# Patient Record
Sex: MALE | Race: WHITE | Employment: OTHER | URBAN - METROPOLITAN AREA
[De-identification: names, ages, dates, MRNs, and addresses within clinical notes are randomized per-mention and may not be internally consistent; named-entity substitution may affect disease eponyms.]

---

## 2019-04-30 ENCOUNTER — APPOINTMENT (OUTPATIENT)
Dept: CARDIOLOGY | Facility: CLINIC | Age: 54
End: 2019-04-30
Attending: EMERGENCY MEDICINE
Payer: COMMERCIAL

## 2019-04-30 ENCOUNTER — HOSPITAL ENCOUNTER (INPATIENT)
Facility: CLINIC | Age: 54
LOS: 3 days | Discharge: HOME | End: 2019-05-03
Attending: EMERGENCY MEDICINE | Admitting: STUDENT IN AN ORGANIZED HEALTH CARE EDUCATION/TRAINING PROGRAM
Payer: COMMERCIAL

## 2019-04-30 ENCOUNTER — HOSPITAL ENCOUNTER (OUTPATIENT)
Facility: CLINIC | Age: 54
End: 2019-04-30

## 2019-04-30 DIAGNOSIS — I20.0 UNSTABLE ANGINA PECTORIS (H): ICD-10-CM

## 2019-04-30 DIAGNOSIS — E66.01 MORBID OBESITY (H): ICD-10-CM

## 2019-04-30 DIAGNOSIS — I21.4 NSTEMI (NON-ST ELEVATED MYOCARDIAL INFARCTION) (H): ICD-10-CM

## 2019-04-30 DIAGNOSIS — I10 ESSENTIAL HYPERTENSION: Primary | ICD-10-CM

## 2019-04-30 DIAGNOSIS — I25.110 CORONARY ARTERY DISEASE INVOLVING NATIVE CORONARY ARTERY OF NATIVE HEART WITH UNSTABLE ANGINA PECTORIS (H): ICD-10-CM

## 2019-04-30 DIAGNOSIS — I24.9 ACS (ACUTE CORONARY SYNDROME) (H): ICD-10-CM

## 2019-04-30 DIAGNOSIS — I25.5 ISCHEMIC CARDIOMYOPATHY: ICD-10-CM

## 2019-04-30 DIAGNOSIS — I89.0 LYMPHEDEMA: ICD-10-CM

## 2019-04-30 LAB
ANION GAP SERPL CALCULATED.3IONS-SCNC: 8 MMOL/L (ref 3–14)
BASOPHILS # BLD AUTO: 0 10E9/L (ref 0–0.2)
BASOPHILS NFR BLD AUTO: 0.3 %
BUN SERPL-MCNC: 14 MG/DL (ref 7–30)
CALCIUM SERPL-MCNC: 9.2 MG/DL (ref 8.5–10.1)
CHLORIDE SERPL-SCNC: 105 MMOL/L (ref 94–109)
CO2 SERPL-SCNC: 27 MMOL/L (ref 20–32)
CREAT SERPL-MCNC: 0.76 MG/DL (ref 0.66–1.25)
D DIMER PPP FEU-MCNC: 0.8 UG/ML FEU (ref 0–0.5)
DIFFERENTIAL METHOD BLD: ABNORMAL
EOSINOPHIL # BLD AUTO: 0.1 10E9/L (ref 0–0.7)
EOSINOPHIL NFR BLD AUTO: 0.6 %
ERYTHROCYTE [DISTWIDTH] IN BLOOD BY AUTOMATED COUNT: 13.6 % (ref 10–15)
GFR SERPL CREATININE-BSD FRML MDRD: >90 ML/MIN/{1.73_M2}
GLUCOSE SERPL-MCNC: 112 MG/DL (ref 70–99)
HCT VFR BLD AUTO: 39.9 % (ref 40–53)
HGB BLD-MCNC: 13 G/DL (ref 13.3–17.7)
IMM GRANULOCYTES # BLD: 0 10E9/L (ref 0–0.4)
IMM GRANULOCYTES NFR BLD: 0.2 %
INTERPRETATION ECG - MUSE: NORMAL
INTERPRETATION ECG - MUSE: NORMAL
LYMPHOCYTES # BLD AUTO: 1.7 10E9/L (ref 0.8–5.3)
LYMPHOCYTES NFR BLD AUTO: 19 %
MCH RBC QN AUTO: 29.7 PG (ref 26.5–33)
MCHC RBC AUTO-ENTMCNC: 32.6 G/DL (ref 31.5–36.5)
MCV RBC AUTO: 91 FL (ref 78–100)
MONOCYTES # BLD AUTO: 1 10E9/L (ref 0–1.3)
MONOCYTES NFR BLD AUTO: 10.7 %
NEUTROPHILS # BLD AUTO: 6.1 10E9/L (ref 1.6–8.3)
NEUTROPHILS NFR BLD AUTO: 69.2 %
NRBC # BLD AUTO: 0 10*3/UL
NRBC BLD AUTO-RTO: 0 /100
NT-PROBNP SERPL-MCNC: 2872 PG/ML (ref 0–900)
PLATELET # BLD AUTO: 199 10E9/L (ref 150–450)
POTASSIUM SERPL-SCNC: 3.6 MMOL/L (ref 3.4–5.3)
RBC # BLD AUTO: 4.38 10E12/L (ref 4.4–5.9)
SODIUM SERPL-SCNC: 140 MMOL/L (ref 133–144)
TROPONIN I SERPL-MCNC: 16.56 UG/L (ref 0–0.04)
TSH SERPL DL<=0.005 MIU/L-ACNC: 1.05 MU/L (ref 0.4–4)
WBC # BLD AUTO: 8.8 10E9/L (ref 4–11)

## 2019-04-30 PROCEDURE — 25000128 H RX IP 250 OP 636: Performed by: INTERNAL MEDICINE

## 2019-04-30 PROCEDURE — 27210794 ZZH OR GENERAL SUPPLY STERILE: Performed by: INTERNAL MEDICINE

## 2019-04-30 PROCEDURE — 93005 ELECTROCARDIOGRAM TRACING: CPT | Mod: 76

## 2019-04-30 PROCEDURE — 93454 CORONARY ARTERY ANGIO S&I: CPT | Performed by: INTERNAL MEDICINE

## 2019-04-30 PROCEDURE — 93010 ELECTROCARDIOGRAM REPORT: CPT | Performed by: INTERNAL MEDICINE

## 2019-04-30 PROCEDURE — 93458 L HRT ARTERY/VENTRICLE ANGIO: CPT | Performed by: INTERNAL MEDICINE

## 2019-04-30 PROCEDURE — 85379 FIBRIN DEGRADATION QUANT: CPT | Performed by: EMERGENCY MEDICINE

## 2019-04-30 PROCEDURE — 25500064 ZZH RX 255 OP 636: Performed by: EMERGENCY MEDICINE

## 2019-04-30 PROCEDURE — 4A023N7 MEASUREMENT OF CARDIAC SAMPLING AND PRESSURE, LEFT HEART, PERCUTANEOUS APPROACH: ICD-10-PCS | Performed by: INTERNAL MEDICINE

## 2019-04-30 PROCEDURE — 93325 DOPPLER ECHO COLOR FLOW MAPG: CPT | Mod: 26 | Performed by: INTERNAL MEDICINE

## 2019-04-30 PROCEDURE — 99152 MOD SED SAME PHYS/QHP 5/>YRS: CPT | Performed by: INTERNAL MEDICINE

## 2019-04-30 PROCEDURE — 93321 DOPPLER ECHO F-UP/LMTD STD: CPT | Mod: 26 | Performed by: INTERNAL MEDICINE

## 2019-04-30 PROCEDURE — 99223 1ST HOSP IP/OBS HIGH 75: CPT | Mod: AI | Performed by: STUDENT IN AN ORGANIZED HEALTH CARE EDUCATION/TRAINING PROGRAM

## 2019-04-30 PROCEDURE — 84443 ASSAY THYROID STIM HORMONE: CPT | Performed by: EMERGENCY MEDICINE

## 2019-04-30 PROCEDURE — 85025 COMPLETE CBC W/AUTO DIFF WBC: CPT | Performed by: EMERGENCY MEDICINE

## 2019-04-30 PROCEDURE — 96375 TX/PRO/DX INJ NEW DRUG ADDON: CPT

## 2019-04-30 PROCEDURE — C1769 GUIDE WIRE: HCPCS | Performed by: INTERNAL MEDICINE

## 2019-04-30 PROCEDURE — 027034Z DILATION OF CORONARY ARTERY, ONE ARTERY WITH DRUG-ELUTING INTRALUMINAL DEVICE, PERCUTANEOUS APPROACH: ICD-10-PCS | Performed by: INTERNAL MEDICINE

## 2019-04-30 PROCEDURE — 96365 THER/PROPH/DIAG IV INF INIT: CPT

## 2019-04-30 PROCEDURE — 84484 ASSAY OF TROPONIN QUANT: CPT | Performed by: EMERGENCY MEDICINE

## 2019-04-30 PROCEDURE — C1874 STENT, COATED/COV W/DEL SYS: HCPCS | Performed by: INTERNAL MEDICINE

## 2019-04-30 PROCEDURE — 40000264 ECHOCARDIOGRAM LIMITED

## 2019-04-30 PROCEDURE — B2111ZZ FLUOROSCOPY OF MULTIPLE CORONARY ARTERIES USING LOW OSMOLAR CONTRAST: ICD-10-PCS | Performed by: INTERNAL MEDICINE

## 2019-04-30 PROCEDURE — 25000128 H RX IP 250 OP 636: Performed by: EMERGENCY MEDICINE

## 2019-04-30 PROCEDURE — C1725 CATH, TRANSLUMIN NON-LASER: HCPCS | Performed by: INTERNAL MEDICINE

## 2019-04-30 PROCEDURE — 83880 ASSAY OF NATRIURETIC PEPTIDE: CPT | Performed by: EMERGENCY MEDICINE

## 2019-04-30 PROCEDURE — 25000125 ZZHC RX 250: Performed by: EMERGENCY MEDICINE

## 2019-04-30 PROCEDURE — 99291 CRITICAL CARE FIRST HOUR: CPT | Mod: 25 | Performed by: INTERNAL MEDICINE

## 2019-04-30 PROCEDURE — 93005 ELECTROCARDIOGRAM TRACING: CPT

## 2019-04-30 PROCEDURE — 93308 TTE F-UP OR LMTD: CPT | Mod: 26 | Performed by: INTERNAL MEDICINE

## 2019-04-30 PROCEDURE — 25000132 ZZH RX MED GY IP 250 OP 250 PS 637: Performed by: INTERNAL MEDICINE

## 2019-04-30 PROCEDURE — 25000132 ZZH RX MED GY IP 250 OP 250 PS 637: Performed by: EMERGENCY MEDICINE

## 2019-04-30 PROCEDURE — C1887 CATHETER, GUIDING: HCPCS | Performed by: INTERNAL MEDICINE

## 2019-04-30 PROCEDURE — 96366 THER/PROPH/DIAG IV INF ADDON: CPT

## 2019-04-30 PROCEDURE — 99285 EMERGENCY DEPT VISIT HI MDM: CPT

## 2019-04-30 PROCEDURE — 25000125 ZZHC RX 250: Performed by: INTERNAL MEDICINE

## 2019-04-30 PROCEDURE — 99153 MOD SED SAME PHYS/QHP EA: CPT | Performed by: INTERNAL MEDICINE

## 2019-04-30 PROCEDURE — 96376 TX/PRO/DX INJ SAME DRUG ADON: CPT

## 2019-04-30 PROCEDURE — 21000001 ZZH R&B HEART CARE

## 2019-04-30 PROCEDURE — 85347 COAGULATION TIME ACTIVATED: CPT

## 2019-04-30 PROCEDURE — C9606 PERC D-E COR REVASC W AMI S: HCPCS | Performed by: INTERNAL MEDICINE

## 2019-04-30 PROCEDURE — 25000128 H RX IP 250 OP 636

## 2019-04-30 PROCEDURE — 80048 BASIC METABOLIC PNL TOTAL CA: CPT | Performed by: EMERGENCY MEDICINE

## 2019-04-30 PROCEDURE — C1894 INTRO/SHEATH, NON-LASER: HCPCS | Performed by: INTERNAL MEDICINE

## 2019-04-30 DEVICE — STENT SYNERGY DRUG ELUTING 3.50X20MM  H7493926020350: Type: IMPLANTABLE DEVICE | Status: FUNCTIONAL

## 2019-04-30 RX ORDER — HEPARIN SODIUM 1000 [USP'U]/ML
INJECTION, SOLUTION INTRAVENOUS; SUBCUTANEOUS
Status: DISCONTINUED | OUTPATIENT
Start: 2019-04-30 | End: 2019-04-30 | Stop reason: HOSPADM

## 2019-04-30 RX ORDER — IBUPROFEN 200 MG
800 TABLET ORAL DAILY
Status: ON HOLD | COMMUNITY
End: 2019-05-03

## 2019-04-30 RX ORDER — LORAZEPAM 0.5 MG/1
0.5 TABLET ORAL
Status: DISCONTINUED | OUTPATIENT
Start: 2019-04-30 | End: 2019-04-30 | Stop reason: HOSPADM

## 2019-04-30 RX ORDER — VERAPAMIL HYDROCHLORIDE 2.5 MG/ML
INJECTION, SOLUTION INTRAVENOUS
Status: DISCONTINUED | OUTPATIENT
Start: 2019-04-30 | End: 2019-04-30 | Stop reason: HOSPADM

## 2019-04-30 RX ORDER — FENTANYL CITRATE 50 UG/ML
25-50 INJECTION, SOLUTION INTRAMUSCULAR; INTRAVENOUS
Status: ACTIVE | OUTPATIENT
Start: 2019-04-30 | End: 2019-05-01

## 2019-04-30 RX ORDER — NITROGLYCERIN 0.4 MG/1
0.4 TABLET SUBLINGUAL EVERY 5 MIN PRN
Status: DISCONTINUED | OUTPATIENT
Start: 2019-04-30 | End: 2019-05-01

## 2019-04-30 RX ORDER — NALOXONE HYDROCHLORIDE 0.4 MG/ML
.1-.4 INJECTION, SOLUTION INTRAMUSCULAR; INTRAVENOUS; SUBCUTANEOUS
Status: DISCONTINUED | OUTPATIENT
Start: 2019-04-30 | End: 2019-04-30

## 2019-04-30 RX ORDER — HYDROCODONE BITARTRATE AND ACETAMINOPHEN 5; 325 MG/1; MG/1
1-2 TABLET ORAL EVERY 4 HOURS PRN
Status: DISCONTINUED | OUTPATIENT
Start: 2019-04-30 | End: 2019-05-03 | Stop reason: HOSPADM

## 2019-04-30 RX ORDER — METOPROLOL TARTRATE 25 MG/1
25 TABLET, FILM COATED ORAL 2 TIMES DAILY
Status: DISCONTINUED | OUTPATIENT
Start: 2019-04-30 | End: 2019-05-02

## 2019-04-30 RX ORDER — NITROGLYCERIN 0.4 MG/1
0.4 TABLET SUBLINGUAL EVERY 5 MIN PRN
Status: DISCONTINUED | OUTPATIENT
Start: 2019-04-30 | End: 2019-05-03 | Stop reason: HOSPADM

## 2019-04-30 RX ORDER — LISINOPRIL 20 MG/1
20 TABLET ORAL DAILY
Status: DISCONTINUED | OUTPATIENT
Start: 2019-05-01 | End: 2019-05-02

## 2019-04-30 RX ORDER — FLUMAZENIL 0.1 MG/ML
0.2 INJECTION, SOLUTION INTRAVENOUS
Status: ACTIVE | OUTPATIENT
Start: 2019-04-30 | End: 2019-05-01

## 2019-04-30 RX ORDER — NITROGLYCERIN 5 MG/ML
VIAL (ML) INTRAVENOUS
Status: DISCONTINUED | OUTPATIENT
Start: 2019-04-30 | End: 2019-04-30 | Stop reason: HOSPADM

## 2019-04-30 RX ORDER — ACETAMINOPHEN 325 MG/1
975 TABLET ORAL DAILY
Status: DISCONTINUED | OUTPATIENT
Start: 2019-05-01 | End: 2019-05-03 | Stop reason: HOSPADM

## 2019-04-30 RX ORDER — LORAZEPAM 2 MG/ML
0.5 INJECTION INTRAMUSCULAR
Status: DISCONTINUED | OUTPATIENT
Start: 2019-04-30 | End: 2019-04-30 | Stop reason: HOSPADM

## 2019-04-30 RX ORDER — ASPIRIN 81 MG/1
81 TABLET ORAL DAILY
Status: DISCONTINUED | OUTPATIENT
Start: 2019-05-01 | End: 2019-05-03 | Stop reason: HOSPADM

## 2019-04-30 RX ORDER — ACETAMINOPHEN 325 MG/1
975 TABLET ORAL DAILY
COMMUNITY

## 2019-04-30 RX ORDER — ACETAMINOPHEN 325 MG/1
325-650 TABLET ORAL EVERY 4 HOURS PRN
Status: DISCONTINUED | OUTPATIENT
Start: 2019-04-30 | End: 2019-05-03 | Stop reason: HOSPADM

## 2019-04-30 RX ORDER — POTASSIUM CHLORIDE 1500 MG/1
20 TABLET, EXTENDED RELEASE ORAL
Status: DISCONTINUED | OUTPATIENT
Start: 2019-04-30 | End: 2019-04-30 | Stop reason: HOSPADM

## 2019-04-30 RX ORDER — ATORVASTATIN CALCIUM 40 MG/1
40 TABLET, FILM COATED ORAL DAILY
Status: DISCONTINUED | OUTPATIENT
Start: 2019-05-01 | End: 2019-05-03 | Stop reason: HOSPADM

## 2019-04-30 RX ORDER — FENTANYL CITRATE 50 UG/ML
INJECTION, SOLUTION INTRAMUSCULAR; INTRAVENOUS
Status: DISCONTINUED | OUTPATIENT
Start: 2019-04-30 | End: 2019-04-30 | Stop reason: HOSPADM

## 2019-04-30 RX ORDER — ATROPINE SULFATE 0.1 MG/ML
0.5 INJECTION INTRAVENOUS EVERY 5 MIN PRN
Status: ACTIVE | OUTPATIENT
Start: 2019-04-30 | End: 2019-05-01

## 2019-04-30 RX ORDER — NITROGLYCERIN 20 MG/100ML
.07-.834 INJECTION INTRAVENOUS CONTINUOUS
Status: DISCONTINUED | OUTPATIENT
Start: 2019-04-30 | End: 2019-05-01

## 2019-04-30 RX ORDER — SODIUM CHLORIDE 9 MG/ML
INJECTION, SOLUTION INTRAVENOUS CONTINUOUS
Status: ACTIVE | OUTPATIENT
Start: 2019-04-30 | End: 2019-05-01

## 2019-04-30 RX ORDER — NALOXONE HYDROCHLORIDE 0.4 MG/ML
.1-.4 INJECTION, SOLUTION INTRAMUSCULAR; INTRAVENOUS; SUBCUTANEOUS
Status: DISCONTINUED | OUTPATIENT
Start: 2019-04-30 | End: 2019-05-03 | Stop reason: HOSPADM

## 2019-04-30 RX ORDER — SODIUM CHLORIDE 9 MG/ML
INJECTION, SOLUTION INTRAVENOUS CONTINUOUS
Status: DISCONTINUED | OUTPATIENT
Start: 2019-04-30 | End: 2019-04-30 | Stop reason: HOSPADM

## 2019-04-30 RX ORDER — ASPIRIN 325 MG
325 TABLET ORAL ONCE
Status: COMPLETED | OUTPATIENT
Start: 2019-04-30 | End: 2019-04-30

## 2019-04-30 RX ORDER — METOPROLOL TARTRATE 1 MG/ML
5 INJECTION, SOLUTION INTRAVENOUS
Status: DISPENSED | OUTPATIENT
Start: 2019-04-30 | End: 2019-04-30

## 2019-04-30 RX ORDER — NALOXONE HYDROCHLORIDE 0.4 MG/ML
.2-.4 INJECTION, SOLUTION INTRAMUSCULAR; INTRAVENOUS; SUBCUTANEOUS
Status: ACTIVE | OUTPATIENT
Start: 2019-04-30 | End: 2019-05-01

## 2019-04-30 RX ORDER — DILTIAZEM HYDROCHLORIDE 180 MG/1
180 CAPSULE, EXTENDED RELEASE ORAL DAILY
Status: ON HOLD | COMMUNITY
End: 2019-05-03

## 2019-04-30 RX ORDER — LIDOCAINE 40 MG/G
CREAM TOPICAL
Status: DISCONTINUED | OUTPATIENT
Start: 2019-04-30 | End: 2019-04-30 | Stop reason: HOSPADM

## 2019-04-30 RX ADMIN — Medication 6000 UNITS: at 20:26

## 2019-04-30 RX ADMIN — HEPARIN SODIUM 1400 UNITS/HR: 10000 INJECTION, SOLUTION INTRAVENOUS at 20:28

## 2019-04-30 RX ADMIN — LIDOCAINE HYDROCHLORIDE 30 ML: 20 SOLUTION ORAL; TOPICAL at 18:41

## 2019-04-30 RX ADMIN — HUMAN ALBUMIN MICROSPHERES AND PERFLUTREN 9 ML: 10; .22 INJECTION, SOLUTION INTRAVENOUS at 20:46

## 2019-04-30 RX ADMIN — NITROGLYCERIN 0.4 MG: 0.4 TABLET SUBLINGUAL at 19:23

## 2019-04-30 RX ADMIN — METOPROLOL TARTRATE 5 MG: 5 INJECTION INTRAVENOUS at 20:53

## 2019-04-30 RX ADMIN — ASPIRIN 325 MG ORAL TABLET 325 MG: 325 PILL ORAL at 18:41

## 2019-04-30 RX ADMIN — METOPROLOL TARTRATE 5 MG: 5 INJECTION INTRAVENOUS at 20:42

## 2019-04-30 RX ADMIN — NITROGLYCERIN 0.4 MG: 0.4 TABLET SUBLINGUAL at 19:15

## 2019-04-30 RX ADMIN — NITROGLYCERIN 0.07 MCG/KG/MIN: 20 INJECTION INTRAVENOUS at 19:51

## 2019-04-30 ASSESSMENT — ACTIVITIES OF DAILY LIVING (ADL)
BATHING: 0-->INDEPENDENT
RETIRED_EATING: 0-->INDEPENDENT
DRESS: 0-->INDEPENDENT
TOILETING: 0-->INDEPENDENT
SWALLOWING: 0-->SWALLOWS FOODS/LIQUIDS WITHOUT DIFFICULTY
RETIRED_COMMUNICATION: 0-->UNDERSTANDS/COMMUNICATES WITHOUT DIFFICULTY

## 2019-04-30 ASSESSMENT — ENCOUNTER SYMPTOMS
ABDOMINAL PAIN: 0
SHORTNESS OF BREATH: 0
RHINORRHEA: 1
COUGH: 1
VOMITING: 0
NAUSEA: 0
BACK PAIN: 0

## 2019-04-30 ASSESSMENT — MIFFLIN-ST. JEOR: SCORE: 3224

## 2019-04-30 NOTE — Clinical Note
The first balloon was inserted into the left anterior descending and middle left anterior descending.Max pressure = 6 trena. Total duration = 23 seconds.

## 2019-04-30 NOTE — ED NOTES
Bed: ED29  Expected date: 4/30/19  Expected time: 5:47 PM  Means of arrival:   Comments:  Triage CP

## 2019-04-30 NOTE — Clinical Note
Potential access sites were evaluated for patency using ultrasound.   The left radial artery was selected. Access was obtained under with Sonosite and Fluoroscopic guidance using a micropuncture 21 guage needle with direct visualization of needle entry.

## 2019-04-30 NOTE — Clinical Note
Stent deployed in the middle left anterior descending. Max pressure = 12 trena. Total duration = 22 seconds.

## 2019-04-30 NOTE — ED PROVIDER NOTES
History     Chief Complaint:  Chest pain     HPI   Sarbjit Tran is a 53 year old male with a history of hypertension who presents with chest pain. The patient noticed chest pain 4-5 days ago while in [place]. The pain has been relatively constant and has been worsened by food intake. He has noticed a cough and rhinorrhea but attributed this to allergies. He states his pain was at a 10/10 after breafast today and he noticed his heart was racing. He drove back to [place] today and, due to pain, the patient has visited the ED for evaluation and treatment with his wife. Upon arrival, the patient states his pain is at a 2/10 here in the ED. The patient denies abdominal pain, nausea, vomiting, shortness of breath (more than usual), no increased swelling in the legs, or back pain. No history of heart disease, hyperlipidemia, blood clots, diabetes mellitus. The patient states he has pre diabetes. The patient states he attempted a cardiac stress test in the past with a poor reading years ago. The patient gets his care at [facility] and [facility]. The patient states he took 4 ibuprofen around 1030 today.     Allergies:  The patient has no known drug allergies.    Medications:    Aspirin   Tiazac   Hydrocodone-acetaminophen   Lisinopril   Niaspan   Testosterone     Past Medical History:    Abscess   Hypertension   Obesity   Sleep apnea    Past Surgical History:    Excise lesion trunk   Orthopedic surgery    Family History:    No past pertinent family history.    Social History:  Marital Status:     Smoker:   Never  Alcohol:   Yes   Drugs:   No   Arrives with:   Wife     Review of Systems   HENT: Positive for rhinorrhea.    Respiratory: Positive for cough. Negative for shortness of breath.    Cardiovascular: Positive for chest pain. Negative for leg swelling.   Gastrointestinal: Negative for abdominal pain, nausea and vomiting.   Musculoskeletal: Negative for back pain.   All other systems reviewed and are  "negative.    Physical Exam     Patient Vitals for the past 24 hrs:   BP Temp Temp src Pulse Heart Rate Resp SpO2 Height Weight   05/01/19 0030 142/82 -- -- 89 90 14 -- -- --   05/01/19 0015 162/81 -- -- 88 91 21 -- -- --   05/01/19 0000 (!) 150/93 -- -- 91 86 24 -- -- --   04/30/19 2345 141/79 -- -- 87 85 14 -- -- --   04/30/19 2330 150/79 -- -- 85 86 23 95 % -- --   04/30/19 2315 142/84 -- -- -- -- -- -- -- --   04/30/19 2105 115/72 -- -- 88 89 10 94 % -- --   04/30/19 2030 130/80 -- -- 107 103 19 93 % -- --   04/30/19 2015 (!) 147/98 -- -- -- 98 11 92 % -- --   04/30/19 2000 152/86 -- -- 97 98 12 93 % -- --   04/30/19 1927 -- -- -- -- -- -- -- 1.753 m (5' 9\") (!) 239.9 kg (528 lb 12.8 oz)   04/30/19 1800 133/71 -- -- 100 101 20 -- -- --   04/30/19 1741 168/84 97.7  F (36.5  C) Temporal 100 -- 22 97 % -- --     Physical Exam  General: Alert, appears well-developed and well-nourished. Morbidly obese. Cooperative.     In mild distress  HEENT:  Head:  Atraumatic  Ears:  External ears are normal  Mouth/Throat:  Oropharynx is without erythema or exudate and mucous membranes are moist.   Eyes:   Conjunctivae normal and EOM are normal. No scleral icterus.    Pupils are equal, round, and reactive to light.   CV:  Normal rate, regular rhythm, normal heart sounds and radial pulses are 2+ and symmetric.  No murmur.  Resp:  Breath sounds are clear bilaterally    Non-labored, no retractions or accessory muscle use  GI:  Abdomen is soft, no distension, no tenderness. No rebound or guarding.  No CVA tenderness bilaterally  MS:  Normal range of motion. 1+ pitting edema.    Normal strength in all 4 extremities.     Back atraumatic.    No midline cervical, thoracic, or lumbar tenderness  Skin:  Warm and dry.  No rash or lesions noted.  Neuro: Alert. Normal strength.  GCS: 15  Psych:  Normal mood and affect.    Emergency Department Course   ECG:  Indication: chest pain   Time: 1743 -First ECG-  Vent. Rate 91 bpm. PA interval 132. QRS " duration 100. QT/QTc 350/430. P-R-T axis 59 -4 76. Normal sinus rhythm. Septal infarct, age undetermined. Abnormal ECG. Read time: 1810    Indication: chest pain   Time: 1802 -Secong ECG-  Vent. Rate 95 bpm. HI interval 136. QRS duration 94. QT/QTc 350/439. P-R-T axis 65 -21 44. Normal sinus rhythm. Septal infarct, age undetermined. Abnormal ECG. Read time: 1815    Indication: chest pain   Time: 1910 -Third ECG-  Vent. Rate 80 bpm. HI interval 134. QRS duration 98. QT/QTc 362/417. P-R-T axis 51 -24 19. Normal sinus rhythm. Septal infarct, age undetermined. Abnormal ECG. Read time: 1913    Laboratory:  1823 Troponin: 16.557  Nt Probnp inpatient (BNP): 2,872  CBC: WBC: 8.8, HGB: 13.0, PLT: 199  D-Dimer: 0.8  TSH: 1.05  BMP: Glucose glucose 112, o/w WNL (Creatinine: 0.76)    Interventions:  1841: Aspirin 325 mg PO  1841: GI Cocktail - Maalox 15 mL, Viscous Lidocaine 15 mL, 30 mL suspension PO   1915: Nitroglycerin 0.4 mg sublingual    1923: Nitroglycerin 0.4 mg sublingual    1951: Nitro 50 mg in D5W     Emergency Department Course:  (1759) I performed an exam of the patient as documented above.   (1912) I rechecked the patient and discussed the results of their workup thus far.   (1917) I consulted with Dr. Pedraza, Cardiology, regarding the patient's history and presentation here in the emergency department.  (1945) I consulted with Dr. Pedraza, Cardiology, regarding the patient's history and presentation here in the emergency department.  (1950)  I consulted with Dr. Hope of the hospitalist services. They are in agreement to accept the patient for admission.  (1953)  I rechecked the patient.      Findings and plan explained to the Patient and spouse who consents to admission. Discussed the patient with Dr. Hope, who will admit the patient to a CCU bed for further monitoring, evaluation, and treatment.    Impression & Plan      Medical Decision Making:  The patient is a 53-year-old male with a history of hypertension  who presents with 5 to 7 days of intermittent substernal chest discomfort.  Patient's initial EKG concerning for Q waves predominantly in the anterior leads with some mild ST changes with no reciprocal depression changes.  Patient did have symptoms that seemed much more consistent with potential GERD or gastritis presentation particularly due to the length of symptoms and worsening with food intake.  Patient did have improved symptoms with GI cocktail although ultimately patient's lab work was concerning with a resultant troponin of 16.557.  Concern patient's pain is consistent with ACS and ultimately patient was given an aspirin prior to resulting of lab work.  We did ultimately start the patient on heparin at this time and did start a nitro drip to see if we could help better manage the patient's pain.  We were able to get the patient's pain from a 6/10 to a 2/10 after increasing the nitroglycerin drip.  Patient's vital signs have remained stable here and multiple EKGs were obtained during the course here in the emergency department with no evolving changes.  I did speak with Dr. Pedraza of cardiology who evaluated the patient here in the emergency department.  Due to inability to resolve chest pain symptoms after maxing out nitroglycerin drip, we will plan to activate the Cath Lab.  Patient is morbidly obese and ultimately we did have to ensure that the Cath Lab was able to accept the patient due to his large size.  Cath Lab was able to accept the patient ultimately and I also spoke with the hospitalist Dr. Hope who agreed to CCU admission following Cath Lab intervention.  Patient did have an elevated d-dimer of 0.8 although due to need for Cath Lab intervention as well as already starting a heparin drip for suspected ACS lower concern for pulmonary embolism at this time.  Patient sent to Cath Lab.    Critical Care time:  was 75 minutes for this patient excluding procedures.    Diagnosis:    ICD-10-CM    1. NSTEMI  (non-ST elevated myocardial infarction) (H) I21.4 CARDIAC REHAB REFERRAL     Lipid Profile     Troponin I     CANCELED: Troponin I     CANCELED: Basic metabolic panel     CANCELED: CBC with platelets     CANCELED: Troponin I   2. Unstable angina pectoris (H) I20.0    3. ACS (acute coronary syndrome) (H) I24.9      Disposition:  Admitted to CCU    Scribe Disclosure:  Duncan CORBIN, am serving as a scribe on 4/30/2019 at 5:59 PM to personally document services performed by Yehuda Harvey MD based on my observations and the provider's statements to me.     Duncan Garcia  4/30/2019    EMERGENCY DEPARTMENT       Yehuda Harvey MD  05/01/19 0037

## 2019-04-30 NOTE — Clinical Note
The first balloon was inserted into the left anterior descending and middle left anterior descending.Max pressure = 14 trena. Total duration = 18 seconds.     Max pressure = 14 trena. Total duration = 20 seconds.    Balloon reinflated a second time: Max pressure = 14 trena. Total duration = 20 seconds.

## 2019-05-01 ENCOUNTER — APPOINTMENT (OUTPATIENT)
Dept: OCCUPATIONAL THERAPY | Facility: CLINIC | Age: 54
End: 2019-05-01
Payer: COMMERCIAL

## 2019-05-01 LAB
ANION GAP SERPL CALCULATED.3IONS-SCNC: 8 MMOL/L (ref 3–14)
BUN SERPL-MCNC: 13 MG/DL (ref 7–30)
CALCIUM SERPL-MCNC: 8.4 MG/DL (ref 8.5–10.1)
CHLORIDE SERPL-SCNC: 106 MMOL/L (ref 94–109)
CHOLEST SERPL-MCNC: 161 MG/DL
CHOLEST SERPL-MCNC: 163 MG/DL
CO2 SERPL-SCNC: 26 MMOL/L (ref 20–32)
CREAT SERPL-MCNC: 0.73 MG/DL (ref 0.66–1.25)
ERYTHROCYTE [DISTWIDTH] IN BLOOD BY AUTOMATED COUNT: 13.9 % (ref 10–15)
GFR SERPL CREATININE-BSD FRML MDRD: >90 ML/MIN/{1.73_M2}
GLUCOSE SERPL-MCNC: 124 MG/DL (ref 70–99)
HBA1C MFR BLD: 5.9 % (ref 0–5.6)
HCT VFR BLD AUTO: 37.2 % (ref 40–53)
HDLC SERPL-MCNC: 36 MG/DL
HDLC SERPL-MCNC: 40 MG/DL
HGB BLD-MCNC: 12.1 G/DL (ref 13.3–17.7)
INTERPRETATION ECG - MUSE: NORMAL
KCT BLD-ACNC: 241 SEC (ref 75–150)
LDLC SERPL CALC-MCNC: 104 MG/DL
LDLC SERPL CALC-MCNC: 91 MG/DL
MCH RBC QN AUTO: 29.8 PG (ref 26.5–33)
MCHC RBC AUTO-ENTMCNC: 32.5 G/DL (ref 31.5–36.5)
MCV RBC AUTO: 92 FL (ref 78–100)
NONHDLC SERPL-MCNC: 121 MG/DL
NONHDLC SERPL-MCNC: 127 MG/DL
PLATELET # BLD AUTO: 165 10E9/L (ref 150–450)
POTASSIUM SERPL-SCNC: 3.8 MMOL/L (ref 3.4–5.3)
RBC # BLD AUTO: 4.06 10E12/L (ref 4.4–5.9)
SODIUM SERPL-SCNC: 140 MMOL/L (ref 133–144)
TRIGL SERPL-MCNC: 178 MG/DL
TRIGL SERPL-MCNC: 85 MG/DL
TROPONIN I SERPL-MCNC: 102.33 UG/L (ref 0–0.04)
TROPONIN I SERPL-MCNC: 24.36 UG/L (ref 0–0.04)
TROPONIN I SERPL-MCNC: 30.12 UG/L (ref 0–0.04)
TROPONIN I SERPL-MCNC: 59.15 UG/L (ref 0–0.04)
WBC # BLD AUTO: 8.5 10E9/L (ref 4–11)

## 2019-05-01 PROCEDURE — 25800030 ZZH RX IP 258 OP 636

## 2019-05-01 PROCEDURE — 21000001 ZZH R&B HEART CARE

## 2019-05-01 PROCEDURE — 99232 SBSQ HOSP IP/OBS MODERATE 35: CPT | Performed by: INTERNAL MEDICINE

## 2019-05-01 PROCEDURE — 25000132 ZZH RX MED GY IP 250 OP 250 PS 637

## 2019-05-01 PROCEDURE — 85027 COMPLETE CBC AUTOMATED: CPT | Performed by: STUDENT IN AN ORGANIZED HEALTH CARE EDUCATION/TRAINING PROGRAM

## 2019-05-01 PROCEDURE — 83036 HEMOGLOBIN GLYCOSYLATED A1C: CPT | Performed by: STUDENT IN AN ORGANIZED HEALTH CARE EDUCATION/TRAINING PROGRAM

## 2019-05-01 PROCEDURE — 25000132 ZZH RX MED GY IP 250 OP 250 PS 637: Performed by: STUDENT IN AN ORGANIZED HEALTH CARE EDUCATION/TRAINING PROGRAM

## 2019-05-01 PROCEDURE — 25000132 ZZH RX MED GY IP 250 OP 250 PS 637: Performed by: INTERNAL MEDICINE

## 2019-05-01 PROCEDURE — 25000128 H RX IP 250 OP 636: Performed by: INTERNAL MEDICINE

## 2019-05-01 PROCEDURE — 99233 SBSQ HOSP IP/OBS HIGH 50: CPT | Performed by: INTERNAL MEDICINE

## 2019-05-01 PROCEDURE — 97165 OT EVAL LOW COMPLEX 30 MIN: CPT | Mod: GO

## 2019-05-01 PROCEDURE — 80061 LIPID PANEL: CPT

## 2019-05-01 PROCEDURE — 84484 ASSAY OF TROPONIN QUANT: CPT

## 2019-05-01 PROCEDURE — 97110 THERAPEUTIC EXERCISES: CPT | Mod: GO

## 2019-05-01 PROCEDURE — 84484 ASSAY OF TROPONIN QUANT: CPT | Performed by: STUDENT IN AN ORGANIZED HEALTH CARE EDUCATION/TRAINING PROGRAM

## 2019-05-01 PROCEDURE — 36415 COLL VENOUS BLD VENIPUNCTURE: CPT

## 2019-05-01 PROCEDURE — 80061 LIPID PANEL: CPT | Performed by: STUDENT IN AN ORGANIZED HEALTH CARE EDUCATION/TRAINING PROGRAM

## 2019-05-01 PROCEDURE — 36415 COLL VENOUS BLD VENIPUNCTURE: CPT | Performed by: STUDENT IN AN ORGANIZED HEALTH CARE EDUCATION/TRAINING PROGRAM

## 2019-05-01 PROCEDURE — 80048 BASIC METABOLIC PNL TOTAL CA: CPT | Performed by: STUDENT IN AN ORGANIZED HEALTH CARE EDUCATION/TRAINING PROGRAM

## 2019-05-01 RX ORDER — FUROSEMIDE 40 MG
40 TABLET ORAL
Status: DISCONTINUED | OUTPATIENT
Start: 2019-05-02 | End: 2019-05-03

## 2019-05-01 RX ORDER — MORPHINE SULFATE 2 MG/ML
2 INJECTION, SOLUTION INTRAMUSCULAR; INTRAVENOUS
Status: DISCONTINUED | OUTPATIENT
Start: 2019-05-01 | End: 2019-05-03 | Stop reason: HOSPADM

## 2019-05-01 RX ORDER — SPIRONOLACTONE 25 MG
12.5 TABLET ORAL DAILY
Status: DISCONTINUED | OUTPATIENT
Start: 2019-05-01 | End: 2019-05-02

## 2019-05-01 RX ORDER — FUROSEMIDE 10 MG/ML
40 INJECTION INTRAMUSCULAR; INTRAVENOUS ONCE
Status: COMPLETED | OUTPATIENT
Start: 2019-05-01 | End: 2019-05-01

## 2019-05-01 RX ADMIN — METOPROLOL TARTRATE 25 MG: 25 TABLET ORAL at 20:57

## 2019-05-01 RX ADMIN — TICAGRELOR 90 MG: 90 TABLET ORAL at 08:37

## 2019-05-01 RX ADMIN — ATORVASTATIN CALCIUM 40 MG: 40 TABLET, FILM COATED ORAL at 08:37

## 2019-05-01 RX ADMIN — ASPIRIN 81 MG: 81 TABLET, COATED ORAL at 08:37

## 2019-05-01 RX ADMIN — TICAGRELOR 90 MG: 90 TABLET ORAL at 00:30

## 2019-05-01 RX ADMIN — METOPROLOL TARTRATE 25 MG: 25 TABLET ORAL at 08:37

## 2019-05-01 RX ADMIN — METOPROLOL TARTRATE 25 MG: 25 TABLET ORAL at 00:30

## 2019-05-01 RX ADMIN — ACETAMINOPHEN 975 MG: 325 TABLET, FILM COATED ORAL at 08:37

## 2019-05-01 RX ADMIN — LISINOPRIL 20 MG: 20 TABLET ORAL at 08:37

## 2019-05-01 RX ADMIN — TICAGRELOR 90 MG: 90 TABLET ORAL at 20:57

## 2019-05-01 RX ADMIN — MORPHINE SULFATE 2 MG: 2 INJECTION, SOLUTION INTRAMUSCULAR; INTRAVENOUS at 00:34

## 2019-05-01 RX ADMIN — SODIUM CHLORIDE: 9 INJECTION, SOLUTION INTRAVENOUS at 00:34

## 2019-05-01 RX ADMIN — SPIRONOLACTONE 12.5 MG: 25 TABLET ORAL at 18:46

## 2019-05-01 RX ADMIN — FUROSEMIDE 40 MG: 10 INJECTION, SOLUTION INTRAVENOUS at 18:46

## 2019-05-01 ASSESSMENT — MIFFLIN-ST. JEOR: SCORE: 3224.91

## 2019-05-01 NOTE — PHARMACY-AMINOGLYCOSIDE DOSING SERVICE
Admission Medication History    Admission medication history interview status for the 4/30/2019 admission is complete. See EPIC admission navigator for prior to admission medications     Medication history source reliability:Good    Actions taken by pharmacist (provider contacted, etc):None     Additional medication history information not noted on PTA med list :None    Medication reconciliation/reorder completed by provider prior to medication history? No    Time spent in this activity: 15 minutes    Prior to Admission medications    Medication Sig Last Dose Taking? Auth Provider   acetaminophen (TYLENOL) 325 MG tablet Take 975 mg by mouth daily with Ibuprofen 4/30/2019 at Unknown time Yes Unknown, Entered By History   calcium carbonate 600 mg-vitamin D 400 units (CALTRATE) 600-400 MG-UNIT per tablet Take 1 tablet by mouth daily 4/30/2019 at Unknown time Yes Unknown, Entered By History   diltiazem ER (DILT-XR) 180 MG 24 hr capsule Take 180 mg by mouth daily 4/30/2019 at AM Yes Unknown, Entered By History   ibuprofen (ADVIL/MOTRIN) 200 MG tablet Take 800 mg by mouth daily with Acetaminophen 4/30/2019 at Unknown time Yes Unknown, Entered By History   lisinopril (PRINIVIL,ZESTRIL) 40 MG tablet Take 40 mg by mouth daily. 4/30/2019 at AM Yes Reported, Patient   NONFORMULARY Take 1 tablet by mouth 2 times daily -  CIRCULEG - combination nutritional supplement to improve circulation 4/30/2019 at Unknown time Yes Unknown, Entered By History   vitamin D3 (CHOLECALCIFEROL) 1000 units (25 mcg) tablet Take 2,000 Units by mouth daily 4/30/2019 at Unknown time Yes Unknown, Entered By History       Karl Bertrand, PharmD

## 2019-05-01 NOTE — PROGRESS NOTES
[facility]  Cardiology Progress Note  Date of Service: 05/01/2019    Assessment & Plan    Sarbjit Tran is a 53 year old male with past medical history significant for morbid obesity, hypertension, lymphedema and prediabetes admitted on 4/30/2019 with 5 days of persistent central chest discomfort that escalated to 10/10 and prompted his presentation to the ED.     Assessment:   1. NSTEMI:    Troponin 16.5-102.3-59.149    Coronary angiogram revealed culprit lesion of 99% proximal LAD stenosis (high risk and located at the bifurcation and thrombotic).     Status post JEREL to proximal LAD    Minimal residual CAD in LCx and RCA    DAPT with aspirin and Brilinta    Preserved renal function post angiogram    , HDL 36, LDL 91, trigs 178-Atorvastatin 40 mg daily    2. Ischemic cardiomyopathy:    BNP 2872    Distal anterior and apical akinesis and LVEF 40-45%    Metoprolol 25 mg BID, lisinopril 20 mg daily    3. Hypertension: [PTA: lisinopril 40 mg daily and diltiazem 180 mg daily]    Metoprolol 25 mg BID and lisinopril 20 mg daily    Plan:  1. Cardiac rehab  2. Discharge in 1-2 days     RITA PEREZ CNP  Pager:  [phone]   (7am - 5pm, M-F)    Interval History     Physical Exam   Temp: 98.8  F (37.1  C) Temp src: Oral BP: 132/76 Pulse: 87 Heart Rate: 97 Resp: 18 SpO2: 97 % O2 Device: BiPAP/CPAP Oxygen Delivery: 15 LPM  Vitals:    04/30/19 1927 05/01/19 0315   Weight: (!) 239.9 kg (528 lb 12.8 oz) (!) 240 kg (529 lb)       Constitutional:   NAD   Skin:   Warm and dry   Head:   Nontraumatic   Neck:   Unable to assess   Lungs:   symmetric, clear to auscultation   Cardiovascular:   regular rate and rhythm   Abdomen:   Obese   Extremities and Back:   RRA 2+ peripheral pulses and CDI. Chronic lymphedema   Neurological:   Grossly nonfocal       Medications     - MEDICATION INSTRUCTIONS -       Percutaneous Coronary Intervention orders placed (this is information for BPA alerting)          acetaminophen  975 mg Oral Daily     aspirin  81 mg Oral Daily     atorvastatin  40 mg Oral Daily     lisinopril  20 mg Oral Daily     metoprolol tartrate  25 mg Oral BID     ticagrelor  90 mg Oral BID       Data     Most Recent 3 CBC's:  Recent Labs   Lab Test 05/01/19  0550 04/30/19  1823   WBC 8.5 8.8   HGB 12.1* 13.0*   MCV 92 91    199     Most Recent 3 BMP's:  Recent Labs   Lab Test 05/01/19  0550 04/30/19  1823    140   POTASSIUM 3.8 3.6   CHLORIDE 106 105   CO2 26 27   BUN 13 14   CR 0.73 0.76   ANIONGAP 8 8   DENG 8.4* 9.2   * 112*     Most Recent 3 Troponin's:  Recent Labs   Lab Test 05/01/19  0550 05/01/19  0014 04/30/19  1823   TROPI 59.149* 102.332* 16.557*     Most Recent Cholesterol Panel:  Recent Labs   Lab Test 05/01/19  0550   CHOL 163   LDL 91   HDL 36*   TRIG 178*

## 2019-05-01 NOTE — PLAN OF CARE
OT/CR: PM session-resting /94. Nurse ok's activity with BP <180. Ambulated ~50 ft, with , /123. Session ended. Discussed with nurse.

## 2019-05-01 NOTE — CONSULTS
"[facility]    Cardiology Consultation     Date of Admission:  4/30/2019    Assessment & Plan   Sarbjit Tran is a 53 year old male who was admitted on 4/30/2019.    1.  Acute versus subacute anterior wall myocardial infarction (non-STEMI).  Patient has ongoing symptoms of angina despite maximal medical therapy including IV beta-blocker and IV nitroglycerin.  Symptoms began 5 days ago and have varied in intensity but never completely resolved.  Troponin level is 16 upon arrival to the emergency room with EKG showing Q waves in leads V1 and V2 and minor nonspecific anterior ST and T wave abnormalities.    2.  Ischemic cardiomyopathy with stat echocardiogram in the emergency room demonstrating distal anterior and apical akinesis with mild to moderate left ventricular systolic dysfunction.  Ejection fraction is 40 to 45%.    3.  Morbid obesity    4.  Hypertension on medical therapy with diltiazem and lisinopril    5.  \"Prediabetes\"    6. Lymphedema    Recommendations:    After attempting to resolve the patient's chest discomfort in the emergency room with sublingual nitroglycerin, IV metoprolol, IV nitroglycerin, heparin, and oxygen, the patient's chest discomfort decreased from 6/10 in severity to 2/10 in severity but the patient has not become pain-free.  Based on evidence of regional wall motion abnormalities and elevated troponin, I suspect the patient has ongoing myocardial injury and have recommended coronary angiography with possible intervention.      Risks and benefits of left heart catheterization and coronary angiogram were discussed with the patient in detail. Risk estimated at 0.1-0.3% for diagnostic angio and 1-2% for PCI, including risk of stroke, MI, death, emergent bypass, contrast induced allergic reaction, renal dysfunction, and vascular complications (including bleeding and transfusion) were discussed. Patient understands and wishes to proceed.    I discussed the case with  " Wes in the emergency room and Dr. De La Cruz, who is doing coronary intervention emergently [facility] and we are in agreement with emergent coronary angiography.  The patient weighs 530 pounds which is just under the weight limit of the table in the Cath Lab which is 540 pounds.  I discussed specifically with the patient that his large body size may increase the risk of this procedure and potentially could lead to more complications.  He understands this.    NICK NOVOA MD      50 minutes critical care time.     Primary Care Physician   Jatinder Suazo    Reason for Consult   Reason for consult: I was asked by Dr. Harvey in the emergency room to evaluate this patient for chest pain.    History of Present Illness   Sarbjit Tran is a 53 year old male who presents with 5 days of persistent central retrosternal burning chest discomfort.  At one point he had some radiation of the discomfort to his neck but generally it has stayed within the central portion of the chest.  When it began 5 days ago, the pain was fairly mild and he rated it 2-4 over 10 in severity.  It has not gone away during this period but has increased and decreased in intensity without clear cause.  He denies having shortness of breath symptoms and had no PND orthopnea.  He did have some lightheadedness when he got out of a car in [place] where he was vacationing but no syncope.  He felt his heart beating fast.  He had no diaphoresis nausea or vomiting.    Today as he was driving back from [place] with his wife, he felt the intensity increased to 10/10 in severity this morning.  When they got back to [place] he reported the chest discomfort to her and suggested that they stop in the emergency room for evaluation.  Upon arrival the chest discomfort was 2/10 in severity been increased to 6/10 in severity before starting medical therapy.    His initial troponin is 16.6.  His NT proBNP is 2872.  His CBC looks essentially normal.  His d-dimer is 0.8.  His  creatinine is 0.76 BUN 14.  His EKG shows sinus rhythm with poor R wave progression, Q waves in leads V1 and V2, and minor ST elevation in lead V2 only.    After maximal medical therapy, his chest discomfort persists at 2/10 in severity    He has a diagnosis of hypertension and takes lisinopril and diltiazem.  He does not know his cholesterol numbers.  He was told to take simvastatin but declined.  His doctor has told him he has prediabetes.  He has never been a smoker.  He has no family history of premature cardiac disease.    Patient Active Problem List   Diagnosis     NSTEMI (non-ST elevated myocardial infarction) (H)     Essential hypertension       Past Medical History   I have reviewed this patient's medical history and updated it with pertinent information if needed.   Past Medical History:   Diagnosis Date     Abscess      Hypertension      Obesity      Sleep apnea     NEVER BEEN DIAGNOSED       Past Surgical History   I have reviewed this patient's surgical history and updated it with pertinent information if needed.  Past Surgical History:   Procedure Laterality Date     EXCISE LESION TRUNK  5/11/2012    Procedure:EXCISE LESION TRUNK; excision back cyst; Surgeon:ELENA LEE; Location:[facility]     ORTHOPEDIC SURGERY      >15 on right hand  / 1on left       Prior to Admission Medications   Prior to Admission Medications   Prescriptions Last Dose Informant Patient Reported? Taking?   NONFORMULARY 4/30/2019 at Unknown time Self Yes Yes   Sig: Take 1 tablet by mouth 2 times daily -  CIRCULEG - combination nutritional supplement to improve circulation   acetaminophen (TYLENOL) 325 MG tablet 4/30/2019 at Unknown time Self Yes Yes   Sig: Take 975 mg by mouth daily with Ibuprofen   calcium carbonate 600 mg-vitamin D 400 units (CALTRATE) 600-400 MG-UNIT per tablet 4/30/2019 at Unknown time Self Yes Yes   Sig: Take 1 tablet by mouth daily   diltiazem ER (DILT-XR) 180 MG 24 hr capsule 4/30/2019 at AM Self Yes  "Yes   Sig: Take 180 mg by mouth daily   ibuprofen (ADVIL/MOTRIN) 200 MG tablet 4/30/2019 at Unknown time Self Yes Yes   Sig: Take 800 mg by mouth daily with Acetaminophen   lisinopril (PRINIVIL,ZESTRIL) 40 MG tablet 4/30/2019 at AM Self Yes Yes   Sig: Take 40 mg by mouth daily.   vitamin D3 (CHOLECALCIFEROL) 1000 units (25 mcg) tablet 4/30/2019 at Unknown time Self Yes Yes   Sig: Take 2,000 Units by mouth daily      Facility-Administered Medications: None     Current Facility-Administered Medications   Medication Dose Route Frequency     Current Facility-Administered Medications   Medication Last Rate     HEParin 1,400 Units/hr (04/30/19 2028)     nitroGLYcerin 0.6 mcg/kg/min (04/30/19 2102)     Allergies   No Known Allergies    Social History    reports that he has never smoked. He does not have any smokeless tobacco history on file. He reports that he drinks alcohol. He reports that he does not use drugs.    Family History   No family history on file. He denies family history of cardiac disease in first degree relatives.     Review of Systems   The comprehensive 10 point Review of Systems is negative other than noted in the HPI or here.     Physical Exam   Vital Signs with Ranges  Temp:  [97.7  F (36.5  C)] 97.7  F (36.5  C)  Pulse:  [] 88  Heart Rate:  [] 89  Resp:  [10-22] 10  BP: (115-168)/(71-98) 115/72  SpO2:  [92 %-97 %] 94 %  Wt Readings from Last 4 Encounters:   04/30/19 (!) 239.9 kg (528 lb 12.8 oz)   05/11/12 (!) 211 kg (465 lb 2.7 oz)   03/20/12 (!) 210.9 kg (465 lb)     No intake/output data recorded.      Vitals: /72   Pulse 88   Temp 97.7  F (36.5  C) (Temporal)   Resp 10   Ht 1.753 m (5' 9\")   Wt (!) 239.9 kg (528 lb 12.8 oz)   SpO2 94%   BMI 78.09 kg/m      Constitutional: Awake, alert, cooperative, no apparent distress, and appears stated age. obesity  Eyes: Lids and lashes normal, pupils equal, sclera clear, conjunctiva normal.  ENT: Normocephalic, without obvious " abnormality, atramatic, sinuses nontender on palpation, external ears without lesions, oral pharynx with moist mucus membranes,.  Neck: Supple, symmetrical, trachea midline, no adenopathy,skin normal.  Hematologic / Lymphatic: No cervical lymphadenopathy and no supraclavicular lymphadenopathy.  Back: Symmetric, no curvature, spinous processes are non-tender on palpation, paraspinous muscles are non-tender on palpation, no costal vertebral tenderness.  Lungs: No increased work of breathing, good air exchange, clear to auscultation bilaterally, no crackles or wheezing.  Cardiovascular: Regular rate and rhythm, normal S1 and S2, no S3 or S4, and no murmur noted.  Abdomen: No scars, normal bowel sounds, soft, non-distended, non-tender, no masses palpated, no hepatosplenomegally.  Musculoskeletal: No redness, warmth, or swelling of the joints.  Full range of motion noted.  Motor strength is 5 out of 5 all extremities bilaterally.  Tone is normal. Congenital abnormality of R hand. Traumatic loss of thumb on L hand  Neurologic: Awake, alert, oriented to name, place and time    Neuropsychiatric: Normal affect, mood, orientation, memory and insight.  Skin: No rashes, erythema, pallor, petechia or purpura.    Recent Labs   Lab 04/30/19 1823   TROPI 16.557*       Recent Labs   Lab 04/30/19 1823   WBC 8.8   HGB 13.0*   MCV 91         POTASSIUM 3.6   CHLORIDE 105   CO2 27   BUN 14   CR 0.76   GFRESTIMATED >90   GFRESTBLACK >90   ANIONGAP 8   DENG 9.2   *   TROPI 16.557*     No results for input(s): CHOL, HDL, LDL, TRIG, CHOLHDLRATIO in the last 18912 hours.  Recent Labs   Lab 04/30/19 1823   WBC 8.8   HGB 13.0*   HCT 39.9*   MCV 91        No results for input(s): PH, PHV, PO2, PO2V, SAT, PCO2, PCO2V, HCO3, HCO3V in the last 168 hours.  Recent Labs   Lab 04/30/19 1823   NTBNPI 2,872*     Recent Labs   Lab 04/30/19 1823   DD 0.8*     No results for input(s): SED, CRP in the last 168 hours.  Recent  Labs   Lab 04/30/19  1823        Recent Labs   Lab 04/30/19  1823   TSH 1.05     No results for input(s): COLOR, APPEARANCE, URINEGLC, URINEBILI, URINEKETONE, SG, UBLD, URINEPH, PROTEIN, UROBILINOGEN, NITRITE, LEUKEST, RBCU, WBCU in the last 168 hours.    Imaging:  No results found for this or any previous visit (from the past 48 hour(s)).    Echo:  No results found for this or any previous visit (from the past 4320 hour(s)).

## 2019-05-01 NOTE — H&P
[facility]    History and Physical - Hospitalist Service       Date of Admission:  4/30/2019    Assessment & Plan   Sarbjit Tran is a 53 year old male admitted on 4/30/2019. He presents with CP.       NSTEMI (non-ST elevated myocardial infarction) (H)    Assessment: Patient presents with 5-day history of centralized chest pain with no radiation.  Initial work-up was pertinent for a troponin of 16.56, proBNP of 2872.  EKG shows sinus rhythm with septal Q waves.  Nitroglycerin infusion was started, but patient remains with mild chest pain.  Cardiology has evaluated patient, plan for cardiac catheterization this evening.    Plan:   -Admit to inpatient  -Continue IV nitroglycerin and heparin infusions  -Cardiology consult  -Echo is pending  -Check A1c, lipid panel in a.m.  -Start beta-blocker  -Hold PTA diltiazem  -ASA daily  -Keep NPO For cath lab tonight      Essential hypertension    Assessment: PTA patient is on lisinopril 40 mg daily, diltiazem 180 mg daily    Plan:   -Hold PTA diltiazem in setting of acute coronary syndrome  -Hold lisinopril until creatinine has been checked in a.m.       Diet: NPO  DVT Prophylaxis: Pneumatic Compression Devices  Dickey Catheter: not present  Code Status: FULL CODE    Disposition Plan   Expected discharge: 2 - 3 days, recommended to prior living arrangement once Cardiology work up completed.  Entered: Mayco Hope MD 04/30/2019, 8:13 PM     The patient's care was discussed with the Patient and ED provider.    Mayco Hope MD  [facility]    ______________________________________________________________________    Chief Complaint     Chest Pain    History is obtained from the patient    History of Present Illness   Sarbjit Tran is a 53 year old male with PMH of morbid obesity, hypertension, prediabetes who presents for evaluation of chest pain.    Patient reports that he initially developed chest pain roughly 5 days prior to admission while in  [place].  Pain was initially centralized, burning sensation worsened with food intake.  There was no radiation of his pain into his arm/neck/jaw.  Without associated with any dyspnea or diaphoresis.  He reports that since then he has had intermittent chest pain that comes and goes, but could be a severe up to a 10 out of 10.  He also endorsed feeling his heart beating faster than baseline over the last 5 days as well.  He denies any nausea/vomiting.  Denies any abdominal pain, recent bloody stool, diarrhea.  He has baseline severe lower extremity edema of his bilateral extremities, but this is at baseline for patient.  Denies any calf pain.  He reports no family history of heart disease in his family.  He took 4 ibuprofen tablets around 1030 on the day of admission.  He has no prior history of coronary artery disease.  He denies any recent fever/cough or chest wall trauma.  Denies any new rashes, he has any chronic wound on his left lower extremity that is at baseline.    Review of Systems    The 10 point Review of Systems is negative other than noted in the HPI or here.     Past Medical History    I have reviewed this patient's medical history and updated it with pertinent information if needed.   Past Medical History:   Diagnosis Date     Abscess      Hypertension      Obesity      Sleep apnea     NEVER BEEN DIAGNOSED       Past Surgical History   I have reviewed this patient's surgical history and updated it with pertinent information if needed.  Past Surgical History:   Procedure Laterality Date     EXCISE LESION TRUNK  5/11/2012    Procedure:EXCISE LESION TRUNK; excision back cyst; Surgeon:ELENA LEE; Location:[facility]     ORTHOPEDIC SURGERY      >15 on right hand  / 1on left       Social History   I have reviewed this patient's social history and updated it with pertinent information if needed.  Social History     Tobacco Use     Smoking status: Never Smoker   Substance Use Topics     Alcohol use: Yes      Comment: 5/YEAR     Drug use: No     Family History   I have reviewed this patient's family history and updated it with pertinent information if needed.   No family history on file.    Prior to Admission Medications   Prior to Admission Medications   Prescriptions Last Dose Informant Patient Reported? Taking?   NONFORMULARY 4/30/2019 at Unknown time Self Yes Yes   Sig: Take 1 tablet by mouth 2 times daily -  CIRCULEG - combination nutritional supplement to improve circulation   acetaminophen (TYLENOL) 325 MG tablet 4/30/2019 at Unknown time Self Yes Yes   Sig: Take 975 mg by mouth daily with Ibuprofen   calcium carbonate 600 mg-vitamin D 400 units (CALTRATE) 600-400 MG-UNIT per tablet 4/30/2019 at Unknown time Self Yes Yes   Sig: Take 1 tablet by mouth daily   diltiazem ER (DILT-XR) 180 MG 24 hr capsule 4/30/2019 at AM Self Yes Yes   Sig: Take 180 mg by mouth daily   ibuprofen (ADVIL/MOTRIN) 200 MG tablet 4/30/2019 at Unknown time Self Yes Yes   Sig: Take 800 mg by mouth daily with Acetaminophen   lisinopril (PRINIVIL,ZESTRIL) 40 MG tablet 4/30/2019 at AM Self Yes Yes   Sig: Take 40 mg by mouth daily.   vitamin D3 (CHOLECALCIFEROL) 1000 units (25 mcg) tablet 4/30/2019 at Unknown time Self Yes Yes   Sig: Take 2,000 Units by mouth daily      Facility-Administered Medications: None     Allergies   No Known Allergies    Physical Exam   Vital Signs: Temp: 97.7  F (36.5  C) Temp src: Temporal BP: 133/71 Pulse: 100 Heart Rate: 101 Resp: 20 SpO2: 97 % O2 Device: None (Room air)    Weight: 528 lbs 12.8 oz    Constitutional: awake, alert, cooperative, no apparent distress, and appears stated age  Eyes: Lids and lashes normal, pupils equal, round and reactive to light, extra ocular muscles intact, sclera clear, conjunctiva normal  ENT: Normocephalic, without obvious abnormality, atraumatic, sinuses nontender on palpation, external ears without lesions, oral pharynx with moist mucous membranes  Hematologic / Lymphatic: no  cervical lymphadenopathy  Respiratory: Decreased breath sounds secondary to body habitus, otherwise clear bilaterally with no wheezing  Cardiovascular: Tachycardic with regular rhythm, no murmurs/rubs/gallops.  3+ lymphedema of his bilateral lower extremities.  GI: Obese otherwise nontender, nondistended, bowel sounds are present.  Skin: There is left lower extremity wound that is with very minimal clear drainage, no significant erythema or purulence.  Musculoskeletal: There is no redness, warmth, or swelling of the joints.  Full range of motion noted.  Motor strength is 5 out of 5 all extremities bilaterally.  Tone is normal.  Neurologic: Awake, alert, oriented to name, place and time.  Cranial nerves II-XII are grossly intact.  Motor is 5 out of 5 bilaterally.  Sensory is intact.  Babinski down going,  Neuropsychiatric: normal mood and affect    Data   Data reviewed today: I reviewed all medications, new labs and imaging results over the last 24 hours. I personally reviewed the EKG tracing showing see below.        Most Recent 3 CBC's:  Recent Labs   Lab Test 04/30/19 1823   WBC 8.8   HGB 13.0*   MCV 91        Most Recent 3 BMP's:  Recent Labs   Lab Test 04/30/19  1823      POTASSIUM 3.6   CHLORIDE 105   CO2 27   BUN 14   CR 0.76   ANIONGAP 8   DENG 9.2   *     Most Recent 3 INR's:No lab results found.  Most Recent 3 Troponin's:  Recent Labs   Lab Test 04/30/19  1823   TROPI 16.557*     Most Recent 3 BNP's:  Recent Labs   Lab Test 04/30/19  1823   NTBNPI 2,872*     Most Recent D-dimer:  Recent Labs   Lab Test 04/30/19  1823   DD 0.8*     No results found for this or any previous visit (from the past 24 hour(s)).

## 2019-05-01 NOTE — PLAN OF CARE
Discharge Planner OT   Patient plan for discharge: Home.   Current status: Eval complete and treatment initiated. Patient normally only able to ambulate ~100 feet at baseline. Today walked 75 feet x2 with prolonged rest break in the middle with BP to 201/102, .   Barriers to return to prior living situation: None.   Recommendations for discharge: Home with OP CR.  Rationale for recommendations: Would benefit from monitored exercise program.       Entered by: Vicki Gonzalez 05/01/2019 11:32 AM

## 2019-05-01 NOTE — PROGRESS NOTES
[facility]    Hospitalist Progress Note    Assessment & Plan   Sarbjit Tran is a 53 year old male with PMHx of hypertension, morbid obesity and prediabetes who was admitted on 4/30/2019 with an NSTEMI. He was taken emergently to the cath lab and     Acute vs Subacute NSTEMI, s/p JEREL to proximal LAD  CAD  Hypertension  Dyslipidemia  PTA meds: lisinopril 40mg daily, diltiazem 180mg daily  Presented with complaints of chest pain and found to have an elevated trop. Sx began 5d prior to admission and had never really resolved. His chest pain persisted despite initiation of treatment in the ED, prompting cardiology to take him to the cath lab on 4/30 PM. Coronary angiogram showed 99% proximal LAD stenosis and had JEREL placed to the proximal LAD, had minimal residual disease in the LCx and RCA. Trops peaked at 102 and have been down trending since stent placement.  -- cont DAPT with aspirin and Brilinta  -- FLP with LDL 91, HDL 36 and  -- started on atorvastatin 40mg daily  -- started on metoprolol 25mg BID and lisinopril decreased to 20mg daily  -- diuresis as below  -- cardiac rehab following    Ischemic Cardiomyopathy:  Echo this stay showed EF 40-45% with areas of severe hypokinesis and no significant valve disease (limited study)  -- given anterior infarct it is suspected he will need ongoing maintenance with diuretics  -- given Lasix 40mg IV x1 today with plan to transition to Lasix 40mg po BID tomorrow, has also been started on spironolactone 12.5mg daily today    Morbid Obesity  BMI 78 this stay. Follow up with PCP to determine long-term strategy for wt loss    Bilateral LE Lymphedema:  ACE wraps to bilateral LEs     FEN: no IVFs, lytes stable, cardiac diet  DVT Prophylaxis: PCDs  Code Status: Full Code    Disposition: Anticipate discharge home in the next 1-2d, pending recommendations per cardiology.     Louise Harvey    Interval History   Seen this afternoon. Resting  comfortably in chair. No cp/sob at rest. Endorsed dyspnea earlier today when working with therapy. Appetite okay. No n/v. Urinating without difficulty.     -Data reviewed today: I reviewed all new labs and imaging results over the last 24 hours. I personally reviewed no images or EKG's today.    Physical Exam   Temp: 98.6  F (37  C) Temp src: Oral BP: (!) 221/123 Pulse: 87 Heart Rate: 143 Resp: 18 SpO2: 97 % O2 Device: BiPAP/CPAP Oxygen Delivery: 15 LPM  Vitals:    04/30/19 1927 05/01/19 0315   Weight: (!) 239.9 kg (528 lb 12.8 oz) (!) 240 kg (529 lb)     Vital Signs with Ranges  Temp:  [97.7  F (36.5  C)-98.8  F (37.1  C)] 98.6  F (37  C)  Pulse:  [] 87  Heart Rate:  [] 143  Resp:  [10-24] 18  BP: (115-221)/() 221/123  SpO2:  [92 %-98 %] 97 %  I/O last 3 completed shifts:  In: 240 [P.O.:240]  Out: 1200 [Urine:1200]    Constitutional: Resting comfortably, alert and conversing appropriately, NAD  Respiratory: CTAB, no wheeze/rales/rhonchi, no increased work of breathing  Cardiovascular: HRRR, no MGR +chronic LE lymphedema  GI: obese, S, NT, +BS  Skin/Integumen: warm/dry  Other:      Medications     - MEDICATION INSTRUCTIONS -       Percutaneous Coronary Intervention orders placed (this is information for BPA alerting)         acetaminophen  975 mg Oral Daily     aspirin  81 mg Oral Daily     atorvastatin  40 mg Oral Daily     furosemide  40 mg Intravenous Once     [START ON 5/2/2019] furosemide  40 mg Oral BID     lisinopril  20 mg Oral Daily     metoprolol tartrate  25 mg Oral BID     spironolactone  12.5 mg Oral Daily     ticagrelor  90 mg Oral BID       Data   Recent Labs   Lab 05/01/19  1527 05/01/19  1241 05/01/19  0550  04/30/19  1823   WBC  --   --  8.5  --  8.8   HGB  --   --  12.1*  --  13.0*   MCV  --   --  92  --  91   PLT  --   --  165  --  199   NA  --   --  140  --  140   POTASSIUM  --   --  3.8  --  3.6   CHLORIDE  --   --  106  --  105   CO2  --   --  26  --  27   BUN  --   --  13   --  14   CR  --   --  0.73  --  0.76   ANIONGAP  --   --  8  --  8   DENG  --   --  8.4*  --  9.2   GLC  --   --  124*  --  112*   TROPI 24.358* 30.121* 59.149*   < > 16.557*    < > = values in this interval not displayed.       No results found for this or any previous visit (from the past 24 hour(s)).

## 2019-05-01 NOTE — PROGRESS NOTES
05/01/19 1122   Quick Adds   Type of Visit Initial Occupational Therapy Evaluation   Living Environment   Lives With spouse   Living Arrangements house   Living Environment Comment 4 TOMAS, full flight to office on second level. Reports    Self-Care   Regular Exercise No   Activity/Exercise/Self-Care Comment Walks no more than 100 feet.    General Information   Onset of Illness/Injury or Date of Surgery - Date 04/30/19   Referring Physician Reagan   Patient/Family Goals Statement Go home.   Additional Occupational Profile Info/Pertinent History of Current Problem Admitted with chest pain. Dx with NSTEMI, s/p JEREL to LAD. EF 40-45%.    Precautions/Limitations no known precautions/limitations   Cognitive Status Examination   Cognitive Comment No cognitive deficits.   Pain Assessment   Patient Currently in Pain No   Range of Motion (ROM)   ROM Comment Finger deformities R hand.   Mobility   Bed Mobility Comments MOD A at trunk as he can't push with RUE.    Transfer Skill: Sit to Stand   Level of [place]: Sit/Stand stand-by assist   Balance   Balance Comments CG A for balance with walker. Knees buckled on first attempt without AD.    Eating/Self Feeding   Level of [place]: Eating independent   Activities of Daily Living Analysis   Impairments Contributing to Impaired Activities of Daily Living   (decreased exercise)   General Therapy Interventions   Planned Therapy Interventions home program guidelines;progressive activity/exercise;risk factor education   Clinical Impression   Criteria for Skilled Therapeutic Interventions Met yes, treatment indicated   OT Diagnosis Decreased exercise   Influenced by the following impairments need for monitored exercise, cardiac education   Assessment of Occupational Performance 1-3 Performance Deficits   Identified Performance Deficits exercise tolerance   Clinical Decision Making (Complexity) Low complexity   Therapy Frequency 2 times/day   Predicted Duration of Therapy  Intervention (days/wks)  2 days   Anticipated Discharge Disposition Home with Outpatient Therapy   Risks and Benefits of Treatment have been explained. Yes   Patient, Family & other staff in agreement with plan of care Yes   Total Evaluation Time   Total Evaluation Time (Minutes) 10

## 2019-05-01 NOTE — PROVIDER NOTIFICATION
MD Notification    Notified Person: MD    Notified Person Name: Dr. Pedraza    Notification Date/Time: 5/1/19 0005    Notification Interaction: Telephone    Purpose of Notification: Increasing chest pain post angio.    Orders Received: Morphine. 2mg q1hr PRN    Comments:

## 2019-05-01 NOTE — CONSULTS
"NUTRITION EDUCATION    REASON FOR ASSESSMENT:  Nutrition education on American Heart Association (AHA) Heart Healthy Diet.    NUTRITION HISTORY:  Visited with pt this morning  Pt tells me that he orders the Nutrisystem Plan - \"I get 3 meals per day and 2 snacks - 28 day plan\"  He notes that he will add frozen veggies and fruit to the meals  Drinks water or Crystal Light during the day (no sugared beverages)  States that he doesn't eat any additional snacks between meals    CURRENT DIET ORDER:  2400 mg Na, LSF    NUTRITION DIAGNOSIS:  Food- and nutrition-related knowledge deficit R/t no prior diet education AEB this is a new dx for pt     INTERVENTIONS:  Nutrition Prescription:    Recommended AHA Heart Healthy Diet    Implementation:     Nutrition Education (Content):  a) reviewed Heart Healthy Diet guidelines  b) provided heart healthy diet handout    Nutrition Education (Application):  a) Discussed current eating habits and recommended food choices    Anticipate good compliance    Diet Education - refer to Education flowsheet    Goals:    Patient verbalizes understanding of diet     All of the above goals met during education session    Follow Up/Monitoring:    Provided RD contact information for future questions    WOULD RECOMMEND OUTPT NUTRITION COUNSELING FOR WEIGHT LOSS         "

## 2019-05-01 NOTE — ED NOTES
Pain is still 2/10 at this time. Per Cards MD, to continue to increase Nitroglycerin drip rate, see MAR.

## 2019-05-01 NOTE — PLAN OF CARE
"Pt A/O. VSS. Up with ax1. Tele shows sr. Pt reported CP early when arriving post angio, started at 3 and went to 6/10, morphine given x1, pain was relieved. RRA site cdi. Resting well.    BP (!) 160/96   Pulse 91   Temp 98.7  F (37.1  C) (Oral)   Resp 14   Ht 1.753 m (5' 9\")   Wt (!) 240 kg (529 lb)   SpO2 98%   BMI 78.12 kg/m        "

## 2019-05-02 ENCOUNTER — APPOINTMENT (OUTPATIENT)
Dept: OCCUPATIONAL THERAPY | Facility: CLINIC | Age: 54
End: 2019-05-02
Payer: COMMERCIAL

## 2019-05-02 LAB
ANION GAP SERPL CALCULATED.3IONS-SCNC: 6 MMOL/L (ref 3–14)
BUN SERPL-MCNC: 15 MG/DL (ref 7–30)
CALCIUM SERPL-MCNC: 9.1 MG/DL (ref 8.5–10.1)
CHLORIDE SERPL-SCNC: 104 MMOL/L (ref 94–109)
CO2 SERPL-SCNC: 29 MMOL/L (ref 20–32)
CREAT SERPL-MCNC: 0.86 MG/DL (ref 0.66–1.25)
GFR SERPL CREATININE-BSD FRML MDRD: >90 ML/MIN/{1.73_M2}
GLUCOSE SERPL-MCNC: 112 MG/DL (ref 70–99)
INTERPRETATION ECG - MUSE: NORMAL
POTASSIUM SERPL-SCNC: 3.8 MMOL/L (ref 3.4–5.3)
SODIUM SERPL-SCNC: 139 MMOL/L (ref 133–144)

## 2019-05-02 PROCEDURE — 97110 THERAPEUTIC EXERCISES: CPT | Mod: GO

## 2019-05-02 PROCEDURE — 99232 SBSQ HOSP IP/OBS MODERATE 35: CPT | Performed by: INTERNAL MEDICINE

## 2019-05-02 PROCEDURE — 25000132 ZZH RX MED GY IP 250 OP 250 PS 637: Performed by: INTERNAL MEDICINE

## 2019-05-02 PROCEDURE — 21000001 ZZH R&B HEART CARE

## 2019-05-02 PROCEDURE — 80048 BASIC METABOLIC PNL TOTAL CA: CPT | Performed by: NURSE PRACTITIONER

## 2019-05-02 PROCEDURE — 25000132 ZZH RX MED GY IP 250 OP 250 PS 637: Performed by: NURSE PRACTITIONER

## 2019-05-02 PROCEDURE — 36415 COLL VENOUS BLD VENIPUNCTURE: CPT | Performed by: NURSE PRACTITIONER

## 2019-05-02 PROCEDURE — 25000132 ZZH RX MED GY IP 250 OP 250 PS 637

## 2019-05-02 PROCEDURE — 25000132 ZZH RX MED GY IP 250 OP 250 PS 637: Performed by: STUDENT IN AN ORGANIZED HEALTH CARE EDUCATION/TRAINING PROGRAM

## 2019-05-02 RX ORDER — LISINOPRIL 40 MG/1
40 TABLET ORAL DAILY
Status: DISCONTINUED | OUTPATIENT
Start: 2019-05-03 | End: 2019-05-03 | Stop reason: HOSPADM

## 2019-05-02 RX ORDER — SPIRONOLACTONE 25 MG
12.5 TABLET ORAL ONCE
Status: COMPLETED | OUTPATIENT
Start: 2019-05-02 | End: 2019-05-02

## 2019-05-02 RX ORDER — LISINOPRIL 20 MG/1
20 TABLET ORAL ONCE
Status: COMPLETED | OUTPATIENT
Start: 2019-05-02 | End: 2019-05-02

## 2019-05-02 RX ORDER — METOPROLOL TARTRATE 25 MG/1
25 TABLET, FILM COATED ORAL ONCE
Status: COMPLETED | OUTPATIENT
Start: 2019-05-02 | End: 2019-05-02

## 2019-05-02 RX ORDER — SPIRONOLACTONE 25 MG/1
25 TABLET ORAL DAILY
Status: DISCONTINUED | OUTPATIENT
Start: 2019-05-03 | End: 2019-05-03 | Stop reason: HOSPADM

## 2019-05-02 RX ORDER — METOPROLOL TARTRATE 50 MG
50 TABLET ORAL 2 TIMES DAILY
Status: DISCONTINUED | OUTPATIENT
Start: 2019-05-02 | End: 2019-05-03 | Stop reason: HOSPADM

## 2019-05-02 RX ADMIN — FUROSEMIDE 40 MG: 40 TABLET ORAL at 08:42

## 2019-05-02 RX ADMIN — FUROSEMIDE 40 MG: 40 TABLET ORAL at 16:38

## 2019-05-02 RX ADMIN — ACETAMINOPHEN 975 MG: 325 TABLET, FILM COATED ORAL at 08:42

## 2019-05-02 RX ADMIN — METOPROLOL TARTRATE 25 MG: 25 TABLET ORAL at 08:42

## 2019-05-02 RX ADMIN — LISINOPRIL 20 MG: 20 TABLET ORAL at 11:23

## 2019-05-02 RX ADMIN — TICAGRELOR 90 MG: 90 TABLET ORAL at 20:53

## 2019-05-02 RX ADMIN — METOPROLOL TARTRATE 50 MG: 50 TABLET ORAL at 20:53

## 2019-05-02 RX ADMIN — TICAGRELOR 90 MG: 90 TABLET ORAL at 08:42

## 2019-05-02 RX ADMIN — METOPROLOL TARTRATE 25 MG: 25 TABLET ORAL at 13:17

## 2019-05-02 RX ADMIN — LISINOPRIL 20 MG: 20 TABLET ORAL at 08:42

## 2019-05-02 RX ADMIN — ATORVASTATIN CALCIUM 40 MG: 40 TABLET, FILM COATED ORAL at 08:42

## 2019-05-02 RX ADMIN — SPIRONOLACTONE 12.5 MG: 25 TABLET ORAL at 08:42

## 2019-05-02 RX ADMIN — ASPIRIN 81 MG: 81 TABLET, COATED ORAL at 08:42

## 2019-05-02 RX ADMIN — SPIRONOLACTONE 12.5 MG: 25 TABLET ORAL at 11:22

## 2019-05-02 ASSESSMENT — MIFFLIN-ST. JEOR: SCORE: 3199.05

## 2019-05-02 NOTE — PLAN OF CARE
7a-730pm, 5/1/2019  No change, denies any pain Has been up with CR, Bp high after ambulating, Cards  aware, adjusting medications. Right radial site is CDI, reminders  to be careful with it. Bp trending down.

## 2019-05-02 NOTE — PLAN OF CARE
BP elevated, worsens with activity. MD increased Metoprolol, Lisinopril and Spironolactone. Tele SR, ST with ambulation. C/o MONDRAGON. Denies CP. R radial site CDI, CMS intact. Ace wraps applied to bilateral LEs. Fredy lotion applied to hardened skin to both legs as well. Pt ambulates with SBA and walker. Continue to monitor BP closely.

## 2019-05-02 NOTE — PLAN OF CARE
A&Ox4. Systolic BP in the 140's. HR 's. Other VSS on RA. Denies pain. Up independently from chair to/from bathroom. R radial site ecchymotic but soft. CMS intact. Diuresing with Lasix. Down 5 lbs since yesterday. Plan for cardiac rehab. Continue to monitor.

## 2019-05-02 NOTE — PROGRESS NOTES
[facility]  Cardiology Progress Note  Date of Service: 05/02/2019    Assessment & Plan    Sarbjit Tran is a 53 year old male with past medical history significant for morbid obesity, hypertension, lymphedema and prediabetes admitted on 4/30/2019 with 5 days of persistent central chest discomfort that escalated to 10/10 and prompted his presentation to the ED.     Assessment:   1. NSTEMI:    Troponin 16.5-> 102.3-> 59.14 ->30 ->24    Coronary angiogram revealed culprit lesion of 99% proximal LAD stenosis (high risk and located at the bifurcation and thrombotic).     Status post JEREL to proximal LAD    Minimal residual CAD in LCx and RCA    DAPT with aspirin and Brilinta    Preserved renal function post angiogram    , HDL 36, LDL 91, trigs 178-> Atorvastatin 40 mg daily    2. Ischemic cardiomyopathy:    BNP 2872    Distal anterior and apical akinesis and LVEF 40-45%    Metoprolol 25 mg BID, lisinopril 20 mg daily    IV Lasix 40 mg x 1 (5/1)     Spironolactone 12.5 mg daily (5/1)    Lasix 40 mg BID (5/2)    Weight down 6 lbs    Net negative 1.8 L    3. Hypertension: [PTA: lisinopril 40 mg daily and diltiazem 180 mg daily]    Metoprolol 25 mg BID and lisinopril 20 mg daily    Uncontrolled with spike to     Plan:  1. Cardiac rehab  2. Increase Lisinopril to PTA 40 mg daily and give an additional 20 mg now  3. Increase Spironolactone to 25 mg daily and given an additional 12.5 mg now  4. Daily BMP  5. Lymphedema therapy referral     RITA PEREZ CNP  Pager:  [phone]   (7am - 5pm, M-F)    Interval History   Resting 's and spike to 190's with cardiac rehab. Short of breath at baseline, but improvement from yesterday.     Physical Exam   Temp: 98.3  F (36.8  C) Temp src: Oral BP: 144/80 Pulse: 93 Heart Rate: 113 Resp: 18 SpO2: 96 % O2 Device: None (Room air)    Vitals:    04/30/19 1927 05/01/19 0315 05/02/19 0500   Weight: (!) 239.9 kg (528 lb 12.8 oz) (!) 240 kg (529 lb)  (!) 237.4 kg (523 lb 4.8 oz)       Constitutional:   NAD   Skin:   Warm and dry   Head:   Nontraumatic   Neck:   Unable to assess   Lungs:   symmetric, clear to auscultation   Cardiovascular:   regular rate and rhythm   Abdomen:   Obese   Extremities and Back:   RRA 2+ peripheral pulses and CDI. Chronic lymphedema   Neurological:   Grossly nonfocal       Medications     - MEDICATION INSTRUCTIONS -       Percutaneous Coronary Intervention orders placed (this is information for BPA alerting)         acetaminophen  975 mg Oral Daily     aspirin  81 mg Oral Daily     atorvastatin  40 mg Oral Daily     furosemide  40 mg Oral BID     lisinopril  20 mg Oral Daily     metoprolol tartrate  25 mg Oral BID     spironolactone  12.5 mg Oral Daily     ticagrelor  90 mg Oral BID       Data     Most Recent 3 CBC's:  Recent Labs   Lab Test 05/01/19  0550 04/30/19  1823   WBC 8.5 8.8   HGB 12.1* 13.0*   MCV 92 91    199     Most Recent 3 BMP's:  Recent Labs   Lab Test 05/01/19  0550 04/30/19  1823    140   POTASSIUM 3.8 3.6   CHLORIDE 106 105   CO2 26 27   BUN 13 14   CR 0.73 0.76   ANIONGAP 8 8   DENG 8.4* 9.2   * 112*     Most Recent 3 Troponin's:  Recent Labs   Lab Test 05/01/19  1527 05/01/19  1241 05/01/19  0550   TROPI 24.358* 30.121* 59.149*     Most Recent Cholesterol Panel:  Recent Labs   Lab Test 05/01/19  0550   CHOL 163   LDL 91   HDL 36*   TRIG 178*

## 2019-05-02 NOTE — CONSULTS
"Care Coordinator was asked to help patient decide which [facility] Clinic/Hospital would be closest and most appropriate for patient to do his follow-up visits and a hospital if any procedures are needed. After discussing his options and his home being [place], it was decided that [facility] would be the best. [facility] has a [facility] Cardiologist(s) at the [facility] daily. It was also decided that he would go to the [facility] if he needed a procedure done. Reviewed this also with patient's wife who was in the room. She also was in agreement with this plan. It was recommended patient discharge with outpatient Cardiac Rehab.  Will continue to follow for discharge needs.    Addendum: PCP appointment made with [facility]. He no longer sees Dr Suazo, but he wanted to stay at this clinic because \"the staff all know me.\" Appointment made with Dr Linh Lopez. See AVS.  "

## 2019-05-02 NOTE — PROGRESS NOTES
[facility]    Hospitalist Progress Note    Assessment & Plan   Sarbjit Tran is a 53 year old male with PMHx of hypertension, morbid obesity and prediabetes who was admitted on 4/30/2019 with an NSTEMI. He was taken emergently to the cath lab and     Acute vs Subacute NSTEMI, s/p JEREL to proximal LAD  CAD  Hypertension  Dyslipidemia  PTA meds: lisinopril 40mg daily, diltiazem 180mg daily  Presented with complaints of chest pain and found to have an elevated trop. Sx began 5d prior to admission and had never really resolved. His chest pain persisted despite initiation of treatment in the ED, prompting cardiology to take him to the cath lab on 4/30 PM. Coronary angiogram showed 99% proximal LAD stenosis and had JEREL placed to the proximal LAD, had minimal residual disease in the LCx and RCA. Trops peaked at 102 and have been down trending since stent placement.  -- cont DAPT with aspirin and Brilinta  -- FLP with LDL 91, HDL 36 and  -- started on atorvastatin 40mg daily  -- titrating antihypertensives -- presently on regimen of metoprolol 50mg BID, lisinopril 40mg daily  -- diuresis as below  -- cardiac rehab following, BPs tend to increase significantly with exertion during sessions (upwards of 200s systolic) -- cards aware and readings acceptable from their standpoint    Ischemic Cardiomyopathy:  Echo this stay showed EF 40-45% with areas of severe hypokinesis and no significant valve disease (limited study)  -- given anterior infarct it is suspected he will need ongoing maintenance with diuretics  -- given Lasix 40mg IV x1 on 5/1 and transitioned to 40mg po BID today   -- spironolactone added this stay and dose titrated to 25mg daily    Morbid Obesity  BMI 78 this stay. Follow up with PCP to determine long-term strategy for wt loss.  A1C 5.9 this stay.    Bilateral LE Lymphedema:  ACE wraps to bilateral LEs   -- outpatient lymphedema clinic referral    FEN: no IVFs, lytes stable, cardiac  diet  DVT Prophylaxis: PCDs  Code Status: Full Code    Disposition: Anticipate discharge home in the next 1-2d, pending stable BP and recommendations per cardiology.     Louise Harvey    Interval History   Seen this afternoon. Just finished working with cardiac rehab. BPs spike after exertion with therapy. Denies dizziness/lightheadedness, cp/sob/cough, abd pain/n/v.    -Data reviewed today: I reviewed all new labs and imaging results over the last 24 hours. I personally reviewed no images or EKG's today.    Physical Exam   Temp: 98.3  F (36.8  C) Temp src: Oral BP: 129/73 Pulse: 85 Heart Rate: 143 Resp: 20 SpO2: 96 % O2 Device: None (Room air)    Vitals:    04/30/19 1927 05/01/19 0315 05/02/19 0500   Weight: (!) 239.9 kg (528 lb 12.8 oz) (!) 240 kg (529 lb) (!) 237.4 kg (523 lb 4.8 oz)     Vital Signs with Ranges  Temp:  [98.3  F (36.8  C)-98.6  F (37  C)] 98.3  F (36.8  C)  Pulse:  [] 85  Heart Rate:  [103-143] 143  Resp:  [18-20] 20  BP: (129-221)/() 129/73  SpO2:  [96 %-97 %] 96 %  I/O last 3 completed shifts:  In: 960 [P.O.:960]  Out: 1625 [Urine:1625]    Constitutional: Resting comfortably, alert and conversing appropriately, NAD  Respiratory: CTAB, no wheeze/rales/rhonchi, no increased work of breathing  Cardiovascular: HRRR, no MGR, chronic LE lymphedema  GI: obese, S, NT, +BS  Skin/Integumen: warm/dry  Other:      Medications     - MEDICATION INSTRUCTIONS -       Percutaneous Coronary Intervention orders placed (this is information for BPA alerting)         acetaminophen  975 mg Oral Daily     aspirin  81 mg Oral Daily     atorvastatin  40 mg Oral Daily     furosemide  40 mg Oral BID     [START ON 5/3/2019] lisinopril  40 mg Oral Daily     metoprolol tartrate  50 mg Oral BID     [START ON 5/3/2019] spironolactone  25 mg Oral Daily     ticagrelor  90 mg Oral BID       Data   Recent Labs   Lab 05/02/19  1250 05/01/19  1527 05/01/19  1241 05/01/19  0550  04/30/19  1823   WBC  --   --   --   8.5  --  8.8   HGB  --   --   --  12.1*  --  13.0*   MCV  --   --   --  92  --  91   PLT  --   --   --  165  --  199     --   --  140  --  140   POTASSIUM 3.8  --   --  3.8  --  3.6   CHLORIDE 104  --   --  106  --  105   CO2 29  --   --  26  --  27   BUN 15  --   --  13  --  14   CR 0.86  --   --  0.73  --  0.76   ANIONGAP 6  --   --  8  --  8   DENG 9.1  --   --  8.4*  --  9.2   *  --   --  124*  --  112*   TROPI  --  24.358* 30.121* 59.149*   < > 16.557*    < > = values in this interval not displayed.       No results found for this or any previous visit (from the past 24 hour(s)).

## 2019-05-02 NOTE — CONSULTS
Medication coverage check for Brilinta. Patient is eligible for $5 copay card. Monthly copay of $5.    Malini Etienne CphT  [facility] Liaison  Liaison Cell: [phone]

## 2019-05-02 NOTE — PLAN OF CARE
Discharge Planner OT   Patient plan for discharge: Home.  Current status: Continues to be hypertensive with short distance ambulation, /97, .   Barriers to return to prior living situation: None.  Recommendations for discharge: Home with OP CR.  Rationale for recommendations: Patient would benefit from a monitored exercise program.       Entered by: Vicki Gonzalez 05/02/2019 9:55 AM        PM: After increase in meds, patient continues to be hypertensive with ambulation, 221/115. Discussed with nurse and MDs. Due to the chronic nature of patient's lymphedema, he is best served in an OP setting.

## 2019-05-03 ENCOUNTER — APPOINTMENT (OUTPATIENT)
Dept: OCCUPATIONAL THERAPY | Facility: CLINIC | Age: 54
End: 2019-05-03
Payer: COMMERCIAL

## 2019-05-03 VITALS
BODY MASS INDEX: 46.65 KG/M2 | DIASTOLIC BLOOD PRESSURE: 106 MMHG | RESPIRATION RATE: 16 BRPM | HEART RATE: 88 BPM | SYSTOLIC BLOOD PRESSURE: 209 MMHG | HEIGHT: 69 IN | OXYGEN SATURATION: 98 % | TEMPERATURE: 98.5 F | WEIGHT: 315 LBS

## 2019-05-03 LAB
ANION GAP SERPL CALCULATED.3IONS-SCNC: 5 MMOL/L (ref 3–14)
BUN SERPL-MCNC: 16 MG/DL (ref 7–30)
CALCIUM SERPL-MCNC: 8.6 MG/DL (ref 8.5–10.1)
CHLORIDE SERPL-SCNC: 103 MMOL/L (ref 94–109)
CO2 SERPL-SCNC: 30 MMOL/L (ref 20–32)
CREAT SERPL-MCNC: 0.95 MG/DL (ref 0.66–1.25)
GFR SERPL CREATININE-BSD FRML MDRD: 90 ML/MIN/{1.73_M2}
GLUCOSE SERPL-MCNC: 126 MG/DL (ref 70–99)
POTASSIUM SERPL-SCNC: 4 MMOL/L (ref 3.4–5.3)
SODIUM SERPL-SCNC: 138 MMOL/L (ref 133–144)

## 2019-05-03 PROCEDURE — 36415 COLL VENOUS BLD VENIPUNCTURE: CPT | Performed by: NURSE PRACTITIONER

## 2019-05-03 PROCEDURE — 99239 HOSP IP/OBS DSCHRG MGMT >30: CPT | Performed by: INTERNAL MEDICINE

## 2019-05-03 PROCEDURE — 25000132 ZZH RX MED GY IP 250 OP 250 PS 637

## 2019-05-03 PROCEDURE — 99232 SBSQ HOSP IP/OBS MODERATE 35: CPT | Performed by: INTERNAL MEDICINE

## 2019-05-03 PROCEDURE — 25000132 ZZH RX MED GY IP 250 OP 250 PS 637: Performed by: INTERNAL MEDICINE

## 2019-05-03 PROCEDURE — 25000132 ZZH RX MED GY IP 250 OP 250 PS 637: Performed by: STUDENT IN AN ORGANIZED HEALTH CARE EDUCATION/TRAINING PROGRAM

## 2019-05-03 PROCEDURE — 25000132 ZZH RX MED GY IP 250 OP 250 PS 637: Performed by: NURSE PRACTITIONER

## 2019-05-03 PROCEDURE — 97110 THERAPEUTIC EXERCISES: CPT | Mod: GO | Performed by: OCCUPATIONAL THERAPIST

## 2019-05-03 PROCEDURE — 80048 BASIC METABOLIC PNL TOTAL CA: CPT | Performed by: NURSE PRACTITIONER

## 2019-05-03 RX ORDER — ATORVASTATIN CALCIUM 40 MG/1
40 TABLET, FILM COATED ORAL DAILY
Qty: 30 TABLET | Refills: 0 | Status: SHIPPED | OUTPATIENT
Start: 2019-05-04 | End: 2019-05-28

## 2019-05-03 RX ORDER — FUROSEMIDE 40 MG
40 TABLET ORAL DAILY
Qty: 30 TABLET | Refills: 0 | Status: SHIPPED | OUTPATIENT
Start: 2019-05-04 | End: 2019-05-28

## 2019-05-03 RX ORDER — SPIRONOLACTONE 25 MG/1
25 TABLET ORAL DAILY
Qty: 30 TABLET | Refills: 0 | Status: SHIPPED | OUTPATIENT
Start: 2019-05-04 | End: 2019-05-28

## 2019-05-03 RX ORDER — METOPROLOL TARTRATE 50 MG
50 TABLET ORAL 2 TIMES DAILY
Qty: 60 TABLET | Refills: 0 | Status: SHIPPED | OUTPATIENT
Start: 2019-05-03 | End: 2019-05-14

## 2019-05-03 RX ORDER — NITROGLYCERIN 0.4 MG/1
TABLET SUBLINGUAL
Qty: 30 TABLET | Refills: 0 | Status: SHIPPED | OUTPATIENT
Start: 2019-05-03

## 2019-05-03 RX ORDER — FUROSEMIDE 40 MG
40 TABLET ORAL DAILY
Status: DISCONTINUED | OUTPATIENT
Start: 2019-05-04 | End: 2019-05-03 | Stop reason: HOSPADM

## 2019-05-03 RX ADMIN — ASPIRIN 81 MG: 81 TABLET, COATED ORAL at 08:26

## 2019-05-03 RX ADMIN — FUROSEMIDE 40 MG: 40 TABLET ORAL at 08:26

## 2019-05-03 RX ADMIN — ATORVASTATIN CALCIUM 40 MG: 40 TABLET, FILM COATED ORAL at 08:26

## 2019-05-03 RX ADMIN — TICAGRELOR 90 MG: 90 TABLET ORAL at 08:26

## 2019-05-03 RX ADMIN — LISINOPRIL 40 MG: 40 TABLET ORAL at 08:25

## 2019-05-03 RX ADMIN — SPIRONOLACTONE 25 MG: 25 TABLET ORAL at 08:26

## 2019-05-03 RX ADMIN — METOPROLOL TARTRATE 50 MG: 50 TABLET ORAL at 08:26

## 2019-05-03 RX ADMIN — ACETAMINOPHEN 975 MG: 325 TABLET, FILM COATED ORAL at 08:26

## 2019-05-03 ASSESSMENT — MIFFLIN-ST. JEOR: SCORE: 3168.66

## 2019-05-03 NOTE — PLAN OF CARE
VSS. BP elevated after cardiac rehab, cardiology aware and ok with discharge. Tele:SR IV out and monitor off. Right radial site CDI. Reviewed discharge instruction, medications and restrictions. Medications filled here and sent with patient. All questions and concerns were addressed. Pt states that he has all of his belongings. Brought down to door 4 via WC where his wife picked him up.

## 2019-05-03 NOTE — PROGRESS NOTES
"Cardiology Progress Note   Date of service: 19       Assessment and Plan:   Sarbjit Tran is a 53 year old male with past medical history significant for morbid obesity, hypertension, lymphedema and prediabetes admitted on 2019 with 5 days of persistent central chest discomfort that escalated to 10/10 and prompted his presentation to the ED.       1. Non-ST elevation myocardial infarction with anterior infarct, subtotal occluded LAD treated with PCI 2019    Likely will have some residual anterior hypokinesis.    Asymptomatic currently.  Tolerating cardiac rehabilitation.    Blood pressure improved, okay for discharge today on dual antiplatelet therapy.    Patient will follow-up at the [facility] clinic which is more convenient to his location.      2. Hypertension, improved control    Reduce furosemide from 40 milligrams twice daily down to just once per day for convenience    May need to uptitrate in the future as outpatient.    Continue the spironolactone that was added.  Stay off the diltiazem as it can worsen his lower extremity edema.    Continue on metoprolol 50 milligrams twice daily and lisinopril 40 milligrams daily as well.    Okay for discharge today.                 Interval History:   Patient feeling very well.  Has had significant diuresis with improvement in his blood pressure.  No recurrence of chest discomfort.  Would like to go home today.              Review of Systems:   As per subjective, otherwise 5 systems reviewed and negative.           Physical Exam:     Vitals were reviewed  Blood pressure 144/84, pulse 89, temperature 98.2  F (36.8  C), temperature source Oral, resp. rate 16, height 1.753 m (5' 9\"), weight (!) 234.3 kg (516 lb 9.6 oz), SpO2 96 %.  Temperatures:  Current - Temp: 98.2  F (36.8  C); Max - Temp  Av.1  F (36.7  C)  Min: 97.7  F (36.5  C)  Max: 98.6  F (37  C)  Respiration range: Resp  Av.3  Min: 16  Max: 20  Pulse range: Pulse  Av  " Min: 85  Max: 89  Blood pressure range: Systolic (24hrs), Av , Min:129 , Max:221   ; Diastolic (24hrs), Av, Min:73, Max:115    Pulse oximetry range: SpO2  Av %  Min: 96 %  Max: 98 %    Intake/Output Summary (Last 24 hours) at 5/3/2019 1000  Last data filed at 5/3/2019 0700  Gross per 24 hour   Intake 360 ml   Output 3575 ml   Net -3215 ml       Constitutional:   awake, alert, cooperative, no apparent distress, and appears stated age     Eyes:   Lids and lashes normal, pupils equal, round and reactive to light, extra ocular muscles intact, sclera clear, conjunctiva normal     Neck:   supple, symmetrical, trachea midline, no JVD     Back:   symmetric     Lungs:   Bibasilar crackles     Cardiovascular:   RRR     Abdomen:   benign     Musculoskeletal:   motor strength is 5 out of 5 all extremities bilaterally, right hand deformity     Neurologic:   Grossly nonfocal     Skin:   Venous stasis changes.            Medications:     Current Facility-Administered Medications Ordered in Epic   Medication Dose Route Frequency Last Rate Last Dose     acetaminophen (TYLENOL) tablet 325-650 mg  325-650 mg Oral Q4H PRN         acetaminophen (TYLENOL) tablet 975 mg  975 mg Oral Daily   975 mg at 19 0826     aspirin EC tablet 81 mg  81 mg Oral Daily   81 mg at 19 0826     atorvastatin (LIPITOR) tablet 40 mg  40 mg Oral Daily   40 mg at 19 0826     [START ON 2019] furosemide (LASIX) tablet 40 mg  40 mg Oral Daily         HYDROcodone-acetaminophen (NORCO) 5-325 MG per tablet 1-2 tablet  1-2 tablet Oral Q4H PRN         lisinopril (PRINIVIL/ZESTRIL) tablet 40 mg  40 mg Oral Daily   40 mg at 19 0825     melatonin tablet 1 mg  1 mg Oral At Bedtime PRN         metoprolol tartrate (LOPRESSOR) tablet 50 mg  50 mg Oral BID   50 mg at 19 0826     morphine (PF) injection 2 mg  2 mg Intravenous Q1H PRN   2 mg at 19 0034     naloxone (NARCAN) injection 0.1-0.4 mg  0.1-0.4 mg Intravenous Q2 Min  "PRN         nitroGLYcerin (NITROSTAT) sublingual tablet 0.4 mg  0.4 mg Sublingual Q5 Min PRN         Patient is already receiving anticoagulation with heparin, enoxaparin (LOVENOX), warfarin (COUMADIN)  or other anticoagulant medication   Does not apply Continuous PRN         Percutaneous Coronary Intervention orders placed (this is information for BPA alerting)   Does not apply DOES NOT GO TO MAR         spironolactone (ALDACTONE) tablet 25 mg  25 mg Oral Daily   25 mg at 05/03/19 0826     ticagrelor (BRILINTA) tablet 90 mg  90 mg Oral BID   90 mg at 05/03/19 0826     No current [facility]-ordered outpatient medications on file.                Data:   All laboratory data reviewed  Results for orders placed or performed during the hospital encounter of 04/30/19 (from the past 24 hour(s))   Care Transition RN/SW IP Consult    Narrative    Oneyda Street RN     5/2/2019  3:27 PM  Care Coordinator was asked to help patient decide which [facility]   Clinic/Hospital would be closest and most appropriate for patient   to do his follow-up visits and a hospital if any procedures are   needed. After discussing his options and his home being [place], it was decided that [facility] would be the   best. [facility] has a [facility] Cardiologist(s) at the [facility] daily. It was also decided that he would go to the   [facility] if he needed a procedure done. Reviewed   this also with patient's wife who was in the room. She also was   in agreement with this plan. It was recommended patient discharge   with outpatient Cardiac Rehab.  Will continue to follow for discharge needs.    Addendum: PCP appointment made with [facility]. He no longer sees Dr Suazo, but he wanted to stay   at this clinic because \"the staff all know me.\" Appointment made   with Dr Linh Lopez. See AVS.   Basic metabolic panel   Result Value Ref Range    Sodium 139 133 - 144 mmol/L    " Potassium 3.8 3.4 - 5.3 mmol/L    Chloride 104 94 - 109 mmol/L    Carbon Dioxide 29 20 - 32 mmol/L    Anion Gap 6 3 - 14 mmol/L    Glucose 112 (H) 70 - 99 mg/dL    Urea Nitrogen 15 7 - 30 mg/dL    Creatinine 0.86 0.66 - 1.25 mg/dL    GFR Estimate >90 >60 mL/min/[1.73_m2]    GFR Estimate If Black >90 >60 mL/min/[1.73_m2]    Calcium 9.1 8.5 - 10.1 mg/dL   Basic metabolic panel   Result Value Ref Range    Sodium 138 133 - 144 mmol/L    Potassium 4.0 3.4 - 5.3 mmol/L    Chloride 103 94 - 109 mmol/L    Carbon Dioxide 30 20 - 32 mmol/L    Anion Gap 5 3 - 14 mmol/L    Glucose 126 (H) 70 - 99 mg/dL    Urea Nitrogen 16 7 - 30 mg/dL    Creatinine 0.95 0.66 - 1.25 mg/dL    GFR Estimate 90 >60 mL/min/[1.73_m2]    GFR Estimate If Black >90 >60 mL/min/[1.73_m2]    Calcium 8.6 8.5 - 10.1 mg/dL       All laboratory data reviewed  Lab Results   Component Value Date    CHOL 163 05/01/2019     Lab Results   Component Value Date    HDL 36 05/01/2019     Lab Results   Component Value Date    LDL 91 05/01/2019     Lab Results   Component Value Date    TRIG 178 05/01/2019     No results found for: CHOLHDLRATIO  TSH   Date Value Ref Range Status   04/30/2019 1.05 0.40 - 4.00 mU/L Final     Last Basic Metabolic Panel:  Lab Results   Component Value Date     05/03/2019      Lab Results   Component Value Date    POTASSIUM 4.0 05/03/2019     Lab Results   Component Value Date    CHLORIDE 103 05/03/2019     Lab Results   Component Value Date    DENG 8.6 05/03/2019     Lab Results   Component Value Date    CO2 30 05/03/2019     Lab Results   Component Value Date    BUN 16 05/03/2019     Lab Results   Component Value Date    CR 0.95 05/03/2019     Lab Results   Component Value Date     05/03/2019     Lab Results   Component Value Date    WBC 8.5 05/01/2019     Lab Results   Component Value Date    RBC 4.06 05/01/2019     Lab Results   Component Value Date    HGB 12.1 05/01/2019     Lab Results   Component Value Date    HCT 37.2  05/01/2019     Lab Results   Component Value Date    MCV 92 05/01/2019     Lab Results   Component Value Date    MCH 29.8 05/01/2019     Lab Results   Component Value Date    MCHC 32.5 05/01/2019     Lab Results   Component Value Date    RDW 13.9 05/01/2019     Lab Results   Component Value Date     05/01/2019

## 2019-05-03 NOTE — PLAN OF CARE
"Pt A/O. VSS. Up with sba/ind. Tele shows SR. Pt denies any CP or SOB. Possible discharge today.     /79   Pulse 89   Temp 98.4  F (36.9  C) (Oral)   Resp 16   Ht 1.753 m (5' 9\")   Wt (!) 237.4 kg (523 lb 4.8 oz)   SpO2 98%   BMI 77.28 kg/m        "

## 2019-05-03 NOTE — DISCHARGE INSTRUCTIONS
Cardiac Angioplasty/Stent Discharge Instructions - Radial    After you go home:      Have an adult stay with you until tomorrow.    Drink extra fluids for 2 days.    You may resume your normal diet.    No smoking       For 24 hours - due to the sedation you received:    Relax and take it easy.    Do NOT make any important or legal decisions.    Do NOT drive or operate machines at home or at work.    Do NOT drink alcohol.    Care of Wrist Puncture Site:      For the first 24 hrs - check the puncture site every 1-2 hours while awake.    It is normal to have soreness at the puncture site and mild tingling in your hand for up to 3 days.    Remove the bandaid after 24 hours. If there is minor oozing, apply another bandaid and remove it after 12 hours.    You may shower tomorrow.  Do NOT take a bath, or use a hot tub or pool for at least 3 days. Do NOT scrub the site. Do not use lotion or powder near the puncture site.           Activity:          For 2 days:     do not use your hand or arm to support your weight (such as rising from a chair)     do not bend your wrist (such as lifting a garage door).    do not lift more than 5 pounds or exercise your arm (such as tennis, golf or bowling).    Do NOT do any heavy activity such as exercise, lifting, or straining.     Bleeding:      If you start bleeding from the site in your wrist, sit down and press firmly on/above the site for 10 minutes.     Once bleeding stops, keep arm still for 2 hours.     Call [facility] as soon as you can.       Call 911 right away if you have heavy bleeding or bleeding that does not stop.      Medicines:      If you are taking an antiplatelet medication such as Plavix, Brilinta or Effient, do not stop taking it until you talk to your cardiologist.        If you are on Metformin (Glucophage), do not restart it until you have blood tests (within 2 to 3 days after discharge).  After you have your blood drawn, you may restart the Metformin.     Take  your medications, including blood thinners, unless your provider tells you not to.  If you take Coumadin (Warfarin), have your INR checked by your provider in  3-5 days. Call your clinic to schedule this.    If you have stopped any medicines, check with your provider about when to restart them.    Follow Up Appointments:      Follow up with [facility] Nurse Practitioner at [facility] of patient preference in 7-10 days.    Cardiac Rehab will contact you for follow up care.    Call the clinic if:      You have a large or growing hard lump around the site.    The site is red, swollen, hot or tender.    Blood or fluid is draining from the site.    You have chills or a fever greater than 101 F (38 C).    Your arm feels numb, cool or changes color.    You have hives, a rash or unusual itching.    Any questions or concerns.    Other Instructions:      If you received a stent - carry your stent card with you at all times.      [facility] [place]:    [phone] [facility] (7 days a week)

## 2019-05-03 NOTE — DISCHARGE SUMMARY
[facility]    Discharge Summary  Hospitalist    Date of Admission:  4/30/2019  Date of Discharge:  5/3/2019  Discharging Provider: Louise Harvey    Discharge Diagnoses   NSTEMI, s/p JEREL to proximal LAD  CAD  Hypertension  Dyslipidemia  Ischemic Cardiomyopathy  Morbid Obesity  Bilateral LE Lymphedema    History of Present Illness   Sarbjit Tran is a 53 year old male with PMHx of hypertension, morbid obesity and prediabetes who was admitted on 4/30/2019 with an NSTEMI. He was taken emergently to the cath lab and     Hospital Course   Sarbjit Tran was admitted on 4/30/2019.  The following problems were addressed during his hospitalization:     NSTEMI, s/p JEREL to proximal LAD  CAD  Hypertension  Dyslipidemia  PTA meds: lisinopril 40mg daily, diltiazem 180mg daily  Presented with complaints of chest pain and found to have an elevated trop. Sx began 5d prior to admission and had never really resolved. His chest pain persisted despite initiation of treatment in the ED, prompting cardiology to take him to the cath lab on 4/30 PM. Coronary angiogram showed 99% proximal LAD stenosis and had JEREL placed to the proximal LAD, had minimal residual disease in the LCx and RCA. Trops peaked at 102 and have been down trending since stent placement.  -- cont DAPT with aspirin and Brilinta x1yr  -- FLP with LDL 91, HDL 36 and  -- started on atorvastatin 40mg daily  -- discharged on regimen of metoprolol 50mg BID, lisinopril 40mg daily and diuretics as below  -- cardiac rehab following, BPs tend to increase significantly with exertion during sessions (upwards of 200s systolic) -- cards aware and readings acceptable from their standpoint     Ischemic Cardiomyopathy:  Echo this stay showed EF 40-45% with areas of severe hypokinesis and no significant valve disease (limited study).   Initiated on maintenance diuretics with Lasix 40mg po daily and spironolactone 25mg daily.     Morbid Obesity  BMI 78  this stay. Follow up with PCP to determine long-term strategy for wt loss.  A1C 5.9 this stay.     Bilateral LE Lymphedema:  ACE wraps to bilateral LEs as able  Outpatient lymphedema clinic referral    Louise Harvey    Significant Results and Procedures   4/30/19 Coronary Angiogram:  Summary:  NSTEMI with ongoing chest pain.  ELLIE 2 flow in LAD  PCI of mid LAD with JEREL, improved distal vessel to ELLIE 3 flow.   No compromise of large diagonal branch.     Diagnostic Findings:  Left Main   The vessel was visualized by selective angiography and is moderate in size. There was 0% vessel disease.   Left Anterior Descending   Prox LAD lesion is 99% stenosed. Culprit lesion. The lesion is type C - high risk, located at the bifurcation, bifurcated and thrombotic. Hoffman Classification for bifurcation: 0,1,0.   Left Circumflex   Large Cx system, gives rise to OM branches, and LPLs. Minimal CAD.   Right Coronary Artery   Minimal CAD in RCA system. Small PL branch.     Intervention:  Prox LAD lesion   Stent   Lesion length: 15 mm. The guidewire guidewire crosses lesionThe pre-interventional distal flow is decreased (ELLIE 2). Pre-stent angioplasty was performed using a CATH BALLOON EMERGE [id number] supply. A STENT SYNERGY DRUG ELUTING [id number] drug eluting stent was successfully placed. Post-stent angioplasty was performed using a CATH BALLOON NC EMERGE [id number] supply. Maximum pressure: 16 trena. The post-interventional distal flow is normal (ELLIE 3). The intervention was successful. No complications occurred at this lesion.   There is no residual stenosis post intervention.       4/30/19 Limited Echocardiogram  Interpretation Summary     The left ventricle is normal in size.  Left ventricular systolic function is mild to moderately reduced.  The visual ejection fraction is estimated at 40-45%.  Distal anterior, distal anteroseptal and apical hypokinesis, severe.  The right  ventricle is normal size.  The right ventricular systolic function is normal.  The study was technically difficult. Limited views were obtained. There is no comparison study available.  ______________________________________________________________      Left Ventricle  The left ventricle is normal in size. Left ventricular systolic function is  mild to moderately reduced. The visual ejection fraction is estimated at 40-  45%. Distal anterior, distal anteroseptal and apical hypokinesis, severe.     Right Ventricle  The right ventricle is normal size. The right ventricular systolic function is normal.     Aortic Valve  No hemodynamically significant valvular aortic stenosis.     Vessels  The aortic root is normal size.    Code Status   Full Code       Primary Care Physician   Jatinder Suazo    Physical Exam   Temp: 98.4  F (36.9  C) Temp src: Oral BP: 145/75 Pulse: 88 Heart Rate: 162 Resp: 16 SpO2: 95 % O2 Device: None (Room air)    Vitals:    05/01/19 0315 05/02/19 0500 05/03/19 0645   Weight: (!) 240 kg (529 lb) (!) 237.4 kg (523 lb 4.8 oz) (!) 234.3 kg (516 lb 9.6 oz)     Vital Signs with Ranges  Temp:  [97.7  F (36.5  C)-98.6  F (37  C)] 98.4  F (36.9  C)  Pulse:  [85-89] 88  Heart Rate:  [] 162  Resp:  [16-20] 16  BP: (129-221)/() 145/75  SpO2:  [95 %-98 %] 95 %  I/O last 3 completed shifts:  In: 360 [P.O.:360]  Out: 3575 [Urine:3575]    General: Resting comfortably, alert, conversive, NAD  CVS: HRRR, no MGR, chronic bilateral LE lymphedema  Respiratory: CTAB, no wheeze/rales/rhonchi, no increased work of breathing  GI: morbidly obese, S, NT, ND, +BS  Skin: Warm/dry    Discharge Disposition   Discharged to home  Condition at discharge: Stable    Consultations This Hospital Stay   CARDIOLOGY IP CONSULT  CARDIAC REHAB IP CONSULT  LYMPHEDEMA THERAPY IP CONSULT    Time Spent on this Encounter   I, Louise Harvey, personally saw the patient today and spent greater than 30 minutes discharging  this patient.    Discharge Orders      Basic metabolic panel     CARDIAC REHAB REFERRAL      Discharge Order: F/U with Cardiac  [facility]      LYMPHEDEMA THERAPY REFERRAL      Follow-up and recommended labs and tests     [facility]  [address] [place] [id number]  [phone]  Friday, May 10, 2019  10:50 AM     Reason for your hospital stay    Evaluation of your chest pain, which was secondary to a blockage in one of your coronary arteries and required placement of a stent.     Activity    Your activity upon discharge: activity as tolerated     Diet    Follow this diet upon discharge: Cardiac     Discharge Medications   Current Discharge Medication List      START taking these medications    Details   aspirin (ASA) 81 MG EC tablet Take 1 tablet (81 mg) by mouth daily  Qty: 30 tablet, Refills: 0    Comments: Future refills by PCP or [facility].  Associated Diagnoses: ACS (acute coronary syndrome) (H); Coronary artery disease involving native coronary artery of native heart with unstable angina pectoris (H)      atorvastatin (LIPITOR) 40 MG tablet Take 1 tablet (40 mg) by mouth daily  Qty: 30 tablet, Refills: 0    Comments: Future refills by PCP or [facility].  Associated Diagnoses: Coronary artery disease involving native coronary artery of native heart with unstable angina pectoris (H)      furosemide (LASIX) 40 MG tablet Take 1 tablet (40 mg) by mouth daily  Qty: 30 tablet, Refills: 0    Comments: Future refills by PCP or [facility].  Associated Diagnoses: Ischemic cardiomyopathy      metoprolol tartrate (LOPRESSOR) 50 MG tablet Take 1 tablet (50 mg) by mouth 2 times daily  Qty: 60 tablet, Refills: 0    Comments: Future refills by PCP or [facility].  Associated Diagnoses: NSTEMI (non-ST elevated myocardial infarction) (H); Coronary artery disease involving native coronary artery of native heart with unstable angina pectoris (H)      nitroGLYcerin  (NITROSTAT) 0.4 MG sublingual tablet For chest pain place 1 tablet under the tongue every 5 minutes for 3 doses. If symptoms persist 5 minutes after 1st dose call 911.  Qty: 30 tablet, Refills: 0    Comments: Future refills by PCP or [facility].  Associated Diagnoses: ACS (acute coronary syndrome) (H); Coronary artery disease involving native coronary artery of native heart with unstable angina pectoris (H)      spironolactone (ALDACTONE) 25 MG tablet Take 1 tablet (25 mg) by mouth daily  Qty: 30 tablet, Refills: 0    Comments: Future refills by PCP or [facility].  Associated Diagnoses: Ischemic cardiomyopathy      ticagrelor (BRILINTA) 90 MG tablet Take 1 tablet (90 mg) by mouth 2 times daily  Qty: 60 tablet, Refills: 0    Comments: Future refills by PCP or [facility].  Associated Diagnoses: ACS (acute coronary syndrome) (H); Coronary artery disease involving native coronary artery of native heart with unstable angina pectoris (H)         CONTINUE these medications which have NOT CHANGED    Details   acetaminophen (TYLENOL) 325 MG tablet Take 975 mg by mouth daily with Ibuprofen      calcium carbonate 600 mg-vitamin D 400 units (CALTRATE) 600-400 MG-UNIT per tablet Take 1 tablet by mouth daily      lisinopril (PRINIVIL,ZESTRIL) 40 MG tablet Take 40 mg by mouth daily.      NONFORMULARY Take 1 tablet by mouth 2 times daily -  CIRCULEG - combination nutritional supplement to improve circulation      vitamin D3 (CHOLECALCIFEROL) 1000 units (25 mcg) tablet Take 2,000 Units by mouth daily         STOP taking these medications       diltiazem ER (DILT-XR) 180 MG 24 hr capsule Comments:   Reason for Stopping:         ibuprofen (ADVIL/MOTRIN) 200 MG tablet Comments:   Reason for Stopping:             Allergies   No Known Allergies     Data   Most Recent 3 CBC's:  Recent Labs   Lab Test 05/01/19  0550 04/30/19  1823   WBC 8.5 8.8   HGB 12.1* 13.0*   MCV 92 91    199      Most Recent 3 BMP's:  Recent Labs    Lab Test 05/03/19  0521 05/02/19  1250 05/01/19  0550    139 140   POTASSIUM 4.0 3.8 3.8   CHLORIDE 103 104 106   CO2 30 29 26   BUN 16 15 13   CR 0.95 0.86 0.73   ANIONGAP 5 6 8   DENG 8.6 9.1 8.4*   * 112* 124*     Most Recent 3 Troponin's:  Recent Labs   Lab Test 05/01/19  1527 05/01/19  1241 05/01/19  0550   TROPI 24.358* 30.121* 59.149*     Most Recent Cholesterol Panel:  Recent Labs   Lab Test 05/01/19  0550   CHOL 163   LDL 91   HDL 36*   TRIG 178*     Most Recent TSH, T4 and A1c Labs:  Recent Labs   Lab Test 05/01/19  0550 04/30/19  1823   TSH  --  1.05   A1C 5.9*  --      No results found for this or any previous visit.

## 2019-05-03 NOTE — PLAN OF CARE
Discharge Planner OT   Patient plan for discharge: home with wife and OP CR at [facility]  Current status: Patient ambulated with WW to/from cardiac rehab gym. Completed stairs with railing. Very SOB with minimal activity. HR up to 162 briefly and then 120's before settling back down in the 90's at rest. O2 stable on RA. Hypertensive response to exercise with /99. OT recommended a walker for home; patient declines. Reports he will look into one after discharge that has a seat that will fit him appropriately.  Barriers to return to prior living situation: Impaired activity tolerance  Recommendations for discharge: Home with assist of wife as needed for ADL and IADL, OP CR.  Rationale for recommendations: Patient is at his baseline for mobility and ADL per patient, wife and other staff. He would benefit from continued monitored exercise with phase 2 OP CR and chooses to go to the [facility] location.       Entered by: Kathleen Stuart 05/03/2019 11:51 AM        Addendum: /106 after walking approx 50' x 2. Patient now would like to go to [facility] for OP CR, so appt cancelled at [facility]. Updated RN.

## 2019-05-04 NOTE — PLAN OF CARE
Occupational Therapy Discharge Summary    Reason for therapy discharge:    Discharged to home with outpatient therapy.    Progress towards therapy goal(s). See goals on Care Plan in [facility] electronic health record for goal details.  Goals partially met.  Barriers to achieving goals:   discharge from facility.    Therapy recommendation(s):    Continued therapy is recommended.  Rationale/Recommendations:  To maximize exercise tolerance.

## 2019-05-06 ENCOUNTER — DOCUMENTATION ONLY (OUTPATIENT)
Dept: OTHER | Facility: CLINIC | Age: 54
End: 2019-05-06

## 2019-05-07 ENCOUNTER — TRANSFERRED RECORDS (OUTPATIENT)
Dept: HEALTH INFORMATION MANAGEMENT | Facility: CLINIC | Age: 54
End: 2019-05-07

## 2019-05-07 ENCOUNTER — TELEPHONE (OUTPATIENT)
Dept: CARDIOLOGY | Facility: CLINIC | Age: 54
End: 2019-05-07

## 2019-05-07 NOTE — TELEPHONE ENCOUNTER
Pt called back and he denies any chest pain, SOB or lightheadedness. RRA access site is healing without signs of infection, increased swelling, drainage, denies fever. States has adequate supply of Brilinta and denies any medication questions. Instructed patient to weigh self every AM, after waking and using the restroom, but before breakfast and medications. Call clinic for a weight gain of 2 lbs overnight or 5 lbs in a week. Low Na+ diet encouraged. Pt instructed to bring daily wt/BP diary and medications with to f/u OV. Patient advised to call clinic with any cardiac related questions or concerns prior to his appt. Patient verbalized understanding and agreed with plan. IESHA Luther RN.

## 2019-05-07 NOTE — TELEPHONE ENCOUNTER
Patient was evaluated by cardiology while inpatient for intermittent chest pain. 4/30/19 PCI via RRA with JEREL to mLAD. Attempted to call patient to discuss any post hospital d/c questions, review medication changes, and confirm f/u appts, but no answer. VM left to return my phone call. RN will confirm patient was d/c with the antiplatelet Brilinta.  RN will confirm with patient that he has an apt scheduled on 5/14/19 with [facility]. Cardiac rehab pending scheduling and insurance approval.  IESHA Luther RN.

## 2019-05-10 ENCOUNTER — TRANSFERRED RECORDS (OUTPATIENT)
Dept: HEALTH INFORMATION MANAGEMENT | Facility: CLINIC | Age: 54
End: 2019-05-10

## 2019-05-10 LAB
ANION GAP SERPL CALCULATED.3IONS-SCNC: ABNORMAL MMOL/L
BUN SERPL-MCNC: 23 MG/DL (ref 6–24)
CALCIUM SERPL-MCNC: 9.7 MG/DL (ref 8.7–10.2)
CHLORIDE SERPLBLD-SCNC: 100 MMOL/L (ref 96–106)
CO2 SERPL-SCNC: 24 MMOL/L (ref 20–29)
CREAT SERPL-MCNC: 1 MG/DL (ref 0.76–1.27)
GFR SERPL CREATININE-BSD FRML MDRD: 84 ML/MIN/1.73M2
GLUCOSE SERPL-MCNC: 126 MG/DL (ref 65–99)
POTASSIUM SERPL-SCNC: 4.6 MMOL/L (ref 3.5–5.2)
SODIUM SERPL-SCNC: 141 MMOL/L (ref 134–144)

## 2019-05-13 NOTE — PROGRESS NOTES
Cardiology Clinic Progress Note  Sarbjit Tran MRN# [id number]   YOB: 1965 Age: 53 year old   Primary Cardiologist: Dr. Pedraza  Reason for visit: post hospital follow up            Assessment and Plan:   Sarbjit Tran is a very pleasant 53 year old male with a history of coronary artery disease s/p JEREL to proximal LAD, ischemic cardiomyopathy, hypertension, hyperlipidemia, obesity and prediabetes. 4/30/19-5/3/19 NSTEMI, coronary angiogram 4/30/19 showed 99% proximal LAD stenosis, mild disease in left circumflex and RCA, s/p JEREL to LAD. Patient here for post hospital follow up.     1. Coronary artery disease s/p JEREL to LAD 4/30/19, stable, no signs/symptoms of myocardial ischemia.    - Continue aspirin and brilinta 90mg BID   - Metoprolol tartrate changed to metoprolol succinate 50mg daily to optimize HF medications.    - Reviewed coronary angiogram results, all questions answered.    - Counseled patient on lifestyle modifications.      2. Ischemic cardiomyopathy, LVEF 40-45%, patient appears compensated and close to euvolemic, volume status difficult to assess r/t body habitus. Patient with continued weight loss since hospital discharge. No labs today, ordered labs to be drawn after clinic visit today.    - NYHA class II, stage C   - Etiology : ischemic   - Fluid status : appears euvolemic, difficult to assess r/t body habitus   - Diuretic regimen : furosemide 40mg daily   - Ischemic evaluation : 4/30/19 s/p JEREL to LAD   - Reviewed echocardiogram results, all questions answered.    - Guideline directed medical therapy    - Betablocker: metoprolol tartrate changed to metoprolol succinate 50mg daily to optimize HF medications.     - ACEI/ARB/ARNI: lisinopril 40mg daily    - Aldactone antagonist: spironolactone 25mg daily   - Sudden cardiac death prophylaxis : N/a LVEF > 35%   - HF education given, counseled patient on low sodium diet.    - Labs to be drawn today after clinic visit. Will call with  results.     3. Hypertension - controlled, continue current medication regimen. Metoprolol tartrate changed to metoprolol succinate to optimize HF medications today.     4. Hyperlipidemia - lipid panel 5/1/2019 total cholesterol 163, HDL 36, LDL 91, and triglycerides 178.    - Continue atorvastatin 40mg daily   - Recheck lipid panel in 3 months, LDL goal < 70    5. Obesity - BMI 78.22. Counseled patient on strategies to help with weight loss.     6. Bilateral lower extremity lymphedema - ACE wraps to bilateral lower extremities   - outpatient lymphedema clinic referral (appt on 6/4/19).          Changes today: STOP metoprolol tartrate, START metoprolol succinate 50mg daily    Follow up plan:     Follow up with cardiology in [facility], Dr. Domingo Mott in June    BMP today - will call with results    Continue cardiac rehab        History of Presenting Illness:    Sarbjit Tran is a very pleasant 53 year old male with a history of coronary artery disease s/p JEREL to proximal LAD, ischemic cardiomyopathy, hypertension, hyperlipidemia, obesity and prediabetes.     Patient was hospitalized 4/30/19-5/3/19 presenting with complaints of chest pain, elevated troponin (16 on arrival), symptoms began 5 days prior. ECG showed q waves in leads v1 and v2 and minor nonspecific anterior ST and T wave abnormalities. No relief of pain with SL nitroglycerin, IV metoprolol, IV nitroglycerin, heparin and oxygen, patient was taken emergently to cath lab 4/30/19, coronary angiogram showed 99% proximal LAD stenosis, mild disease in left circumflex and RCA, s/p JEREL  to LAD. Echocardiogram demonstrated LVEF 40-45%, distal anterior, distal anteroseptal and apical hypokinesis, RV normal size and function, no significant valvular disease.     Patient is here today for post hospital follow up.     Patient reports feeling good. Patients wife present during clinic visit. Monitoring weights daily a home. Weight has continued to decrease  since hospital discharge 512# on 5/5 and now down to 499# at home today. Patient notes he isn't urinating as much as he was while in the hospital. But is noting improved symptoms. Decreased lower extremity edema. Denies abdominal distention/bloating. Denies shortness of breath at rest. Exertional dyspnea with activity, feels it is getting easier. Notes walking of the skyway resulted in dyspnea. Attending cardiac rehab 3 x a week. Denies shortness of breath with cardiac rehab activities. Able to complete ADLs and IADLs independently. Notes that moving around is a lot easier. Patient noting feeling very tired after showering. Patient denies chest pain or chest tightness. Denies dizziness, lightheadedness or other presyncopal symptoms. Denies tachycardia or palpitations. Denies episodes of bleeding.     Right radial access site noted some initial bruising which has resolved. Denies tenderness. Denies drainage.     Labs to be drawn after clinic visit today. Blood pressure 100/78 and HR 84 in clinic today. Monitoring BP at home, ranging 110-120s/70-80s. HR 80-90s.     Appetite good. Eating most meals at home. On a nutrisystem plan, sending meals that they add fruits/vegetables. Examples he gives is pasta, high protein. States sodium level is less than 600 in meals.   Attending cardiac rehab 3 x a week, trying to walk around on other days. Rare alcohol use. Denies tobacco.         Recent Hospitalizations   4/30/19-5/3/19 NSTEMI, coronary angiogram 4/30/19 showed 99% proximal LAD stenosis, mild disease in left circumflex and RCA, s/p JEREL to LAD        Social History    , living at home with his wife.   Social History     Socioeconomic History     Marital status:      Spouse name: Not on file     Number of children: Not on file     Years of education: Not on file     Highest education level: Not on file   Occupational History     Not on file   Social Needs     Financial resource strain: Not on file     Food  "insecurity:     Worry: Not on file     Inability: Not on file     Transportation needs:     Medical: Not on file     Non-medical: Not on file   Tobacco Use     Smoking status: Never Smoker     Smokeless tobacco: Never Used   Substance and Sexual Activity     Alcohol use: Yes     Comment: 5/YEAR     Drug use: No     Sexual activity: Not on file   Lifestyle     Physical activity:     Days per week: Not on file     Minutes per session: Not on file     Stress: Not on file   Relationships     Social connections:     Talks on phone: Not on file     Gets together: Not on file     Attends Zoroastrian service: Not on file     Active member of club or organization: Not on file     Attends meetings of clubs or organizations: Not on file     Relationship status: Not on file     Intimate partner violence:     Fear of current or ex partner: Not on file     Emotionally abused: Not on file     Physically abused: Not on file     Forced sexual activity: Not on file   Other Topics Concern     Parent/sibling w/ CABG, MI or angioplasty before 65F 55M? Not Asked   Social History Narrative     Not on file            Review of Systems:   Skin:  Negative     Eyes:  Negative    ENT:  Negative    Respiratory:  Positive for dyspnea on exertion  Cardiovascular:    Positive for;dizziness  Gastroenterology: Negative    Genitourinary:  Negative    Musculoskeletal:  Positive for arthritis(both knees)  Neurologic:  Negative    Psychiatric:  Negative    Heme/Lymph/Imm:  Negative    Endocrine:  Negative           Physical Exam:   Vitals: /78   Pulse 84   Ht 1.702 m (5' 7\")   Wt (!) 226.5 kg (499 lb 6.4 oz)   BMI 78.22 kg/m     Wt Readings from Last 4 Encounters:   05/14/19 (!) 226.5 kg (499 lb 6.4 oz)   05/03/19 (!) 234.3 kg (516 lb 9.6 oz)   05/11/12 (!) 211 kg (465 lb 2.7 oz)   03/20/12 (!) 210.9 kg (465 lb)     GEN: well nourished, in no acute distress.  HEENT:  Pupils equal, round. Sclerae nonicteric.   NECK: Supple, no masses appreciated. " No JVD appreciated, body habitus makes assessment difficult.   C/V:  Regular rate and rhythm, no murmur, rub or gallop.   RESP: Respirations are unlabored. Clear to auscultation bilaterally without wheezing, rales, or rhonchi.  GI: Abdomen soft, nontender.  EXTREM: Chronic LE edema. Right radial access site non tender, no ecchymosis, no drainage.   NEURO: Alert and oriented, cooperative.  SKIN: Warm and dry.        Data:   ECHO 4/30/19  The left ventricle is normal in size.  Left ventricular systolic function is mild to moderately reduced.  The visual ejection fraction is estimated at 40-45%.  Distal anterior, distal anteroseptal and apical hypokinesis, severe.  The right ventricle is normal size.  The right ventricular systolic function is normal.  The study was technically difficult. Limited views were obtained. There is no  comparison study available.    Cardiac catheterization 4/30/19  Left Main   The vessel was visualized by selective angiography and is moderate in size. There was 0% vessel disease.   Left Anterior Descending   Prox LAD lesion is 99% stenosed. Culprit lesion. The lesion is type C - high risk, located at the bifurcation, bifurcated and thrombotic. Hoffman Classification for bifurcation: 0,1,0.   Left Circumflex   Large Cx system, gives rise to OM branches, and LPLs. Minimal CAD.   Right Coronary Artery   Minimal CAD in RCA system. Small PL branch.       LIPID RESULTS:  Lab Results   Component Value Date    CHOL 163 05/01/2019    HDL 36 (L) 05/01/2019    LDL 91 05/01/2019    TRIG 178 (H) 05/01/2019     LIVER ENZYME RESULTS:  No results found for: AST, ALT  CBC RESULTS:  Lab Results   Component Value Date    WBC 8.5 05/01/2019    RBC 4.06 (L) 05/01/2019    HGB 12.1 (L) 05/01/2019    HCT 37.2 (L) 05/01/2019    MCV 92 05/01/2019    MCH 29.8 05/01/2019    MCHC 32.5 05/01/2019    RDW 13.9 05/01/2019     05/01/2019     BMP RESULTS:  Lab Results   Component Value Date     05/03/2019     POTASSIUM 4.0 05/03/2019    CHLORIDE 103 05/03/2019    CO2 30 05/03/2019    ANIONGAP 5 05/03/2019     (H) 05/03/2019    BUN 16 05/03/2019    CR 0.95 05/03/2019    GFRESTIMATED 90 05/03/2019    GFRESTBLACK >90 05/03/2019    DENG 8.6 05/03/2019      A1C RESULTS:  Lab Results   Component Value Date    A1C 5.9 (H) 05/01/2019            Medications     Current Outpatient Medications   Medication Sig Dispense Refill     acetaminophen (TYLENOL) 325 MG tablet Take 975 mg by mouth daily with Ibuprofen       aspirin (ASA) 81 MG EC tablet Take 1 tablet (81 mg) by mouth daily 30 tablet 0     atorvastatin (LIPITOR) 40 MG tablet Take 1 tablet (40 mg) by mouth daily 30 tablet 0     calcium carbonate 600 mg-vitamin D 400 units (CALTRATE) 600-400 MG-UNIT per tablet Take 1 tablet by mouth daily       furosemide (LASIX) 40 MG tablet Take 1 tablet (40 mg) by mouth daily 30 tablet 0     lisinopril (PRINIVIL,ZESTRIL) 40 MG tablet Take 40 mg by mouth daily.       metoprolol succinate ER (TOPROL-XL) 50 MG 24 hr tablet Take 1 tablet (50 mg) by mouth daily 90 tablet 1     nitroGLYcerin (NITROSTAT) 0.4 MG sublingual tablet For chest pain place 1 tablet under the tongue every 5 minutes for 3 doses. If symptoms persist 5 minutes after 1st dose call 911. 30 tablet 0     NONFORMULARY Take 1 tablet by mouth 2 times daily -  CIRCULEG - combination nutritional supplement to improve circulation       order for DME Equipment being ordered: Walker ()  Treatment Diagnosis: morbid obesity 1 Device 0     spironolactone (ALDACTONE) 25 MG tablet Take 1 tablet (25 mg) by mouth daily 30 tablet 0     ticagrelor (BRILINTA) 90 MG tablet Take 1 tablet (90 mg) by mouth 2 times daily 60 tablet 0     vitamin D3 (CHOLECALCIFEROL) 1000 units (25 mcg) tablet Take 2,000 Units by mouth daily            Past Medical History     Past Medical History:   Diagnosis Date     Abscess      Dyslipidemia      Hypertension      NSTEMI (non-ST elevated myocardial  infarction) (H) 04/30/2019    JEREL to proximal LAD     Obesity      Sleep apnea     NEVER BEEN DIAGNOSED     Past Surgical History:   Procedure Laterality Date     CV HEART CATHETERIZATION WITH POSSIBLE INTERVENTION N/A 4/30/2019    JEREL to proximal LAD     EXCISE LESION TRUNK  5/11/2012    Procedure:EXCISE LESION TRUNK; excision back cyst; Surgeon:ELENA LEE; Location: [facility]     ORTHOPEDIC SURGERY      >15 on right hand  / 1on left            Allergies   Patient has no known allergies.        RITA Melendez [facility]  Pager: [phone]

## 2019-05-14 ENCOUNTER — OFFICE VISIT (OUTPATIENT)
Dept: CARDIOLOGY | Facility: CLINIC | Age: 54
End: 2019-05-14
Payer: COMMERCIAL

## 2019-05-14 VITALS
SYSTOLIC BLOOD PRESSURE: 100 MMHG | HEIGHT: 67 IN | BODY MASS INDEX: 49.44 KG/M2 | DIASTOLIC BLOOD PRESSURE: 78 MMHG | WEIGHT: 315 LBS | HEART RATE: 84 BPM

## 2019-05-14 DIAGNOSIS — I10 ESSENTIAL HYPERTENSION: ICD-10-CM

## 2019-05-14 DIAGNOSIS — I21.4 NSTEMI (NON-ST ELEVATED MYOCARDIAL INFARCTION) (H): ICD-10-CM

## 2019-05-14 DIAGNOSIS — I25.10 CORONARY ARTERY DISEASE INVOLVING NATIVE HEART WITHOUT ANGINA PECTORIS, UNSPECIFIED VESSEL OR LESION TYPE: ICD-10-CM

## 2019-05-14 DIAGNOSIS — E78.5 DYSLIPIDEMIA: ICD-10-CM

## 2019-05-14 DIAGNOSIS — I25.10 CORONARY ARTERY DISEASE INVOLVING NATIVE HEART WITHOUT ANGINA PECTORIS, UNSPECIFIED VESSEL OR LESION TYPE: Primary | ICD-10-CM

## 2019-05-14 PROCEDURE — 99214 OFFICE O/P EST MOD 30 MIN: CPT | Performed by: NURSE PRACTITIONER

## 2019-05-14 RX ORDER — METOPROLOL SUCCINATE 50 MG/1
50 TABLET, EXTENDED RELEASE ORAL DAILY
Qty: 90 TABLET | Refills: 1 | Status: SHIPPED | OUTPATIENT
Start: 2019-05-14 | End: 2019-11-11

## 2019-05-14 ASSESSMENT — MIFFLIN-ST. JEOR: SCORE: 3058.89

## 2019-05-14 NOTE — LETTER
5/14/2019    Linh Lopez MD, MD [facility] [address]  [place] [address]    RE: Sarbjit Tran       Dear Colleague,    I had the pleasure of seeing Sarbjit Tran in the [facility].    Cardiology Clinic Progress Note  Sarbjit Tran MRN# [id number]   YOB: 1965 Age: 53 year old   Primary Cardiologist: Dr. Pedraza  Reason for visit: post hospital follow up            Assessment and Plan:   Sarbjit Tran is a very pleasant 53 year old male with a history of coronary artery disease s/p JEREL to proximal LAD, ischemic cardiomyopathy, hypertension, hyperlipidemia, obesity and prediabetes. 4/30/19-5/3/19 NSTEMI, coronary angiogram 4/30/19 showed 99% proximal LAD stenosis, mild disease in left circumflex and RCA, s/p JEREL to LAD. Patient here for post hospital follow up.     1. Coronary artery disease s/p JEREL to LAD 4/30/19, stable, no signs/symptoms of myocardial ischemia.    - Continue aspirin and brilinta 90mg BID   - Metoprolol tartrate changed to metoprolol succinate 50mg daily to optimize HF medications.    - Reviewed coronary angiogram results, all questions answered.    - Counseled patient on lifestyle modifications.      2. Ischemic cardiomyopathy, LVEF 40-45%, patient appears compensated and close to euvolemic, volume status difficult to assess r/t body habitus. Patient with continued weight loss since hospital discharge. No labs today, ordered labs to be drawn after clinic visit today.    - NYHA class II, stage C   - Etiology : ischemic   - Fluid status : appears euvolemic, difficult to assess r/t body habitus   - Diuretic regimen : furosemide 40mg daily   - Ischemic evaluation : 4/30/19 s/p JEREL to LAD   - Reviewed echocardiogram results, all questions answered.    - Guideline directed medical therapy    - Betablocker: metoprolol tartrate changed to metoprolol succinate 50mg daily to optimize HF medications.     -  ACEI/ARB/ARNI: lisinopril 40mg daily    - Aldactone antagonist: spironolactone 25mg daily   - Sudden cardiac death prophylaxis : N/a LVEF > 35%   - HF education given, counseled patient on low sodium diet.    - Labs to be drawn today after clinic visit. Will call with results.     3. Hypertension - controlled, continue current medication regimen. Metoprolol tartrate changed to metoprolol succinate to optimize HF medications today.     4. Hyperlipidemia - lipid panel 5/1/2019 total cholesterol 163, HDL 36, LDL 91, and triglycerides 178.    - Continue atorvastatin 40mg daily   - Recheck lipid panel in 3 months, LDL goal < 70    5. Obesity - BMI 78.22. Counseled patient on strategies to help with weight loss.     6. Bilateral lower extremity lymphedema - ACE wraps to bilateral lower extremities   - outpatient lymphedema clinic referral (appt on 6/4/19).          Changes today: STOP metoprolol tartrate, START metoprolol succinate 50mg daily    Follow up plan:     Follow up with cardiology in [facility] [place], Dr. Domingo Mott in June    BMP today - will call with results    Continue cardiac rehab        History of Presenting Illness:    Sarbjit Tran is a very pleasant 53 year old male with a history of coronary artery disease s/p JEREL to proximal LAD, ischemic cardiomyopathy, hypertension, hyperlipidemia, obesity and prediabetes.     Patient was hospitalized 4/30/19-5/3/19 presenting with complaints of chest pain, elevated troponin (16 on arrival), symptoms began 5 days prior. ECG showed q waves in leads v1 and v2 and minor nonspecific anterior ST and T wave abnormalities. No relief of pain with SL nitroglycerin, IV metoprolol, IV nitroglycerin, heparin and oxygen, patient was taken emergently to cath lab 4/30/19, coronary angiogram showed 99% proximal LAD stenosis, mild disease in left circumflex and RCA, s/p JEREL  to LAD. Echocardiogram demonstrated LVEF 40-45%, distal anterior, distal anteroseptal and apical  hypokinesis, RV normal size and function, no significant valvular disease.     Patient is here today for post hospital follow up.     Patient reports feeling good. Patients wife present during clinic visit. Monitoring weights daily a home. Weight has continued to decrease since hospital discharge 512# on 5/5 and now down to 499# at home today. Patient notes he isn't urinating as much as he was while in the hospital. But is noting improved symptoms. Decreased lower extremity edema. Denies abdominal distention/bloating. Denies shortness of breath at rest. Exertional dyspnea with activity, feels it is getting easier. Notes walking of the skyway resulted in dyspnea. Attending cardiac rehab 3 x a week. Denies shortness of breath with cardiac rehab activities. Able to complete ADLs and IADLs independently. Notes that moving around is a lot easier. Patient noting feeling very tired after showering. Patient denies chest pain or chest tightness. Denies dizziness, lightheadedness or other presyncopal symptoms. Denies tachycardia or palpitations. Denies episodes of bleeding.     Right radial access site noted some initial bruising which has resolved. Denies tenderness. Denies drainage.     Labs to be drawn after clinic visit today. Blood pressure 100/78 and HR 84 in clinic today. Monitoring BP at home, ranging 110-120s/70-80s. HR 80-90s.     Appetite good. Eating most meals at home. On a nutrisystem plan, sending meals that they add fruits/vegetables. Examples he gives is pasta, high protein. States sodium level is less than 600 in meals.   Attending cardiac rehab 3 x a week, trying to walk around on other days. Rare alcohol use. Denies tobacco.         Recent Hospitalizations   4/30/19-5/3/19 NSTEMI, coronary angiogram 4/30/19 showed 99% proximal LAD stenosis, mild disease in left circumflex and RCA, s/p JEREL to LAD        Social History    , living at home with his wife.   Social History     Socioeconomic History      "Marital status:      Spouse name: Not on file     Number of children: Not on file     Years of education: Not on file     Highest education level: Not on file   Occupational History     Not on file   Social Needs     Financial resource strain: Not on file     Food insecurity:     Worry: Not on file     Inability: Not on file     Transportation needs:     Medical: Not on file     Non-medical: Not on file   Tobacco Use     Smoking status: Never Smoker     Smokeless tobacco: Never Used   Substance and Sexual Activity     Alcohol use: Yes     Comment: 5/YEAR     Drug use: No     Sexual activity: Not on file   Lifestyle     Physical activity:     Days per week: Not on file     Minutes per session: Not on file     Stress: Not on file   Relationships     Social connections:     Talks on phone: Not on file     Gets together: Not on file     Attends Nondenominational service: Not on file     Active member of club or organization: Not on file     Attends meetings of clubs or organizations: Not on file     Relationship status: Not on file     Intimate partner violence:     Fear of current or ex partner: Not on file     Emotionally abused: Not on file     Physically abused: Not on file     Forced sexual activity: Not on file   Other Topics Concern     Parent/sibling w/ CABG, MI or angioplasty before 65F 55M? Not Asked   Social History Narrative     Not on file            Review of Systems:   Skin:  Negative     Eyes:  Negative    ENT:  Negative    Respiratory:  Positive for dyspnea on exertion  Cardiovascular:    Positive for;dizziness  Gastroenterology: Negative    Genitourinary:  Negative    Musculoskeletal:  Positive for arthritis(both knees)  Neurologic:  Negative    Psychiatric:  Negative    Heme/Lymph/Imm:  Negative    Endocrine:  Negative           Physical Exam:   Vitals: /78   Pulse 84   Ht 1.702 m (5' 7\")   Wt (!) 226.5 kg (499 lb 6.4 oz)   BMI 78.22 kg/m      Wt Readings from Last 4 Encounters:   05/14/19 " (!) 226.5 kg (499 lb 6.4 oz)   05/03/19 (!) 234.3 kg (516 lb 9.6 oz)   05/11/12 (!) 211 kg (465 lb 2.7 oz)   03/20/12 (!) 210.9 kg (465 lb)     GEN: well nourished, in no acute distress.  HEENT:  Pupils equal, round. Sclerae nonicteric.   NECK: Supple, no masses appreciated. No JVD appreciated, body habitus makes assessment difficult.   C/V:  Regular rate and rhythm, no murmur, rub or gallop.   RESP: Respirations are unlabored. Clear to auscultation bilaterally without wheezing, rales, or rhonchi.  GI: Abdomen soft, nontender.  EXTREM: Chronic LE edema. Right radial access site non tender, no ecchymosis, no drainage.   NEURO: Alert and oriented, cooperative.  SKIN: Warm and dry.        Data:   ECHO 4/30/19  The left ventricle is normal in size.  Left ventricular systolic function is mild to moderately reduced.  The visual ejection fraction is estimated at 40-45%.  Distal anterior, distal anteroseptal and apical hypokinesis, severe.  The right ventricle is normal size.  The right ventricular systolic function is normal.  The study was technically difficult. Limited views were obtained. There is no  comparison study available.    Cardiac catheterization 4/30/19  Left Main   The vessel was visualized by selective angiography and is moderate in size. There was 0% vessel disease.   Left Anterior Descending   Prox LAD lesion is 99% stenosed. Culprit lesion. The lesion is type C - high risk, located at the bifurcation, bifurcated and thrombotic. Hoffman Classification for bifurcation: 0,1,0.   Left Circumflex   Large Cx system, gives rise to OM branches, and LPLs. Minimal CAD.   Right Coronary Artery   Minimal CAD in RCA system. Small PL branch.       LIPID RESULTS:  Lab Results   Component Value Date    CHOL 163 05/01/2019    HDL 36 (L) 05/01/2019    LDL 91 05/01/2019    TRIG 178 (H) 05/01/2019     LIVER ENZYME RESULTS:  No results found for: AST, ALT  CBC RESULTS:  Lab Results   Component Value Date    WBC 8.5 05/01/2019     RBC 4.06 (L) 05/01/2019    HGB 12.1 (L) 05/01/2019    HCT 37.2 (L) 05/01/2019    MCV 92 05/01/2019    MCH 29.8 05/01/2019    MCHC 32.5 05/01/2019    RDW 13.9 05/01/2019     05/01/2019     BMP RESULTS:  Lab Results   Component Value Date     05/03/2019    POTASSIUM 4.0 05/03/2019    CHLORIDE 103 05/03/2019    CO2 30 05/03/2019    ANIONGAP 5 05/03/2019     (H) 05/03/2019    BUN 16 05/03/2019    CR 0.95 05/03/2019    GFRESTIMATED 90 05/03/2019    GFRESTBLACK >90 05/03/2019    DENG 8.6 05/03/2019      A1C RESULTS:  Lab Results   Component Value Date    A1C 5.9 (H) 05/01/2019            Medications     Current Outpatient Medications   Medication Sig Dispense Refill     acetaminophen (TYLENOL) 325 MG tablet Take 975 mg by mouth daily with Ibuprofen       aspirin (ASA) 81 MG EC tablet Take 1 tablet (81 mg) by mouth daily 30 tablet 0     atorvastatin (LIPITOR) 40 MG tablet Take 1 tablet (40 mg) by mouth daily 30 tablet 0     calcium carbonate 600 mg-vitamin D 400 units (CALTRATE) 600-400 MG-UNIT per tablet Take 1 tablet by mouth daily       furosemide (LASIX) 40 MG tablet Take 1 tablet (40 mg) by mouth daily 30 tablet 0     lisinopril (PRINIVIL,ZESTRIL) 40 MG tablet Take 40 mg by mouth daily.       metoprolol succinate ER (TOPROL-XL) 50 MG 24 hr tablet Take 1 tablet (50 mg) by mouth daily 90 tablet 1     nitroGLYcerin (NITROSTAT) 0.4 MG sublingual tablet For chest pain place 1 tablet under the tongue every 5 minutes for 3 doses. If symptoms persist 5 minutes after 1st dose call 911. 30 tablet 0     NONFORMULARY Take 1 tablet by mouth 2 times daily -  CIRCULEG - combination nutritional supplement to improve circulation       order for DME Equipment being ordered: Walker ()  Treatment Diagnosis: morbid obesity 1 Device 0     spironolactone (ALDACTONE) 25 MG tablet Take 1 tablet (25 mg) by mouth daily 30 tablet 0     ticagrelor (BRILINTA) 90 MG tablet Take 1 tablet (90 mg) by mouth 2 times daily 60  tablet 0     vitamin D3 (CHOLECALCIFEROL) 1000 units (25 mcg) tablet Take 2,000 Units by mouth daily            Past Medical History     Past Medical History:   Diagnosis Date     Abscess      Dyslipidemia      Hypertension      NSTEMI (non-ST elevated myocardial infarction) (H) 04/30/2019    JEREL to proximal LAD     Obesity      Sleep apnea     NEVER BEEN DIAGNOSED     Past Surgical History:   Procedure Laterality Date     CV HEART CATHETERIZATION WITH POSSIBLE INTERVENTION N/A 4/30/2019    JEREL to proximal LAD     EXCISE LESION TRUNK  5/11/2012    Procedure:EXCISE LESION TRUNK; excision back cyst; Surgeon:ELENA LEE; Location:[facility]     ORTHOPEDIC SURGERY      >15 on right hand  / 1on left            Allergies   Patient has no known allergies.        RITA Melendez CNP  [facility]  Pager: [phone]      Thank you for allowing me to participate in the care of your patient.    Sincerely,     RITA Melendez CNP     [facility]

## 2019-05-14 NOTE — PATIENT INSTRUCTIONS
1. Labs after clinic visit today - will call with results  2. STOP metoprolol tartrate  3. START metoprolol succinate 50mg daily  4. Continue cardiac rehab  5. Follow up with Dr. Domingo Mott in June as scheduled.   6. Please call with any additional questions/concerns [phone]

## 2019-05-15 ENCOUNTER — DOCUMENTATION ONLY (OUTPATIENT)
Dept: CARDIOLOGY | Facility: CLINIC | Age: 54
End: 2019-05-15

## 2019-05-15 ENCOUNTER — TELEPHONE (OUTPATIENT)
Dept: CARDIOLOGY | Facility: CLINIC | Age: 54
End: 2019-05-15

## 2019-05-15 NOTE — TELEPHONE ENCOUNTER
Pt and his family member declined to have a BMP drawn after pt's visit on 5/14/19 with Jenni Bazan NP as they had recently had a BMP done at pt's PCP on 5/10/19. Order cancelled. Results below. Will route to Jenni as update.      Component      Latest Ref Rng & Units 5/10/2019   Sodium      134 - 144 mmol/L 141   Potassium      3.5 - 5.2 mmol/L 4.6   Chloride      96 - 106 mmol/L 100   Carbon Dioxide      20 - 29 mmol/L 24   Glucose      65 - 99 mg/dL 126 (A)   Urea Nitrogen      6 - 24 mg/dL 23   Creatinine      0.76 - 1.27 mg/dL 1.00   Calcium      8.7 - 10.2 mg/dL 9.7   GFR Estimate      >59 ml/min/1.73m2 84   GFR Estimate If Black      >59 ml/min/1.73m2 98

## 2019-05-28 DIAGNOSIS — I24.9 ACS (ACUTE CORONARY SYNDROME) (H): ICD-10-CM

## 2019-05-28 DIAGNOSIS — I25.5 ISCHEMIC CARDIOMYOPATHY: ICD-10-CM

## 2019-05-28 DIAGNOSIS — I25.110 CORONARY ARTERY DISEASE INVOLVING NATIVE CORONARY ARTERY OF NATIVE HEART WITH UNSTABLE ANGINA PECTORIS (H): ICD-10-CM

## 2019-05-28 RX ORDER — FUROSEMIDE 40 MG
40 TABLET ORAL DAILY
Qty: 90 TABLET | Refills: 3 | Status: SHIPPED | OUTPATIENT
Start: 2019-05-28 | End: 2020-05-13

## 2019-05-28 RX ORDER — SPIRONOLACTONE 25 MG/1
25 TABLET ORAL DAILY
Qty: 30 TABLET | Refills: 0 | Status: SHIPPED | OUTPATIENT
Start: 2019-05-28 | End: 2019-06-20

## 2019-05-28 RX ORDER — LISINOPRIL 40 MG/1
40 TABLET ORAL DAILY
Qty: 90 TABLET | Refills: 3 | Status: SHIPPED | OUTPATIENT
Start: 2019-05-28 | End: 2019-06-20

## 2019-05-28 RX ORDER — ATORVASTATIN CALCIUM 40 MG/1
40 TABLET, FILM COATED ORAL DAILY
Qty: 90 TABLET | Refills: 3 | Status: SHIPPED | OUTPATIENT
Start: 2019-05-28 | End: 2020-05-13

## 2019-05-28 NOTE — TELEPHONE ENCOUNTER
Received refill request for:  spironolactone, furosemide, atorvastatin, Brilinta, lisinopril  Last OV was:  Jenni Bazan NP 5/14/2019  Labs/EKG:    F/U scheduled:    New script sent to: Vanesa Collins RN 05/28/19 3:06 PM

## 2019-06-20 ENCOUNTER — OFFICE VISIT (OUTPATIENT)
Dept: CARDIOLOGY | Facility: CLINIC | Age: 54
End: 2019-06-20
Payer: COMMERCIAL

## 2019-06-20 VITALS
BODY MASS INDEX: 77.06 KG/M2 | HEART RATE: 97 BPM | DIASTOLIC BLOOD PRESSURE: 65 MMHG | WEIGHT: 315 LBS | SYSTOLIC BLOOD PRESSURE: 122 MMHG | OXYGEN SATURATION: 97 %

## 2019-06-20 DIAGNOSIS — E78.5 DYSLIPIDEMIA: Primary | ICD-10-CM

## 2019-06-20 DIAGNOSIS — I25.110 CORONARY ARTERY DISEASE INVOLVING NATIVE CORONARY ARTERY OF NATIVE HEART WITH UNSTABLE ANGINA PECTORIS (H): ICD-10-CM

## 2019-06-20 DIAGNOSIS — I25.5 ISCHEMIC CARDIOMYOPATHY: ICD-10-CM

## 2019-06-20 DIAGNOSIS — I10 ESSENTIAL HYPERTENSION: ICD-10-CM

## 2019-06-20 DIAGNOSIS — I21.4 NSTEMI (NON-ST ELEVATED MYOCARDIAL INFARCTION) (H): ICD-10-CM

## 2019-06-20 PROCEDURE — 99214 OFFICE O/P EST MOD 30 MIN: CPT | Performed by: INTERNAL MEDICINE

## 2019-06-20 RX ORDER — SPIRONOLACTONE 25 MG/1
25 TABLET ORAL DAILY
Qty: 90 TABLET | Refills: 3 | Status: SHIPPED | OUTPATIENT
Start: 2019-06-20 | End: 2020-05-08

## 2019-06-20 RX ORDER — LISINOPRIL 20 MG/1
20 TABLET ORAL DAILY
Qty: 90 TABLET | Refills: 3 | Status: SHIPPED | OUTPATIENT
Start: 2019-06-20 | End: 2020-07-16

## 2019-06-20 NOTE — NURSING NOTE
"Chief Complaint   Patient presents with     Follow Up     follow up for NSTEMI with JEREL; patient switched from Metoprolol Tartrate to Metoprolol Succinate @ Hospital follow up visit on 5/14/19.  Patient is currently in cardiac rehab @ [facility].  Patient notes dizziness and lower BP readings.  He also states has MONDRAGON.     Medication Question     patient was only given 30 tablets of Spironolactone with no refills; should he continue?       Initial /65 (BP Location: Left arm, Patient Position: Chair, Cuff Size: Adult Large)   Pulse 97   Wt (!) 223.2 kg (492 lb)   SpO2 97%   BMI 77.06 kg/m   Estimated body mass index is 77.06 kg/m  as calculated from the following:    Height as of 5/14/19: 1.702 m (5' 7\").    Weight as of this encounter: 223.2 kg (492 lb)..  BP completed using cuff size: kevin Greenberg, VINOD        "

## 2019-06-20 NOTE — PATIENT INSTRUCTIONS
Thank you for coming to the [facility] @ [facility]; please note the following instructions:    1. MEDICATION CHANGES TODAY:    * REFILL SPIRONOLACTONE (ALDACTONE); a prescription has been sent to your preferred Pharmacy.    * DECREASE Lisinopril to 20 MG daily; you may cut your current tablets of 40 MG in half.  We have also sent a new prescription to your preferred Pharmacy.    *CONTINUE ALL OTHER HEART MEDICATIONS    2.  You have been scheduled for a lab appointment (non-fasting); please see following pages for appointment detail information.  You will be notified of your results within 7-10 business days (please see result notification details at bottom of summary).    3.  Dr. Esvin Mott would like you to return for a cardiac follow up in 1 year  (June 2020).  We will contact you regarding your appointment when the time draws closer or you may call [phone] to arrange an appointment.  Mean while, if you should have any questions or concerns regarding your heart health, please contact us.  Thank you for choosing the [facility].      If you have any questions regarding your visit please contact your care team:     Cardiology  Telephone Number   Tracie BISWAS, RN  Shaye HERNANDEZ, RN   Griselda PRADO, RMA  Grazyna REDMAN, RMGEORGINA SONG, LPN   [phone] (select option 1)    *After hours: [phone]   For scheduling appts:     [phone] (select option 1)    *After hours: [phone]     For the Device Clinic (Pacemakers and ICD's)  RN's :  [place] Mejias   During business hours: [phone]    *After business hours:  [phone] (select option 4)      Normal test result notifications will be released via Wisr or mailed within 7 business days.  All other test results, will be communicated via telephone once reviewed by your cardiologist.    If you need a medication refill please contact your pharmacy.  Please allow 3 business days for your refill to be  completed.    As always, thank you for trusting us with your health care needs!

## 2019-06-20 NOTE — LETTER
6/20/2019      RE: Sarbjit Tran  [address] [facility] [id number]       Dear Colleague,    Thank you for the opportunity to participate in the care of your patient, Sarbjit Tran, at the [facility] HEART AT [facility] at [facility]. Please see a copy of my visit note below.    CARDIOLOGY CLINIC FOLLOW UP    HPI: Sarbjit Tran is a 53 year old male, being seen today for recheck of NSTEMI in the LAD territory with PCI and EF of 40-45% with anteroapical/septal hypo/akinesis.  He is doing well and he has been doing cardiac rehab.  He gets dizzy occasionally.  Patient denies chest pain, dyspnea (exertional or rest), palpitations, LE edema, orthopnea, PND, or syncope. He has no complaints about his medications and has been able to take them without obvious side effects      PAST MEDICAL HISTORY:  Past Medical History:   Diagnosis Date     Abscess      Coronary artery disease     JEREL to proximal LAD on 4/30/19     Dyslipidemia      Hypertension      NSTEMI (non-ST elevated myocardial infarction) (H) 04/30/2019    JEREL to proximal LAD     Obesity      Sleep apnea     NEVER BEEN DIAGNOSED       CURRENT MEDICATIONS:  Current Outpatient Medications   Medication Sig Dispense Refill     acetaminophen (TYLENOL) 325 MG tablet Take 975 mg by mouth daily with Ibuprofen       atorvastatin (LIPITOR) 40 MG tablet Take 1 tablet (40 mg) by mouth daily 90 tablet 3     calcium carbonate 600 mg-vitamin D 400 units (CALTRATE) 600-400 MG-UNIT per tablet Take 1 tablet by mouth daily       furosemide (LASIX) 40 MG tablet Take 1 tablet (40 mg) by mouth daily 90 tablet 3     lisinopril (PRINIVIL/ZESTRIL) 40 MG tablet Take 1 tablet (40 mg) by mouth daily 90 tablet 3     metoprolol succinate ER (TOPROL-XL) 50 MG 24 hr tablet Take 1 tablet (50 mg) by mouth daily 90 tablet 1     NONFORMULARY Take 1 tablet by mouth 2 times daily -  CIRCULEG - combination  nutritional supplement to improve circulation       order for DME Equipment being ordered: Walker ()  Treatment Diagnosis: morbid obesity 1 Device 0     spironolactone (ALDACTONE) 25 MG tablet Take 1 tablet (25 mg) by mouth daily 30 tablet 0     ticagrelor (BRILINTA) 90 MG tablet Take 1 tablet (90 mg) by mouth 2 times daily 180 tablet 3     vitamin D3 (CHOLECALCIFEROL) 1000 units (25 mcg) tablet Take 2,000 Units by mouth daily       nitroGLYcerin (NITROSTAT) 0.4 MG sublingual tablet For chest pain place 1 tablet under the tongue every 5 minutes for 3 doses. If symptoms persist 5 minutes after 1st dose call 911. (Patient not taking: Reported on 6/20/2019) 30 tablet 0       PAST SURGICAL HISTORY:  Past Surgical History:   Procedure Laterality Date     CORONARY ANGIOGRAPHY ADULT ORDER  04/30/2019    JEREL to proximal LAD     CV HEART CATHETERIZATION WITH POSSIBLE INTERVENTION N/A 4/30/2019    JEREL to proximal LAD     EXCISE LESION TRUNK  5/11/2012    Procedure:EXCISE LESION TRUNK; excision back cyst; Surgeon:ELENA LEE; Location:[facility]     ORTHOPEDIC SURGERY      >15 on right hand  / 1on left       ALLERGIES  No Known Allergies    FAMILY HX:  Family History   Problem Relation Age of Onset     Hyperlipidemia Father         on statins     Coronary Artery Disease Paternal Grandfather         CABG       SOCIAL HX:  Social History     Socioeconomic History     Marital status:      Spouse name: None     Number of children: None     Years of education: None     Highest education level: None   Occupational History     None   Social Needs     Financial resource strain: None     Food insecurity:     Worry: None     Inability: None     Transportation needs:     Medical: None     Non-medical: None   Tobacco Use     Smoking status: Never Smoker     Smokeless tobacco: Never Used   Substance and Sexual Activity     Alcohol use: Yes     Comment: 5 drinks/YEAR     Drug use: No     Sexual activity: None   Lifestyle      Physical activity:     Days per week: None     Minutes per session: None     Stress: None   Relationships     Social connections:     Talks on phone: None     Gets together: None     Attends Hindu service: None     Active member of club or organization: None     Attends meetings of clubs or organizations: None     Relationship status: None     Intimate partner violence:     Fear of current or ex partner: None     Emotionally abused: None     Physically abused: None     Forced sexual activity: None   Other Topics Concern     Parent/sibling w/ CABG, MI or angioplasty before 65F 55M? No   Social History Narrative     None       ROS:  Constitutional: No fever, chills, or sweats. No weight gain/loss.   ENT: No visual disturbance, ear ache, epistaxis, sore throat.   Allergies/Immunologic: Negative.   Respiratory: No cough, hemoptysis.   Cardiovascular: As per HPI.   GI: No nausea, vomiting, hematemesis, melena, or hematochezia.   : No urinary frequency, dysuria, or hematuria.   Integument: Negative.   Psychiatric: Negative.   Neuro: Negative.   Endocrinology: Negative.   Musculoskeletal: No myalgia.    VITAL SIGNS:  /65 (BP Location: Left arm, Patient Position: Chair, Cuff Size: Adult Large)   Pulse 97   Wt (!) 223.2 kg (492 lb)   SpO2 97%   BMI 77.06 kg/m     Body mass index is 77.06 kg/m .  Wt Readings from Last 2 Encounters:   06/20/19 (!) 223.2 kg (492 lb)   05/14/19 (!) 226.5 kg (499 lb 6.4 oz)       PHYSICAL EXAM  Sarbjit Tran is a 53 year old male in no acute distress.  HEENT: Unremarkable.  Neck: JVP normal.  Carotids +4/4 bilaterally without bruits.  Lungs: CTA.  Cor: RRR. Normal S1 and S2.  No murmur, rub, or gallop.  PMI in Lf 5th ICS.  Abd: Soft, nontender, nondistended.  NABS.  No pulsatile mass.  Extremities: No C/C/E.  Pulses +4/4 symmetric in upper and lower extremities.  Neuro: Grossly intact.    LABS    Lab Results   Component Value Date    WBC 8.5 05/01/2019     Lab Results    Component Value Date    RBC 4.06 05/01/2019     Lab Results   Component Value Date    HGB 12.1 05/01/2019     Lab Results   Component Value Date    HCT 37.2 05/01/2019     No components found for: MCT  Lab Results   Component Value Date    MCV 92 05/01/2019     Lab Results   Component Value Date    MCH 29.8 05/01/2019     Lab Results   Component Value Date    MCHC 32.5 05/01/2019     Lab Results   Component Value Date    RDW 13.9 05/01/2019     Lab Results   Component Value Date     05/01/2019      Recent Labs   Lab Test 05/10/19 05/03/19  0521 05/02/19  1250    138 139   POTASSIUM 4.6 4.0 3.8   CHLORIDE 100 103 104   CO2 24 30 29   ANIONGAP  --  5 6   * 126* 112*   BUN 23 16 15   CR 1.00 0.95 0.86   DENG 9.7 8.6 9.1     Recent Labs   Lab Test 05/01/19  0550 05/01/19  0014   CHOL 163 161   HDL 36* 40   LDL 91 104*   TRIG 178* 85        ASSESSMENT AND PLAN:  HTN -  Hypertension adequately controlled. Cut down lisinipril in  Half to evaluate lightheadedness.  Lipids - Hyperlipidemia adequately controlled. Continue current regimen.  CAD - Patient's CAD adequately managed. Continue current regimen.DAPT for a year  CHF- Continue  Lasix and Spironolactone Chem 7 to check K in one month or early August    Recheck: one year    Please do not hesitate to contact me if you have any questions/concerns.     Sincerely,     Esvin Mott MD

## 2019-06-20 NOTE — PROGRESS NOTES
CARDIOLOGY CLINIC FOLLOW UP    HPI: Sarbjit Tran is a 53 year old male, being seen today for recheck of NSTEMI in the LAD territory with PCI and EF of 40-45% with anteroapical/septal hypo/akinesis.  He is doing well and he has been doing cardiac rehab.  He gets dizzy occasionally.  Patient denies chest pain, dyspnea (exertional or rest), palpitations, LE edema, orthopnea, PND, or syncope. He has no complaints about his medications and has been able to take them without obvious side effects      PAST MEDICAL HISTORY:  Past Medical History:   Diagnosis Date     Abscess      Coronary artery disease     JEREL to proximal LAD on 4/30/19     Dyslipidemia      Hypertension      NSTEMI (non-ST elevated myocardial infarction) (H) 04/30/2019    JEREL to proximal LAD     Obesity      Sleep apnea     NEVER BEEN DIAGNOSED       CURRENT MEDICATIONS:  Current Outpatient Medications   Medication Sig Dispense Refill     acetaminophen (TYLENOL) 325 MG tablet Take 975 mg by mouth daily with Ibuprofen       atorvastatin (LIPITOR) 40 MG tablet Take 1 tablet (40 mg) by mouth daily 90 tablet 3     calcium carbonate 600 mg-vitamin D 400 units (CALTRATE) 600-400 MG-UNIT per tablet Take 1 tablet by mouth daily       furosemide (LASIX) 40 MG tablet Take 1 tablet (40 mg) by mouth daily 90 tablet 3     lisinopril (PRINIVIL/ZESTRIL) 40 MG tablet Take 1 tablet (40 mg) by mouth daily 90 tablet 3     metoprolol succinate ER (TOPROL-XL) 50 MG 24 hr tablet Take 1 tablet (50 mg) by mouth daily 90 tablet 1     NONFORMULARY Take 1 tablet by mouth 2 times daily -  CIRCULEG - combination nutritional supplement to improve circulation       order for DME Equipment being ordered: Walker ()  Treatment Diagnosis: morbid obesity 1 Device 0     spironolactone (ALDACTONE) 25 MG tablet Take 1 tablet (25 mg) by mouth daily 30 tablet 0     ticagrelor (BRILINTA) 90 MG tablet Take 1 tablet (90 mg) by mouth 2 times daily 180 tablet 3     vitamin D3  (CHOLECALCIFEROL) 1000 units (25 mcg) tablet Take 2,000 Units by mouth daily       nitroGLYcerin (NITROSTAT) 0.4 MG sublingual tablet For chest pain place 1 tablet under the tongue every 5 minutes for 3 doses. If symptoms persist 5 minutes after 1st dose call 911. (Patient not taking: Reported on 6/20/2019) 30 tablet 0       PAST SURGICAL HISTORY:  Past Surgical History:   Procedure Laterality Date     CORONARY ANGIOGRAPHY ADULT ORDER  04/30/2019    JEREL to proximal LAD     CV HEART CATHETERIZATION WITH POSSIBLE INTERVENTION N/A 4/30/2019    JEREL to proximal LAD     EXCISE LESION TRUNK  5/11/2012    Procedure:EXCISE LESION TRUNK; excision back cyst; Surgeon:ELENA LEE; Location:[facility]     ORTHOPEDIC SURGERY      >15 on right hand  / 1on left       ALLERGIES  No Known Allergies    FAMILY HX:  Family History   Problem Relation Age of Onset     Hyperlipidemia Father         on statins     Coronary Artery Disease Paternal Grandfather         CABG       SOCIAL HX:  Social History     Socioeconomic History     Marital status:      Spouse name: None     Number of children: None     Years of education: None     Highest education level: None   Occupational History     None   Social Needs     Financial resource strain: None     Food insecurity:     Worry: None     Inability: None     Transportation needs:     Medical: None     Non-medical: None   Tobacco Use     Smoking status: Never Smoker     Smokeless tobacco: Never Used   Substance and Sexual Activity     Alcohol use: Yes     Comment: 5 drinks/YEAR     Drug use: No     Sexual activity: None   Lifestyle     Physical activity:     Days per week: None     Minutes per session: None     Stress: None   Relationships     Social connections:     Talks on phone: None     Gets together: None     Attends Worship service: None     Active member of club or organization: None     Attends meetings of clubs or organizations: None     Relationship status: None      Intimate partner violence:     Fear of current or ex partner: None     Emotionally abused: None     Physically abused: None     Forced sexual activity: None   Other Topics Concern     Parent/sibling w/ CABG, MI or angioplasty before 65F 55M? No   Social History Narrative     None       ROS:  Constitutional: No fever, chills, or sweats. No weight gain/loss.   ENT: No visual disturbance, ear ache, epistaxis, sore throat.   Allergies/Immunologic: Negative.   Respiratory: No cough, hemoptysis.   Cardiovascular: As per HPI.   GI: No nausea, vomiting, hematemesis, melena, or hematochezia.   : No urinary frequency, dysuria, or hematuria.   Integument: Negative.   Psychiatric: Negative.   Neuro: Negative.   Endocrinology: Negative.   Musculoskeletal: No myalgia.    VITAL SIGNS:  /65 (BP Location: Left arm, Patient Position: Chair, Cuff Size: Adult Large)   Pulse 97   Wt (!) 223.2 kg (492 lb)   SpO2 97%   BMI 77.06 kg/m    Body mass index is 77.06 kg/m .  Wt Readings from Last 2 Encounters:   06/20/19 (!) 223.2 kg (492 lb)   05/14/19 (!) 226.5 kg (499 lb 6.4 oz)       PHYSICAL EXAM  Sarbjit Tran is a 53 year old male in no acute distress.  HEENT: Unremarkable.  Neck: JVP normal.  Carotids +4/4 bilaterally without bruits.  Lungs: CTA.  Cor: RRR. Normal S1 and S2.  No murmur, rub, or gallop.  PMI in Lf 5th ICS.  Abd: Soft, nontender, nondistended.  NABS.  No pulsatile mass.  Extremities: No C/C/E.  Pulses +4/4 symmetric in upper and lower extremities.  Neuro: Grossly intact.    LABS    Lab Results   Component Value Date    WBC 8.5 05/01/2019     Lab Results   Component Value Date    RBC 4.06 05/01/2019     Lab Results   Component Value Date    HGB 12.1 05/01/2019     Lab Results   Component Value Date    HCT 37.2 05/01/2019     No components found for: MCT  Lab Results   Component Value Date    MCV 92 05/01/2019     Lab Results   Component Value Date    MCH 29.8 05/01/2019     Lab Results   Component Value Date     MCHC 32.5 05/01/2019     Lab Results   Component Value Date    RDW 13.9 05/01/2019     Lab Results   Component Value Date     05/01/2019      Recent Labs   Lab Test 05/10/19 05/03/19  0521 05/02/19  1250    138 139   POTASSIUM 4.6 4.0 3.8   CHLORIDE 100 103 104   CO2 24 30 29   ANIONGAP  --  5 6   * 126* 112*   BUN 23 16 15   CR 1.00 0.95 0.86   DENG 9.7 8.6 9.1     Recent Labs   Lab Test 05/01/19  0550 05/01/19  0014   CHOL 163 161   HDL 36* 40   LDL 91 104*   TRIG 178* 85        ASSESSMENT AND PLAN:  HTN -  Hypertension adequately controlled. Cut down lisinipril in  Half to evaluate lightheadedness.  Lipids - Hyperlipidemia adequately controlled. Continue current regimen.  CAD - Patient's CAD adequately managed. Continue current regimen.DAPT for a year  CHF- Continue  Lasix and Spironolactone Chem 7 to check K in one month or early August    Recheck: one year

## 2019-07-09 ENCOUNTER — TRANSFERRED RECORDS (OUTPATIENT)
Dept: HEALTH INFORMATION MANAGEMENT | Facility: CLINIC | Age: 54
End: 2019-07-09

## 2019-08-06 DIAGNOSIS — E78.5 DYSLIPIDEMIA: ICD-10-CM

## 2019-08-06 DIAGNOSIS — I25.110 CORONARY ARTERY DISEASE INVOLVING NATIVE CORONARY ARTERY OF NATIVE HEART WITH UNSTABLE ANGINA PECTORIS (H): ICD-10-CM

## 2019-08-06 DIAGNOSIS — I25.5 ISCHEMIC CARDIOMYOPATHY: ICD-10-CM

## 2019-08-06 DIAGNOSIS — I21.4 NSTEMI (NON-ST ELEVATED MYOCARDIAL INFARCTION) (H): ICD-10-CM

## 2019-08-06 DIAGNOSIS — I10 ESSENTIAL HYPERTENSION: ICD-10-CM

## 2019-08-06 LAB
ANION GAP SERPL CALCULATED.3IONS-SCNC: 12 MMOL/L (ref 3–14)
BUN SERPL-MCNC: 24 MG/DL (ref 7–30)
CALCIUM SERPL-MCNC: 9.7 MG/DL (ref 8.5–10.1)
CHLORIDE SERPL-SCNC: 106 MMOL/L (ref 94–109)
CO2 SERPL-SCNC: 21 MMOL/L (ref 20–32)
CREAT SERPL-MCNC: 0.9 MG/DL (ref 0.66–1.25)
GFR SERPL CREATININE-BSD FRML MDRD: >90 ML/MIN/{1.73_M2}
GLUCOSE SERPL-MCNC: 125 MG/DL (ref 70–99)
POTASSIUM SERPL-SCNC: 3.7 MMOL/L (ref 3.4–5.3)
SODIUM SERPL-SCNC: 139 MMOL/L (ref 133–144)

## 2019-08-06 PROCEDURE — 80048 BASIC METABOLIC PNL TOTAL CA: CPT | Performed by: INTERNAL MEDICINE

## 2019-08-06 PROCEDURE — 36415 COLL VENOUS BLD VENIPUNCTURE: CPT | Performed by: INTERNAL MEDICINE

## 2019-08-30 ENCOUNTER — HOSPITAL ENCOUNTER (EMERGENCY)
Facility: CLINIC | Age: 54
Discharge: HOME | End: 2019-08-30
Attending: EMERGENCY MEDICINE | Admitting: EMERGENCY MEDICINE
Payer: COMMERCIAL

## 2019-08-30 VITALS
SYSTOLIC BLOOD PRESSURE: 121 MMHG | BODY MASS INDEX: 46.65 KG/M2 | RESPIRATION RATE: 18 BRPM | TEMPERATURE: 98.7 F | OXYGEN SATURATION: 99 % | DIASTOLIC BLOOD PRESSURE: 63 MMHG | WEIGHT: 315 LBS | HEIGHT: 69 IN

## 2019-08-30 DIAGNOSIS — L03.314 CELLULITIS OF GROIN, RIGHT: ICD-10-CM

## 2019-08-30 PROCEDURE — 99283 EMERGENCY DEPT VISIT LOW MDM: CPT

## 2019-08-30 RX ORDER — SULFAMETHOXAZOLE/TRIMETHOPRIM 800-160 MG
1 TABLET ORAL 2 TIMES DAILY
Qty: 14 TABLET | Refills: 0 | Status: SHIPPED | OUTPATIENT
Start: 2019-08-30 | End: 2020-02-18

## 2019-08-30 RX ORDER — CEPHALEXIN 500 MG/1
500 CAPSULE ORAL 4 TIMES DAILY
Qty: 28 CAPSULE | Refills: 0 | Status: SHIPPED | OUTPATIENT
Start: 2019-08-30 | End: 2020-02-18

## 2019-08-30 ASSESSMENT — MIFFLIN-ST. JEOR: SCORE: 3004.46

## 2019-08-30 ASSESSMENT — ENCOUNTER SYMPTOMS
DIZZINESS: 0
COLOR CHANGE: 1
ROS SKIN COMMENTS: NO DRAINAGE
FEVER: 0
WOUND: 1

## 2019-08-30 NOTE — ED AVS SNAPSHOT
Emergency Department  [address] [place] [id number]  Phone:  [phone]  Fax:  [phone]                                    Sarbjit Tran   MRN: [id number]    Department:   Emergency Department   Date of Visit:  8/30/2019           After Visit Summary Signature Page    I have received my discharge instructions, and my questions have been answered. I have discussed any challenges I see with this plan with the nurse or doctor.    ..........................................................................................................................................  Patient/Patient Representative Signature      ..........................................................................................................................................  Patient Representative Print Name and Relationship to Patient    ..................................................               ................................................  Date                                   Time    ..........................................................................................................................................  Reviewed by Signature/Title    ...................................................              ..............................................  Date                                               Time          22EPIC Rev 08/18

## 2019-08-30 NOTE — ED PROVIDER NOTES
History     Chief Complaint:    Possible Abscess     HPI   Sarbjit Tran is a 53 year old male with a history of NSTEMI, hypertension, Coronary Artery Disease, dyslipidemia, amongst others who presents with possible abscess. The patient reports that a few weeks ago he developed an abscess and redness that started in his buttock and groin but broke open itself and subsided on its own. He states that 8 days ago he developed the same type of sensation in the same area radiating into his upper thigh but he has not looked at it himself. The patient states that he thought it would go away on it's own but it has not therefore prompting his presentation. He denies fever, dizziness, drainage from the site, night sweats, changes in urination or bowel movements. The patient recalls a history of an abscess on his shoulder that needed surgical interventions.     Allergies:  No known drug allergies.       Medications:    Lipitor  Caltrate  Lasix  Toprol  Lisinopril  Nitrostat  Aldactone  Brilinta  Vitamin D3    Past Medical History:    abscess  Coronary Artery Disease  Dyslipidemia   Hypertension  NSTEMI  Obesity   Sleep apnea     Past Surgical History:    Coronary angiography  CV heart catherization with possible intervention  Excise lesion trunk  Orthopedic surgery     Family History:    Father: hypertension   Paternal grandfather: CABG     Social History:  The patient was accompanied to the ED by wife.  Smoking Status: Never Smoker  Smokeless Tobacco: Never Used  Alcohol Use: Positive  Drug Use: Negative  PCP: Linh Lopez   Marital Status:       Review of Systems   Constitutional: Negative for fever.        No night sweats   Gastrointestinal:        No changes in bowels   Genitourinary:        No changes in urination   Skin: Positive for color change and wound.        No drainage   Neurological: Negative for dizziness.   All other systems reviewed and are negative.    Physical Exam     Patient Vitals for  "the past 24 hrs:   BP Temp Temp src Heart Rate Resp SpO2 Height Weight   08/30/19 1427 -- -- -- -- -- -- -- (!) 217.9 kg (480 lb 6.4 oz)   08/30/19 1417 121/63 98.7  F (37.1  C) Oral 80 18 99 % 1.753 m (5' 9\") --      Physical Exam    Eyes:               Sclera white; Pupils are equal and round  ENT:                External ears and nares normal  CV:                  Rate as above with regular rhythm   Resp:               Breath sounds clear and equal bilaterally  GI:                   Abdomen is soft, non-tender, non-distended  MS:                  Moves all extremities  Skin:                Cellulitis R perineal area and buttock.  No induration or fluctuance.  No tenderness.  No purple/black discoloration including into groin.  Neuro:             Speech is normal and fluent. No apparent deficit.    Emergency Department Course     Emergency Department Course:    1440 Nursing notes and vitals reviewed. I performed an exam of the patient as documented above.     1450 Prior to discharge, I personally reviewed the results with the patient and all related questions were answered. The patien verbalized understanding and is amenable to plan.     Impression & Plan      Medical Decision Making:  Sarbjit Tran is a 53 year old male who presents to the emergency department today for evaluation of skin redness. There is no clinical evidence of sepsis, bacteremia, gangrene, or abscess. He will be treated with keflex and Bactrim and will return immediately for any worsening symptoms or failure to clear.    Diagnosis:    ICD-10-CM    1. Cellulitis of groin, right L03.314      Disposition:   The patient is discharged to home.     Discharge Medications:  Discharge Medication List as of 8/30/2019  3:00 PM      START taking these medications    Details   cephALEXin (KEFLEX) 500 MG capsule Take 1 capsule (500 mg) by mouth 4 times daily for 7 days, Disp-28 capsule, R-0, E-Prescribe      sulfamethoxazole-trimethoprim (BACTRIM DS) " 800-160 MG tablet Take 1 tablet by mouth 2 times daily for 7 days, Disp-14 tablet, R-0, E-Prescribe           Scribe Disclosure:  I, Orla Severson, am serving as a scribe at 2:39 PM on 8/30/2019 to document services personally performed by Ursula Mitchell MD based on my observations and the provider's statements to me.    EMERGENCY DEPARTMENT       Ursula Mitchell MD  08/30/19 [id number]

## 2019-08-30 NOTE — ED TRIAGE NOTES
Pt has a hx of skin abscesses.  Has an abscess on the right buttock with redness around into right groin area.  Increasing in size and discomfort for the past 8 days

## 2019-11-11 DIAGNOSIS — I21.4 NSTEMI (NON-ST ELEVATED MYOCARDIAL INFARCTION) (H): ICD-10-CM

## 2019-11-11 DIAGNOSIS — I25.10 CORONARY ARTERY DISEASE INVOLVING NATIVE HEART WITHOUT ANGINA PECTORIS, UNSPECIFIED VESSEL OR LESION TYPE: ICD-10-CM

## 2019-11-11 RX ORDER — METOPROLOL SUCCINATE 50 MG/1
50 TABLET, EXTENDED RELEASE ORAL DAILY
Qty: 90 TABLET | Refills: 2 | Status: SHIPPED | OUTPATIENT
Start: 2019-11-11 | End: 2020-07-16

## 2020-02-18 ENCOUNTER — OFFICE VISIT (OUTPATIENT)
Dept: FAMILY MEDICINE | Facility: CLINIC | Age: 55
End: 2020-02-18
Payer: COMMERCIAL

## 2020-02-18 VITALS
RESPIRATION RATE: 17 BRPM | WEIGHT: 315 LBS | BODY MASS INDEX: 46.65 KG/M2 | OXYGEN SATURATION: 99 % | HEIGHT: 69 IN | TEMPERATURE: 98.5 F | HEART RATE: 136 BPM | DIASTOLIC BLOOD PRESSURE: 88 MMHG | SYSTOLIC BLOOD PRESSURE: 126 MMHG

## 2020-02-18 DIAGNOSIS — I11.0 HYPERTENSIVE HEART DISEASE WITH HEART FAILURE (H): ICD-10-CM

## 2020-02-18 DIAGNOSIS — L72.3 INFECTED SEBACEOUS CYST: ICD-10-CM

## 2020-02-18 DIAGNOSIS — L72.3 SEBACEOUS CYST: Primary | ICD-10-CM

## 2020-02-18 DIAGNOSIS — L08.9 INFECTED SEBACEOUS CYST: ICD-10-CM

## 2020-02-18 DIAGNOSIS — E66.01 MORBID OBESITY (H): ICD-10-CM

## 2020-02-18 PROBLEM — I50.9 CHF (CONGESTIVE HEART FAILURE) (H): Status: ACTIVE | Noted: 2020-02-18

## 2020-02-18 PROCEDURE — 99213 OFFICE O/P EST LOW 20 MIN: CPT | Performed by: FAMILY MEDICINE

## 2020-02-18 ASSESSMENT — MIFFLIN-ST. JEOR: SCORE: 3002.18

## 2020-02-18 NOTE — PROGRESS NOTES
"Subjective     Sarbjit Tran is a 54 year old male who presents to clinic today for the following health issues:    HPI   Chief Complaint   Patient presents with     Derm Problem     sebaceous on right side of neck        Patient presenting today with swelling in the right side of the neck which is been going on for the past 1 week.  He previously has had a cyst in this area, which is apparent been inflamed and he thinks is forming an abscess.  He would like this taken care of.  He has had a similar issues in the past; had one done in the back.  He also has a cyst on the left side of the neck but this is not inflamed at this time      Review of Systems   Constitutional, HEENT, cardiovascular, pulmonary, gi and gu systems are negative, except as otherwise noted.      Objective    /88   Pulse 136   Temp 98.5  F (36.9  C)   Resp 17   Ht 1.753 m (5' 9\")   Wt (!) 218.2 kg (481 lb)   SpO2 99%   BMI 71.03 kg/m    Body mass index is 71.03 kg/m .  Physical Exam   GENERAL: Morbidly obese, alert and no distress  NECK: Tender swelling in the right postauricular area, slightly tender, fluctuant with redness of the skin in the center.  RESP: lungs clear to auscultation - no rales, rhonchi or wheezes  CV: regular rate and rhythm, no murmur, click or rub, no peripheral edema  ABDOMEN: obese  MS: no gross musculoskeletal defects noted, no edema    Assessment & Plan     Sarbjit was seen today for derm problem.    Diagnoses and all orders for this visit:    Sebaceous cyst   This is consistent with an infected sebaceous cyst.  For the location discussed evaluation by general surgery for definitive treatment.  -     GENERAL SURG ADULT REFERRAL; Future    Infected sebaceous cyst: Rt postauricular area  -     GENERAL SURG ADULT REFERRAL; Future    Hypertensive heart disease with heart failure (H)       -    Under the care of cardiology.    Morbid obesity (H)       -  Following up with PCP    Return in about 1 day (around " 2/19/2020) for follow up with specialist as scheduled.

## 2020-02-19 ENCOUNTER — OFFICE VISIT (OUTPATIENT)
Dept: SURGERY | Facility: CLINIC | Age: 55
End: 2020-02-19
Payer: COMMERCIAL

## 2020-02-19 VITALS
SYSTOLIC BLOOD PRESSURE: 126 MMHG | BODY MASS INDEX: 46.65 KG/M2 | HEART RATE: 136 BPM | WEIGHT: 315 LBS | DIASTOLIC BLOOD PRESSURE: 88 MMHG | HEIGHT: 69 IN

## 2020-02-19 DIAGNOSIS — L72.0 RUPTURED SEBACEOUS CYST: Primary | ICD-10-CM

## 2020-02-19 PROCEDURE — 99243 OFF/OP CNSLTJ NEW/EST LOW 30: CPT | Performed by: SURGERY

## 2020-02-19 RX ORDER — CEPHALEXIN 500 MG/1
500 CAPSULE ORAL 4 TIMES DAILY
Qty: 40 CAPSULE | Refills: 0 | Status: SHIPPED | OUTPATIENT
Start: 2020-02-19 | End: 2020-07-16

## 2020-02-19 ASSESSMENT — MIFFLIN-ST. JEOR: SCORE: 3002.18

## 2020-02-19 NOTE — LETTER
2/19/2020         RE: Sarbjit Tran  [address] [facility] [address]        Dear Colleague,    Thank you for referring your patient, Sarbjit Tran, to the [facility]. Please see a copy of my visit note below.    Patient seen in consultation for infected cyst by Olvin Hartmann    HPI:  Patient is a 54 year old male  with complaints of infected cyst  The patient noticed the symptoms about 1 to 1-1/2 weeks ago.    Has a history of a known skin cyst here for many years  No previous infections that he can recall  No fevers.  He has noticed some drainage  nothing makes the episode better.  Area is tender  He also has a similar cyst on the left neck though this is currently not infected or painful.  This is also been there for many years      Review Of Systems    Skin: as above  Ears/Nose/Throat: negative  Respiratory: Shortness of breath  Cardiovascular: negative  Gastrointestinal: negative  Genitourinary: negative  Musculoskeletal: negative  Neurologic: negative  Hematologic/Lymphatic/Immunologic: negative  Endocrine: negative      Past Medical History:   Diagnosis Date     Abscess      Coronary artery disease     JEREL to proximal LAD on 4/30/19     Dyslipidemia      Hypertension      NSTEMI (non-ST elevated myocardial infarction) (H) 04/30/2019    JEREL to proximal LAD     Obesity      Sleep apnea     NEVER BEEN DIAGNOSED       Past Surgical History:   Procedure Laterality Date     CORONARY ANGIOGRAPHY ADULT ORDER  04/30/2019    JEREL to proximal LAD     CV HEART CATHETERIZATION WITH POSSIBLE INTERVENTION N/A 4/30/2019    JEREL to proximal LAD     EXCISE LESION TRUNK  5/11/2012    Procedure:EXCISE LESION TRUNK; excision back cyst; Surgeon:ELENA LEE; Location:[facility]     ORTHOPEDIC SURGERY      >15 on right hand  / 1on left       Social History     Socioeconomic History     Marital status:      Spouse name: Not on file     Number of children: Not on file     Years of  education: Not on file     Highest education level: Not on file   Occupational History     Not on file   Social Needs     Financial resource strain: Not on file     Food insecurity:     Worry: Not on file     Inability: Not on file     Transportation needs:     Medical: Not on file     Non-medical: Not on file   Tobacco Use     Smoking status: Never Smoker     Smokeless tobacco: Never Used   Substance and Sexual Activity     Alcohol use: Yes     Comment: 5 drinks/YEAR     Drug use: No     Sexual activity: Not on file   Lifestyle     Physical activity:     Days per week: Not on file     Minutes per session: Not on file     Stress: Not on file   Relationships     Social connections:     Talks on phone: Not on file     Gets together: Not on file     Attends Jewish service: Not on file     Active member of club or organization: Not on file     Attends meetings of clubs or organizations: Not on file     Relationship status: Not on file     Intimate partner violence:     Fear of current or ex partner: Not on file     Emotionally abused: Not on file     Physically abused: Not on file     Forced sexual activity: Not on file   Other Topics Concern     Parent/sibling w/ CABG, MI or angioplasty before 65F 55M? No   Social History Narrative     Not on file       Current Outpatient Medications   Medication Sig Dispense Refill     cephALEXin 500 MG PO capsule Take 1 capsule (500 mg) by mouth 4 times daily for 10 days 40 capsule 0     acetaminophen (TYLENOL) 325 MG tablet Take 975 mg by mouth daily with Ibuprofen       atorvastatin (LIPITOR) 40 MG tablet Take 1 tablet (40 mg) by mouth daily 90 tablet 3     calcium carbonate 600 mg-vitamin D 400 units (CALTRATE) 600-400 MG-UNIT per tablet Take 1 tablet by mouth daily       furosemide (LASIX) 40 MG tablet Take 1 tablet (40 mg) by mouth daily 90 tablet 3     lisinopril (PRINIVIL/ZESTRIL) 20 MG tablet Take 1 tablet (20 mg) by mouth daily 90 tablet 3     metoprolol succinate ER  "(TOPROL-XL) 50 MG 24 hr tablet Take 1 tablet (50 mg) by mouth daily 90 tablet 2     nitroGLYcerin (NITROSTAT) 0.4 MG sublingual tablet For chest pain place 1 tablet under the tongue every 5 minutes for 3 doses. If symptoms persist 5 minutes after 1st dose call 911. 30 tablet 0     NONFORMULARY Take 1 tablet by mouth 2 times daily -  CIRCULEG - combination nutritional supplement to improve circulation       order for DME Equipment being ordered: Walker ()  Treatment Diagnosis: morbid obesity 1 Device 0     spironolactone (ALDACTONE) 25 MG tablet Take 1 tablet (25 mg) by mouth daily 90 tablet 3     ticagrelor (BRILINTA) 90 MG tablet Take 1 tablet (90 mg) by mouth 2 times daily 180 tablet 3     vitamin D3 (CHOLECALCIFEROL) 1000 units (25 mcg) tablet Take 2,000 Units by mouth daily         Medications and history reviewed    Physical exam:  Vitals: /88   Pulse 136   Ht 1.753 m (5' 9\")   Wt (!) 218.2 kg (481 lb)   BMI 71.03 kg/m     BMI= Body mass index is 71.03 kg/m .    Constitutional: healthy, alert and no distress  Head: positive findings: Right neck in postauricular to suboccipital area with large ruptured sebaceous cyst.  There is some central fluctuance.  The entire area of indurated tissue approximately 8 cm.  Some central erythema, but I do not see any surrounding or spreading cellulitis.  No current drainage, but I see a small pinpoint opening currently scabbed over where there likely was previously  Left side of the neck in similar location there is a approximately 2 cm round likely sebaceous cyst no signs of infection currently, nontender  Cardiovascular: negative, PMI normal. No lifts, heaves, or thrills. RRR. No murmurs, clicks gallops or rub  Respiratory: negative, Percussion normal. Good diaphragmatic excursion. Lungs clear  Gastrointestinal: Abdomen soft, non-tender. BS normal. No masses, organomegaly, positive findings: obese  : Deferred  Musculoskeletal: Some deformity to right hand " and fingers  Skin: See above  Psychiatric: mentation appears normal and affect normal/bright  Hematologic/Lymphatic/Immunologic: Normal cervical lymph nodes  Patient able to get up on table with mild difficulty. Uses walker      Assessment:     ICD-10-CM    1. Ruptured sebaceous cyst L72.0 cephALEXin 500 MG PO capsule     Plan: Large area involved by this ruptured sebaceous cyst, likely would require operating room for excision though with active infection formal excision not preferred at this time.  One option is small incision for drainage of the area, this would require wound packing, with then future excision once the infection is cleared and the swelling reduced.  The other option being trial of antibiotics upfront to see if this can clear the infection and decrease the involved area of swelling, such that when excision performed it can be through a small incision and if no further infection, the wound could be closed and avoid packing.  Patient would have difficulty accessing this portion of his neck to perform the wound care, and his wife is currently out of town.  After discussing the options, patient decided on a trial of antibiotics.  Patient instructed that if this infection appears to be worsening or not improving on the antibiotics then we be be forced to proceed to drainage and packing.  Follow-up after the antibiotic course completed.  I will be out of town at this time so he can follow-up with 1 of my partners.  If worsening such as developing fevers or worse pain then he may need an ER visit.    Matti Franco MD      Again, thank you for allowing me to participate in the care of your patient.        Sincerely,        Matti Franco MD

## 2020-02-19 NOTE — PROGRESS NOTES
Patient seen in consultation for infected cyst by Olvin Hartmann    HPI:  Patient is a 54 year old male  with complaints of infected cyst  The patient noticed the symptoms about 1 to 1-1/2 weeks ago.    Has a history of a known skin cyst here for many years  No previous infections that he can recall  No fevers.  He has noticed some drainage  nothing makes the episode better.  Area is tender  He also has a similar cyst on the left neck though this is currently not infected or painful.  This is also been there for many years      Review Of Systems    Skin: as above  Ears/Nose/Throat: negative  Respiratory: Shortness of breath  Cardiovascular: negative  Gastrointestinal: negative  Genitourinary: negative  Musculoskeletal: negative  Neurologic: negative  Hematologic/Lymphatic/Immunologic: negative  Endocrine: negative      Past Medical History:   Diagnosis Date     Abscess      Coronary artery disease     JEREL to proximal LAD on 4/30/19     Dyslipidemia      Hypertension      NSTEMI (non-ST elevated myocardial infarction) (H) 04/30/2019    JEREL to proximal LAD     Obesity      Sleep apnea     NEVER BEEN DIAGNOSED       Past Surgical History:   Procedure Laterality Date     CORONARY ANGIOGRAPHY ADULT ORDER  04/30/2019    JEREL to proximal LAD     CV HEART CATHETERIZATION WITH POSSIBLE INTERVENTION N/A 4/30/2019    JEREL to proximal LAD     EXCISE LESION TRUNK  5/11/2012    Procedure:EXCISE LESION TRUNK; excision back cyst; Surgeon:ELENA LEE; Location:[facility]     ORTHOPEDIC SURGERY      >15 on right hand  / 1on left       Social History     Socioeconomic History     Marital status:      Spouse name: Not on file     Number of children: Not on file     Years of education: Not on file     Highest education level: Not on file   Occupational History     Not on file   Social Needs     Financial resource strain: Not on file     Food insecurity:     Worry: Not on file     Inability: Not on file     Transportation needs:      Medical: Not on file     Non-medical: Not on file   Tobacco Use     Smoking status: Never Smoker     Smokeless tobacco: Never Used   Substance and Sexual Activity     Alcohol use: Yes     Comment: 5 drinks/YEAR     Drug use: No     Sexual activity: Not on file   Lifestyle     Physical activity:     Days per week: Not on file     Minutes per session: Not on file     Stress: Not on file   Relationships     Social connections:     Talks on phone: Not on file     Gets together: Not on file     Attends Synagogue service: Not on file     Active member of club or organization: Not on file     Attends meetings of clubs or organizations: Not on file     Relationship status: Not on file     Intimate partner violence:     Fear of current or ex partner: Not on file     Emotionally abused: Not on file     Physically abused: Not on file     Forced sexual activity: Not on file   Other Topics Concern     Parent/sibling w/ CABG, MI or angioplasty before 65F 55M? No   Social History Narrative     Not on file       Current Outpatient Medications   Medication Sig Dispense Refill     cephALEXin 500 MG PO capsule Take 1 capsule (500 mg) by mouth 4 times daily for 10 days 40 capsule 0     acetaminophen (TYLENOL) 325 MG tablet Take 975 mg by mouth daily with Ibuprofen       atorvastatin (LIPITOR) 40 MG tablet Take 1 tablet (40 mg) by mouth daily 90 tablet 3     calcium carbonate 600 mg-vitamin D 400 units (CALTRATE) 600-400 MG-UNIT per tablet Take 1 tablet by mouth daily       furosemide (LASIX) 40 MG tablet Take 1 tablet (40 mg) by mouth daily 90 tablet 3     lisinopril (PRINIVIL/ZESTRIL) 20 MG tablet Take 1 tablet (20 mg) by mouth daily 90 tablet 3     metoprolol succinate ER (TOPROL-XL) 50 MG 24 hr tablet Take 1 tablet (50 mg) by mouth daily 90 tablet 2     nitroGLYcerin (NITROSTAT) 0.4 MG sublingual tablet For chest pain place 1 tablet under the tongue every 5 minutes for 3 doses. If symptoms persist 5 minutes after 1st dose call  "911. 30 tablet 0     NONFORMULARY Take 1 tablet by mouth 2 times daily -  CIRCULEG - combination nutritional supplement to improve circulation       order for DME Equipment being ordered: Walker ()  Treatment Diagnosis: morbid obesity 1 Device 0     spironolactone (ALDACTONE) 25 MG tablet Take 1 tablet (25 mg) by mouth daily 90 tablet 3     ticagrelor (BRILINTA) 90 MG tablet Take 1 tablet (90 mg) by mouth 2 times daily 180 tablet 3     vitamin D3 (CHOLECALCIFEROL) 1000 units (25 mcg) tablet Take 2,000 Units by mouth daily         Medications and history reviewed    Physical exam:  Vitals: /88   Pulse 136   Ht 1.753 m (5' 9\")   Wt (!) 218.2 kg (481 lb)   BMI 71.03 kg/m    BMI= Body mass index is 71.03 kg/m .    Constitutional: healthy, alert and no distress  Head: positive findings: Right neck in postauricular to suboccipital area with large ruptured sebaceous cyst.  There is some central fluctuance.  The entire area of indurated tissue approximately 8 cm.  Some central erythema, but I do not see any surrounding or spreading cellulitis.  No current drainage, but I see a small pinpoint opening currently scabbed over where there likely was previously  Left side of the neck in similar location there is a approximately 2 cm round likely sebaceous cyst no signs of infection currently, nontender  Cardiovascular: negative, PMI normal. No lifts, heaves, or thrills. RRR. No murmurs, clicks gallops or rub  Respiratory: negative, Percussion normal. Good diaphragmatic excursion. Lungs clear  Gastrointestinal: Abdomen soft, non-tender. BS normal. No masses, organomegaly, positive findings: obese  : Deferred  Musculoskeletal: Some deformity to right hand and fingers  Skin: See above  Psychiatric: mentation appears normal and affect normal/bright  Hematologic/Lymphatic/Immunologic: Normal cervical lymph nodes  Patient able to get up on table with mild difficulty. Uses walker      Assessment:     ICD-10-CM    1. " Ruptured sebaceous cyst L72.0 cephALEXin 500 MG PO capsule     Plan: Large area involved by this ruptured sebaceous cyst, likely would require operating room for excision though with active infection formal excision not preferred at this time.  One option is small incision for drainage of the area, this would require wound packing, with then future excision once the infection is cleared and the swelling reduced.  The other option being trial of antibiotics upfront to see if this can clear the infection and decrease the involved area of swelling, such that when excision performed it can be through a small incision and if no further infection, the wound could be closed and avoid packing.  Patient would have difficulty accessing this portion of his neck to perform the wound care, and his wife is currently out of town.  After discussing the options, patient decided on a trial of antibiotics.  Patient instructed that if this infection appears to be worsening or not improving on the antibiotics then we be be forced to proceed to drainage and packing.  Follow-up after the antibiotic course completed.  I will be out of town at this time so he can follow-up with 1 of my partners.  If worsening such as developing fevers or worse pain then he may need an ER visit.    Matti Franco MD

## 2020-02-24 ENCOUNTER — HEALTH MAINTENANCE LETTER (OUTPATIENT)
Age: 55
End: 2020-02-24

## 2020-02-25 ENCOUNTER — HOSPITAL ENCOUNTER (EMERGENCY)
Facility: CLINIC | Age: 55
Discharge: HOME | End: 2020-02-25
Attending: EMERGENCY MEDICINE | Admitting: EMERGENCY MEDICINE
Payer: COMMERCIAL

## 2020-02-25 VITALS
TEMPERATURE: 98.3 F | WEIGHT: 315 LBS | BODY MASS INDEX: 46.65 KG/M2 | OXYGEN SATURATION: 100 % | RESPIRATION RATE: 24 BRPM | SYSTOLIC BLOOD PRESSURE: 135 MMHG | HEIGHT: 69 IN | DIASTOLIC BLOOD PRESSURE: 63 MMHG

## 2020-02-25 DIAGNOSIS — L02.11 ABSCESS OF NECK: ICD-10-CM

## 2020-02-25 PROCEDURE — 10060 I&D ABSCESS SIMPLE/SINGLE: CPT

## 2020-02-25 PROCEDURE — 99283 EMERGENCY DEPT VISIT LOW MDM: CPT | Mod: 25

## 2020-02-25 RX ORDER — HYDROCODONE BITARTRATE AND ACETAMINOPHEN 5; 325 MG/1; MG/1
2 TABLET ORAL EVERY 6 HOURS PRN
Qty: 12 TABLET | Refills: 0 | Status: SHIPPED | OUTPATIENT
Start: 2020-02-25 | End: 2020-07-16

## 2020-02-25 RX ORDER — CLINDAMYCIN HCL 300 MG
300 CAPSULE ORAL 4 TIMES DAILY
Qty: 40 CAPSULE | Refills: 0 | Status: SHIPPED | OUTPATIENT
Start: 2020-02-25 | End: 2020-07-16

## 2020-02-25 ASSESSMENT — MIFFLIN-ST. JEOR: SCORE: 2974.97

## 2020-02-25 ASSESSMENT — ENCOUNTER SYMPTOMS
COLOR CHANGE: 1
WOUND: 1

## 2020-02-25 NOTE — ED PROVIDER NOTES
"  History     Chief Complaint:  Wound Check    The history is provided by the patient.      Sarbjit Tran is a 54 year old male who presents with a sebaceous cyst. According to the patient, he has had a sebaceous cyst on the right posterior portion of his neck and is being treated with Keflex. He states he has seen a surgeon so far, and believes the plan was to take the full course of Keflex and then surgically remove the cyst, but after 6 days of the antibiotic it is only becoming more red and inflamed, so he wanted to come into the ED to have it checked out. He notes he has been taking Tylenol for his discomfort.     Allergies:  No Known Allergies     Medications:    Atorvastatin  Cephalexin  Lasix  Lisinopril  Toprol  Nitrostat  Aldactone  Brilinta  Aspirin 81 mg    Past Medical History:    Abscess  CAD  Dyslipidemia  Hypertension  NSTEMI  Obesity  Sleep apnea  Obesity  CHF    Past Surgical History:    Coronary angiography  Heart catheterization  Excision lesion trunk  Orthopedic surgery     Family History:    Hyperlipidemia  CAD    Social History:  Smoking status: never smoker  Alcohol use: yes, rarely  Drug use: no  PCP: Linh Lopez MD  Presents to the ED alone  Marital Status:   [2]     Review of Systems   Skin: Positive for color change (increased redness of cyst on neck ) and wound (cyst on neck ).   All other systems reviewed and are negative.      Physical Exam     Patient Vitals for the past 24 hrs:   BP Temp Temp src Heart Rate Resp SpO2 Height Weight   02/25/20 1107 135/63 98.3  F (36.8  C) Oral 108 24 100 % 1.753 m (5' 9\") (!) 215.5 kg (475 lb)         Physical Exam  Vitals: reviewed by me  General: Pt seen on [facility], cooperative, and alert to conversation  Eyes: Tracking well, clear conjunctiva BL  ENT: MMM, midline trachea.   Lungs:   No tachypnea, no accessory muscle use. No respiratory distress.   CV: Rate as above, regular rhythm.    MSK: no peripheral edema " "or joint effusion.  No evidence of trauma  Skin: No rash, normal turgor and temperature  Does have a large, 5 to 6 cm wide sebaceous cyst on the back of his right neck.  There is tenderness induration and erythema, also with a fluctuant pocket.  It is tender to palpation.  Neuro: Clear speech and no facial droop.  Psych: Not RIS, no e/o AH/VH    Emergency Department Course         [facility]    PROCEDURE: -Incision/Drainage  Date/Time: 2/25/2020 12:34 PM  Performed by: Raymond Gustafson MD  Authorized by: Raymond Gustafson MD       LOCATION:      Type:  Abscess    Location:  Neck    Neck location:  R posterior    PRE-PROCEDURE DETAILS:     Skin preparation:  Antiseptic wash    ANESTHESIA (see MAR for exact dosages):     Anesthesia method:  Local infiltration    Local anesthetic:  Bupivacaine 0.25% WITH epi    PROCEDURE TYPE:     Complexity:  Simple    PROCEDURE DETAILS:     Needle aspiration: no      Incision types:  Stab incision    Incision depth:  Dermal    Scalpel blade:  11    Wound management:  Probed and deloculated and extensive cleaning    Drainage:  Purulent    Drainage amount:  Moderate    Wound treatment:  Wound left open    Packing materials:  1/4 in iodoform gauze    Amount 1/4\" iodoform:  6 inches   Post-procedure details:     PROCEDURE   Patient Tolerance:  Patient tolerated the procedure well with no immediate complications      :       Interventions:  None    Emergency Department Course:  Past medical records, nursing notes, and vitals reviewed.    1110 I performed an exam of the patient as documented above.     1223 Incision and drainage, as detailed above.     1240 Patient rechecked and updated.      Findings and plan explained to the Patient. Patient discharged home with instructions regarding supportive care, medications, and reasons to return. The importance of close follow-up was reviewed. The patient was prescribed Cleocin and Norco.  "     I personally answered all related questions prior to discharge.      Impression & Plan     Medical Decision Making:  Sarbjit Tran is a very pleasant 54 year old male who presents to the emergency department today with what appears to be an infected sebaceous cyst on his right neck. He has had it there for a long time, but it does appear to be red and slightly inflamed. Based on Dr Franco's note, the plan was to try antibiotics, and if that didn't work, to do an I & d and place a wick. Patient is asking that I do this part of his management plan here in the ER, as the pain has become significantly worse, and the swelling and size of the cyst has grown. I think that there is likely a superinfected component here, and I did do a very small incision and drained a significant amount of pus. Patient was packed and he state she feels comfortable seeing his surgeon in the next 48 hours to talk about a formal elliptical excision, and we have discussed red flags for him to come back to the ER. Since there is so much pus, I will broaden coverage to include MRSA with clindamycin. Patient is okay with this plan, will give pain control as well, and discharge home with very clear return to ED precautions.     Critical Care Time: was 0 minutes for this patient excluding procedures    Discharge Diagnosis:    ICD-10-CM    1. Abscess of neck L02.11      Disposition:  Discharged to home.       Discharge Medications:  New Prescriptions    CLINDAMYCIN (CLEOCIN) 300 MG CAPSULE    Take 1 capsule (300 mg) by mouth 4 times daily for 7 days    HYDROCODONE-ACETAMINOPHEN (NORCO) 5-325 MG TABLET    Take 2 tablets by mouth every 6 hours as needed       Scribe Disclosure:  I, Oma Camejo, am serving as a scribe at 11:10 AM on 2/25/2020 to document services personally performed by Raymond Gustafson MD based on my observations and the provider's statements to me.    2/25/2020    EMERGENCY DEPARTMENT       Raymond Gustafson  Luis Armando Montiel MD  02/25/20 8046

## 2020-02-25 NOTE — ED AVS SNAPSHOT
Emergency Department  [address] [place] [address]  Phone:  [phone]  Fax:  [phone]                                    Sarbjit Tran   MRN: [id number]    Department:   Emergency Department   Date of Visit:  2/25/2020           After Visit Summary Signature Page    I have received my discharge instructions, and my questions have been answered. I have discussed any challenges I see with this plan with the nurse or doctor.    ..........................................................................................................................................  Patient/Patient Representative Signature      ..........................................................................................................................................  Patient Representative Print Name and Relationship to Patient    ..................................................               ................................................  Date                                   Time    ..........................................................................................................................................  Reviewed by Signature/Title    ...................................................              ..............................................  Date                                               Time          22EPIC Rev 08/18

## 2020-02-25 NOTE — ED TRIAGE NOTES
Cyst on back of neck. Saw surgeon at [facility] and started him on keflex for the last 6 days and has not improved. Wants it dealt with surgically.

## 2020-02-26 ENCOUNTER — TELEPHONE (OUTPATIENT)
Dept: SURGERY | Facility: CLINIC | Age: 55
End: 2020-02-26

## 2020-02-26 NOTE — TELEPHONE ENCOUNTER
.Reason for Call:  Other call back    Detailed comments: Patient is calling to schedule surgery orders are needed. Thank you / patient notes that he was seen in [facility] 02/25/2020 and had his cyst drained. Patient is needing to have his [place] removed. Patient notes that he has been calling and leaving Amanda messages and no one has called back please call and advise Thank you     Phone Number Patient can be reached at: Cell number on file:    Telephone Information:   Mobile [phone]       Best Time: any    Can we leave a detailed message on this number? YES    Call taken on 2/26/2020 at 3:33 PM by Ana Moore

## 2020-02-27 NOTE — TELEPHONE ENCOUNTER
I have left messages and have tried to call patient due to having several openings with npo return call. Amanda Mueller Cma

## 2020-02-27 NOTE — TELEPHONE ENCOUNTER
It looks like he had an I&D in the ER 2 days ago he should be seen in clinic again. I will be out of town until the 9th so he can see Bev/To/Julio aguilar

## 2020-02-27 NOTE — TELEPHONE ENCOUNTER
Left a voice message for patient to call and schedule an appointment with  general surgery [phone]

## 2020-02-28 ENCOUNTER — OFFICE VISIT (OUTPATIENT)
Dept: SURGERY | Facility: CLINIC | Age: 55
End: 2020-02-28
Payer: COMMERCIAL

## 2020-02-28 VITALS
HEIGHT: 69 IN | DIASTOLIC BLOOD PRESSURE: 73 MMHG | HEART RATE: 88 BPM | WEIGHT: 315 LBS | SYSTOLIC BLOOD PRESSURE: 135 MMHG | BODY MASS INDEX: 46.65 KG/M2

## 2020-02-28 DIAGNOSIS — L72.3 INFECTED SEBACEOUS CYST: Primary | ICD-10-CM

## 2020-02-28 DIAGNOSIS — L08.9 INFECTED SEBACEOUS CYST: Primary | ICD-10-CM

## 2020-02-28 PROCEDURE — 10060 I&D ABSCESS SIMPLE/SINGLE: CPT | Performed by: SURGERY

## 2020-02-28 ASSESSMENT — MIFFLIN-ST. JEOR: SCORE: 2974.97

## 2020-02-28 NOTE — LETTER
"    2/28/2020         RE: Sarbjit Tran  [address] [facility] [id number]        Dear Colleague,    Thank you for referring your patient, Sarbjit Tran, to the [facility]. Please see a copy of my visit note below.    Risks explained including bleeding, infection, scar and recurrence.    Patient with an infected right posterior neck sebaceous cyst.   Feeling better since had been drained. But the opening is very small.   Will do better to open length of the cavity and remove small amount of skin with it.      After sterile prep with betadine the area was infiltrated with local.  An incision was made from the entire length of the cavity and some excess skin removed.  The oil was sopped out and then packed with gauze soaked in the lidocaine and appeared to be a cyst with mucus material.    /73   Pulse 88   Ht 1.753 m (5' 9\")   Wt (!) 215.5 kg (475 lb)   BMI 70.15 kg/m       Procedure  Preop dx:  Infected sebaceous cyst   Post op dx:  Same  Procedure:  3  cm incision of abscess and removal of cyst contents.   After sterile prep with betadine the area was infiltrated with local.  An incision was made from the entire length of the cavity and some excess skin removed.  The oil was sopped out and then packed with gauze soaked in the lidocaine and appeared to be a cyst with mucus material.    Anesthesia:  2% lidocaine  with epinephrine   Est. blood loss: 4 cc  Patient tolerated procedure well.  Hemostasis obtained with pressure.  Follow up with me in 4 weeks.   Patient is on brilenta     Olvin Pablo MD        Office Visit     2/19/2020  [facility] Lit Franco, Matti Lim MD   Surgery   Ruptured sebaceous cyst   Dx   Consult     Reason for Visit    Progress Notes     Expand All Collapse All    Patient seen in consultation for infected cyst by Olvin Hartmann     HPI:  Patient is a 54 year old male  with complaints of infected cyst  The patient noticed the symptoms " about 1 to 1-1/2 weeks ago.    Has a history of a known skin cyst here for many years  No previous infections that he can recall  No fevers.  He has noticed some drainage  nothing makes the episode better.  Area is tender  He also has a similar cyst on the left neck though this is currently not infected or painful.  This is also been there for many years        Review Of Systems     Skin: as above  Ears/Nose/Throat: negative  Respiratory: Shortness of breath  Cardiovascular: negative  Gastrointestinal: negative  Genitourinary: negative  Musculoskeletal: negative  Neurologic: negative  Hematologic/Lymphatic/Immunologic: negative  Endocrine: negative        Past Medical History        Past Medical History:   Diagnosis Date     Abscess       Coronary artery disease       JEREL to proximal LAD on 4/30/19     Dyslipidemia       Hypertension       NSTEMI (non-ST elevated myocardial infarction) (H) 04/30/2019     JEREL to proximal LAD     Obesity       Sleep apnea       NEVER BEEN DIAGNOSED            Past Surgical History         Past Surgical History:   Procedure Laterality Date     CORONARY ANGIOGRAPHY ADULT ORDER   04/30/2019     JEREL to proximal LAD     CV HEART CATHETERIZATION WITH POSSIBLE INTERVENTION N/A 4/30/2019     JEREL to proximal LAD     EXCISE LESION TRUNK   5/11/2012     Procedure:EXCISE LESION TRUNK; excision back cyst; Surgeon:ELENA LEE; Location:[facility]     ORTHOPEDIC SURGERY         >15 on right hand  / 1on left            Social History   Social History            Socioeconomic History     Marital status:        Spouse name: Not on file     Number of children: Not on file     Years of education: Not on file     Highest education level: Not on file   Occupational History     Not on file   Social Needs     Financial resource strain: Not on file     Food insecurity:       Worry: Not on file       Inability: Not on file     Transportation needs:       Medical: Not on file       Non-medical: Not  on file   Tobacco Use     Smoking status: Never Smoker     Smokeless tobacco: Never Used   Substance and Sexual Activity     Alcohol use: Yes       Comment: 5 drinks/YEAR     Drug use: No     Sexual activity: Not on file   Lifestyle     Physical activity:       Days per week: Not on file       Minutes per session: Not on file     Stress: Not on file   Relationships     Social connections:       Talks on phone: Not on file       Gets together: Not on file       Attends Sabianism service: Not on file       Active member of club or organization: Not on file       Attends meetings of clubs or organizations: Not on file       Relationship status: Not on file     Intimate partner violence:       Fear of current or ex partner: Not on file       Emotionally abused: Not on file       Physically abused: Not on file       Forced sexual activity: Not on file   Other Topics Concern     Parent/sibling w/ CABG, MI or angioplasty before 65F 55M? No   Social History Narrative     Not on file            Current Outpatient Prescriptions          Current Outpatient Medications   Medication Sig Dispense Refill     cephALEXin 500 MG PO capsule Take 1 capsule (500 mg) by mouth 4 times daily for 10 days 40 capsule 0     acetaminophen (TYLENOL) 325 MG tablet Take 975 mg by mouth daily with Ibuprofen         atorvastatin (LIPITOR) 40 MG tablet Take 1 tablet (40 mg) by mouth daily 90 tablet 3     calcium carbonate 600 mg-vitamin D 400 units (CALTRATE) 600-400 MG-UNIT per tablet Take 1 tablet by mouth daily         furosemide (LASIX) 40 MG tablet Take 1 tablet (40 mg) by mouth daily 90 tablet 3     lisinopril (PRINIVIL/ZESTRIL) 20 MG tablet Take 1 tablet (20 mg) by mouth daily 90 tablet 3     metoprolol succinate ER (TOPROL-XL) 50 MG 24 hr tablet Take 1 tablet (50 mg) by mouth daily 90 tablet 2     nitroGLYcerin (NITROSTAT) 0.4 MG sublingual tablet For chest pain place 1 tablet under the tongue every 5 minutes for 3 doses. If symptoms persist  "5 minutes after 1st dose call 911. 30 tablet 0     NONFORMULARY Take 1 tablet by mouth 2 times daily -  CIRCULEG - combination nutritional supplement to improve circulation         order for DME Equipment being ordered: Walker ()  Treatment Diagnosis: morbid obesity 1 Device 0     spironolactone (ALDACTONE) 25 MG tablet Take 1 tablet (25 mg) by mouth daily 90 tablet 3     ticagrelor (BRILINTA) 90 MG tablet Take 1 tablet (90 mg) by mouth 2 times daily 180 tablet 3     vitamin D3 (CHOLECALCIFEROL) 1000 units (25 mcg) tablet Take 2,000 Units by mouth daily                Medications and history reviewed     Physical exam:  Vitals: /88   Pulse 136   Ht 1.753 m (5' 9\")   Wt (!) 218.2 kg (481 lb)   BMI 71.03 kg/m    BMI= Body mass index is 71.03 kg/m .     Constitutional: healthy, alert and no distress  Head: positive findings: Right neck in postauricular to suboccipital area with large ruptured sebaceous cyst.  There is some central fluctuance.  The entire area of indurated tissue approximately 8 cm.  Some central erythema, but I do not see any surrounding or spreading cellulitis.  No current drainage, but I see a small pinpoint opening currently scabbed over where there likely was previously  Left side of the neck in similar location there is a approximately 2 cm round likely sebaceous cyst no signs of infection currently, nontender  Cardiovascular: negative, PMI normal. No lifts, heaves, or thrills. RRR. No murmurs, clicks gallops or rub  Respiratory: negative, Percussion normal. Good diaphragmatic excursion. Lungs clear  Gastrointestinal: Abdomen soft, non-tender. BS normal. No masses, organomegaly, positive findings: obese  : Deferred  Musculoskeletal: Some deformity to right hand and fingers  Skin: See above  Psychiatric: mentation appears normal and affect normal/bright  Hematologic/Lymphatic/Immunologic: Normal cervical lymph nodes  Patient able to get up on table with mild difficulty. Uses " walker        Assessment:       ICD-10-CM     1. Ruptured sebaceous cyst L72.0 cephALEXin 500 MG PO capsule      Plan: Large area involved by this ruptured sebaceous cyst, likely would require operating room for excision though with active infection formal excision not preferred at this time.  One option is small incision for drainage of the area, this would require wound packing, with then future excision once the infection is cleared and the swelling reduced.  The other option being trial of antibiotics upfront to see if this can clear the infection and decrease the involved area of swelling, such that when excision performed it can be through a small incision and if no further infection, the wound could be closed and avoid packing.  Patient would have difficulty accessing this portion of his neck to perform the wound care, and his wife is currently out of town.  After discussing the options, patient decided on a trial of antibiotics.  Patient instructed that if this infection appears to be worsening or not improving on the antibiotics then we be be forced to proceed to drainage and packing.  Follow-up after the antibiotic course completed.  I will be out of town at this time so he can follow-up with 1 of my partners.  If worsening such as developing fevers or worse pain then he may need an ER visit.     Matti Franco MD         Instructions         Again, thank you for allowing me to participate in the care of your patient.        Sincerely,        Olvin Pablo MD

## 2020-02-28 NOTE — PATIENT INSTRUCTIONS
Follow up with me in 4 weeks.   You can remove the packing Saturday or Sunday.  Then just protect your clothes and use a panty shield and attach to your shirt so that the drainage is not getting on your clothes.     SKIN AND SUBCUTANEOUS SURGERY DISCHARGE INSTRUCTIONS  DR. LM WILSON    1. You may resume your regular diet when you feel you are ready to. DO NOT drink alcoholic beverages for 24 hours or while you are taking prescription medication.    2. Limit your activities for the first 48 hours. Gradually, increase them as tolerated. You may use stairs. I encourage you to walk as tolerated.     3. You will have some discomfort at the incision sites. This is expected. This should improve over the next 2-3 days. Ice and pain medication will help with this pain. Use prescribed pain medication as instructed.    4. Bruising and mild swelling is normal after surgery. The area below and around the incision(s) will be hard and elevated. This is normal. I call it the healing ridge. This will resolve slowly over the next several months. If you feel the pain is increasing and cannot explain it by increasing activity please call us at [phone].    5. The dressing will often have some blood on it. You may shower 24 hours after surgery. Clean gently over incision site. If clear plastic covering or steri-strip comes off and there is still some bleeding or drainage then cover with gauze or band-aid. If no bleeding, there is no reason to cover site. If you were given an abdominal binder at time of surgery it may be removed after 24 hours after surgery. You may continue to wear it however for comfort. I suggest  you wear an old t-shirt under the abdominal binder for a more comfortable wear.    6. Avoid Aspirin for the first 72 hours after the procedure. This medication may increase the tendency to bleed.    7. Use the following medications (in addition to your normal meds) as shown:  a. Percocet 5 mg 1-2 every 6 hours as  needed for severe pain. This contains 325 mg of Tylenol (acetaminophen) per tablet.  Please do not take more than 4 grams of Tylenol (acetaminophen) per day. For example, you may take 1 Percocet and 1 Tylenol, or 2 Percocet and no Tylenol, or 2 Tylenol and no Percocet every 6 hours.  b. Tylenol (acetaminophen) 500 mg every 6 hours as needed for mild pain. Do not take more than 1000 mg every 6 hours. (see above).  c. Motrin (ibuprofen) 200-800 mg every 6 hours as needed for mild to moderate pain. Take with food.     8. Notify Dr. Pablo's clinic at [phone] if:    Your discomfort is not relieved by your pain medication.    You have signs of infection such as temperature above 100.5 degrees orally, chills, or increasing daily discomfort.    Incision site is becoming more red and/or there is purulent drainage.    You have questions or concerns.    9. Please call [phone] to schedule a follow up appointment in about 2 weeks.  If you have not already been given one.     10. When taking narcotics (pain medication more than Tylenol [acetaminophen] and Motrin [ibuprofen]) it is important to keep your stools soft to avoid constipation and pain with straining. This is best done by drinking fluids (non-alcoholic and non-caffeinated) and taking a stool softener (i.e. Metamucil or milk of magnesia). You may be able to use non-narcotics for pain relief especially by the 3rd post- operative day. Tylenol (acetaminophen) 500 mg every 6 hours and/or Motrin (ibuprofen) 200-800 mg every 6 hours. Please do not take more than 4 grams of Tylenol (acetaminophen) per day. Remember your Percocet does have Tylenol (acetaminophen) already in it. Please take Motrin (ibuprofen) with food to help protect the stomach. If you have a history of stomach ulcers or stomach problems, do not take Motrin (ibuprofen).     11. Do not drive or operate heavy machinery for 24 hours after surgery or when taking narcotics. You may resume driving  when feel that you can safely avoid an accident and are not taking narcotics. This is usually 5 to 7 days after surgery. You should not be alone for 24 hours after surgery.    12. Have milk of magnesia available at home so that when you take the pain medications you take 1-2 teaspoons a day,  to help reduce problems with constipation.

## 2020-02-28 NOTE — PROGRESS NOTES
"Risks explained including bleeding, infection, scar and recurrence.    Patient with an infected right posterior neck sebaceous cyst.   Feeling better since had been drained. But the opening is very small.   Will do better to open length of the cavity and remove small amount of skin with it.      After sterile prep with betadine the area was infiltrated with local.  An incision was made from the entire length of the cavity and some excess skin removed.  The oil was sopped out and then packed with gauze soaked in the lidocaine and appeared to be a cyst with mucus material.    /73   Pulse 88   Ht 1.753 m (5' 9\")   Wt (!) 215.5 kg (475 lb)   BMI 70.15 kg/m      Procedure  Preop dx:  Infected sebaceous cyst   Post op dx:  Same  Procedure:  3  cm incision of abscess and removal of cyst contents.   After sterile prep with betadine the area was infiltrated with local.  An incision was made from the entire length of the cavity and some excess skin removed.  The oil was sopped out and then packed with gauze soaked in the lidocaine and appeared to be a cyst with mucus material.    Anesthesia:  2% lidocaine  with epinephrine   Est. blood loss: 4 cc  Patient tolerated procedure well.  Hemostasis obtained with pressure.  Follow up with me in 4 weeks.   Patient is on brilenta     Ovlin Pablo MD        Office Visit     2/19/2020  [facility] Lit Franco, Matti Lim MD   Surgery   Ruptured sebaceous cyst   Dx   Consult     Reason for Visit    Progress Notes     Expand All Collapse All    Patient seen in consultation for infected cyst by Olvin Hartmann     HPI:  Patient is a 54 year old male  with complaints of infected cyst  The patient noticed the symptoms about 1 to 1-1/2 weeks ago.    Has a history of a known skin cyst here for many years  No previous infections that he can recall  No fevers.  He has noticed some drainage  nothing makes the episode better.  Area is tender  He also has a similar cyst on " the left neck though this is currently not infected or painful.  This is also been there for many years        Review Of Systems     Skin: as above  Ears/Nose/Throat: negative  Respiratory: Shortness of breath  Cardiovascular: negative  Gastrointestinal: negative  Genitourinary: negative  Musculoskeletal: negative  Neurologic: negative  Hematologic/Lymphatic/Immunologic: negative  Endocrine: negative        Past Medical History        Past Medical History:   Diagnosis Date     Abscess       Coronary artery disease       JEREL to proximal LAD on 4/30/19     Dyslipidemia       Hypertension       NSTEMI (non-ST elevated myocardial infarction) (H) 04/30/2019     JEREL to proximal LAD     Obesity       Sleep apnea       NEVER BEEN DIAGNOSED            Past Surgical History         Past Surgical History:   Procedure Laterality Date     CORONARY ANGIOGRAPHY ADULT ORDER   04/30/2019     JEREL to proximal LAD     CV HEART CATHETERIZATION WITH POSSIBLE INTERVENTION N/A 4/30/2019     JEREL to proximal LAD     EXCISE LESION TRUNK   5/11/2012     Procedure:EXCISE LESION TRUNK; excision back cyst; Surgeon:ELENA LEE; Location[facility]     ORTHOPEDIC SURGERY         >15 on right hand  / 1on left            Social History   Social History            Socioeconomic History     Marital status:        Spouse name: Not on file     Number of children: Not on file     Years of education: Not on file     Highest education level: Not on file   Occupational History     Not on file   Social Needs     Financial resource strain: Not on file     Food insecurity:       Worry: Not on file       Inability: Not on file     Transportation needs:       Medical: Not on file       Non-medical: Not on file   Tobacco Use     Smoking status: Never Smoker     Smokeless tobacco: Never Used   Substance and Sexual Activity     Alcohol use: Yes       Comment: 5 drinks/YEAR     Drug use: No     Sexual activity: Not on file   Lifestyle     Physical  activity:       Days per week: Not on file       Minutes per session: Not on file     Stress: Not on file   Relationships     Social connections:       Talks on phone: Not on file       Gets together: Not on file       Attends Tenriism service: Not on file       Active member of club or organization: Not on file       Attends meetings of clubs or organizations: Not on file       Relationship status: Not on file     Intimate partner violence:       Fear of current or ex partner: Not on file       Emotionally abused: Not on file       Physically abused: Not on file       Forced sexual activity: Not on file   Other Topics Concern     Parent/sibling w/ CABG, MI or angioplasty before 65F 55M? No   Social History Narrative     Not on file            Current Outpatient Prescriptions          Current Outpatient Medications   Medication Sig Dispense Refill     cephALEXin 500 MG PO capsule Take 1 capsule (500 mg) by mouth 4 times daily for 10 days 40 capsule 0     acetaminophen (TYLENOL) 325 MG tablet Take 975 mg by mouth daily with Ibuprofen         atorvastatin (LIPITOR) 40 MG tablet Take 1 tablet (40 mg) by mouth daily 90 tablet 3     calcium carbonate 600 mg-vitamin D 400 units (CALTRATE) 600-400 MG-UNIT per tablet Take 1 tablet by mouth daily         furosemide (LASIX) 40 MG tablet Take 1 tablet (40 mg) by mouth daily 90 tablet 3     lisinopril (PRINIVIL/ZESTRIL) 20 MG tablet Take 1 tablet (20 mg) by mouth daily 90 tablet 3     metoprolol succinate ER (TOPROL-XL) 50 MG 24 hr tablet Take 1 tablet (50 mg) by mouth daily 90 tablet 2     nitroGLYcerin (NITROSTAT) 0.4 MG sublingual tablet For chest pain place 1 tablet under the tongue every 5 minutes for 3 doses. If symptoms persist 5 minutes after 1st dose call 911. 30 tablet 0     NONFORMULARY Take 1 tablet by mouth 2 times daily -  CIRCULEG - combination nutritional supplement to improve circulation         order for DME Equipment being ordered: Walker ()  Treatment  "Diagnosis: morbid obesity 1 Device 0     spironolactone (ALDACTONE) 25 MG tablet Take 1 tablet (25 mg) by mouth daily 90 tablet 3     ticagrelor (BRILINTA) 90 MG tablet Take 1 tablet (90 mg) by mouth 2 times daily 180 tablet 3     vitamin D3 (CHOLECALCIFEROL) 1000 units (25 mcg) tablet Take 2,000 Units by mouth daily                Medications and history reviewed     Physical exam:  Vitals: /88   Pulse 136   Ht 1.753 m (5' 9\")   Wt (!) 218.2 kg (481 lb)   BMI 71.03 kg/m    BMI= Body mass index is 71.03 kg/m .     Constitutional: healthy, alert and no distress  Head: positive findings: Right neck in postauricular to suboccipital area with large ruptured sebaceous cyst.  There is some central fluctuance.  The entire area of indurated tissue approximately 8 cm.  Some central erythema, but I do not see any surrounding or spreading cellulitis.  No current drainage, but I see a small pinpoint opening currently scabbed over where there likely was previously  Left side of the neck in similar location there is a approximately 2 cm round likely sebaceous cyst no signs of infection currently, nontender  Cardiovascular: negative, PMI normal. No lifts, heaves, or thrills. RRR. No murmurs, clicks gallops or rub  Respiratory: negative, Percussion normal. Good diaphragmatic excursion. Lungs clear  Gastrointestinal: Abdomen soft, non-tender. BS normal. No masses, organomegaly, positive findings: obese  : Deferred  Musculoskeletal: Some deformity to right hand and fingers  Skin: See above  Psychiatric: mentation appears normal and affect normal/bright  Hematologic/Lymphatic/Immunologic: Normal cervical lymph nodes  Patient able to get up on table with mild difficulty. Uses walker        Assessment:       ICD-10-CM     1. Ruptured sebaceous cyst L72.0 cephALEXin 500 MG PO capsule      Plan: Large area involved by this ruptured sebaceous cyst, likely would require operating room for excision though with active infection " formal excision not preferred at this time.  One option is small incision for drainage of the area, this would require wound packing, with then future excision once the infection is cleared and the swelling reduced.  The other option being trial of antibiotics upfront to see if this can clear the infection and decrease the involved area of swelling, such that when excision performed it can be through a small incision and if no further infection, the wound could be closed and avoid packing.  Patient would have difficulty accessing this portion of his neck to perform the wound care, and his wife is currently out of town.  After discussing the options, patient decided on a trial of antibiotics.  Patient instructed that if this infection appears to be worsening or not improving on the antibiotics then we be be forced to proceed to drainage and packing.  Follow-up after the antibiotic course completed.  I will be out of town at this time so he can follow-up with 1 of my partners.  If worsening such as developing fevers or worse pain then he may need an ER visit.     Matti Franco MD         Instructions

## 2020-05-08 DIAGNOSIS — I25.5 ISCHEMIC CARDIOMYOPATHY: ICD-10-CM

## 2020-05-08 RX ORDER — SPIRONOLACTONE 25 MG/1
TABLET ORAL
Qty: 90 TABLET | Refills: 1 | Status: SHIPPED | OUTPATIENT
Start: 2020-05-08 | End: 2020-11-13

## 2020-05-12 ENCOUNTER — TELEPHONE (OUTPATIENT)
Dept: CARDIOLOGY | Facility: CLINIC | Age: 55
End: 2020-05-12

## 2020-05-12 NOTE — TELEPHONE ENCOUNTER
[facility] Call Center    Phone Message    May a detailed message be left on voicemail: yes     Reason for Call: Medication Refill Request    Has the patient contacted the pharmacy for the refill? Yes   Name of medication being requested: atorvastatin (LIPITOR) 40 MG tablet   Provider who prescribed the medication: Dr Chiang  Pharmacy: [facility] medication is needed: ASAP        Name of medication being requested: furosemide (LASIX) 40 MG tablet   Provider who prescribed the medication: Dr Chiang  Pharmacy: [facility] medication is needed: ASAP           Action Taken: Message routed to:  [facility] ([facility] Cardiology    Travel Screening: Not Applicable

## 2020-05-13 DIAGNOSIS — I25.5 ISCHEMIC CARDIOMYOPATHY: ICD-10-CM

## 2020-05-13 DIAGNOSIS — I25.110 CORONARY ARTERY DISEASE INVOLVING NATIVE CORONARY ARTERY OF NATIVE HEART WITH UNSTABLE ANGINA PECTORIS (H): ICD-10-CM

## 2020-05-13 RX ORDER — ATORVASTATIN CALCIUM 40 MG/1
40 TABLET, FILM COATED ORAL DAILY
Qty: 90 TABLET | Refills: 0 | Status: SHIPPED | OUTPATIENT
Start: 2020-05-13 | End: 2020-07-16

## 2020-05-13 RX ORDER — FUROSEMIDE 40 MG
40 TABLET ORAL DAILY
Qty: 90 TABLET | Refills: 0 | Status: SHIPPED | OUTPATIENT
Start: 2020-05-13 | End: 2020-07-16

## 2020-05-13 NOTE — TELEPHONE ENCOUNTER
Signed Prescriptions:                        Disp   Refills    atorvastatin (LIPITOR) 40 MG tablet        90 tab*0        Sig: Take 1 tablet (40 mg) by mouth daily  Authorizing Provider: SATURNINO THOMPSON  Ordering User: TRACIE BUTT    furosemide (LASIX) 40 MG tablet            90 tab*0        Sig: Take 1 tablet (40 mg) by mouth daily  Authorizing Provider: SATURNINO THOMPSON  Ordering User: TRACIE BUTT    Rx filled pre refill protocol.  Patient has an upcoming appointment with Dr. Lla.    Tracie Butt RN

## 2020-05-13 NOTE — TELEPHONE ENCOUNTER
Pending Prescriptions:                       Disp   Refills    atorvastatin (LIPITOR) 40 MG tablet       90 tab*3            Sig: Take 1 tablet (40 mg) by mouth daily    furosemide (LASIX) 40 MG tablet           90 tab*3            Sig: Take 1 tablet (40 mg) by mouth daily    Last Visit 6/20/2019 w/Hali  Last Filled 2/7/2020  Quantity 90  Req. Received 5/13/2020  DESI CookA

## 2020-06-16 ENCOUNTER — TELEPHONE (OUTPATIENT)
Dept: CARDIOLOGY | Facility: CLINIC | Age: 55
End: 2020-06-16

## 2020-06-16 NOTE — TELEPHONE ENCOUNTER
Left a message for patient stating his 6/18 appointment with Dr. Lal had to be rescheduled to 6/25 at 10:00. Asked patient to call back to confirm.  VINOD Cook

## 2020-06-22 ENCOUNTER — MYC MEDICAL ADVICE (OUTPATIENT)
Dept: CARDIOLOGY | Facility: CLINIC | Age: 55
End: 2020-06-22

## 2020-06-22 DIAGNOSIS — I25.5 ISCHEMIC CARDIOMYOPATHY: Primary | ICD-10-CM

## 2020-06-26 ENCOUNTER — TELEPHONE (OUTPATIENT)
Dept: CARDIOLOGY | Facility: CLINIC | Age: 55
End: 2020-06-26

## 2020-06-26 NOTE — TELEPHONE ENCOUNTER
Patient was making Reference to [place] appointment was not needed with Esvin Mott MD.Patient will attend July visits with echo, and Esvin Mott MD.Mery Askew L.P.N.

## 2020-06-26 NOTE — TELEPHONE ENCOUNTER
[facility] Call Center    Phone Message    May a detailed message be left on voicemail: yes     Reason for Call: Other: Pt is not seeing the DrAdarsh For the E visit but will discuss with him about the echo when he comes in to have the echo. PLease reach out to pt to discuss     Action Taken: Message routed to:  [facility] ([facility] Cardio    Travel Screening: Not Applicable

## 2020-07-16 ENCOUNTER — OFFICE VISIT (OUTPATIENT)
Dept: CARDIOLOGY | Facility: CLINIC | Age: 55
End: 2020-07-16
Payer: COMMERCIAL

## 2020-07-16 ENCOUNTER — MYC MEDICAL ADVICE (OUTPATIENT)
Dept: CARDIOLOGY | Facility: CLINIC | Age: 55
End: 2020-07-16

## 2020-07-16 ENCOUNTER — ANCILLARY PROCEDURE (OUTPATIENT)
Dept: CARDIOLOGY | Facility: CLINIC | Age: 55
End: 2020-07-16
Attending: INTERNAL MEDICINE
Payer: COMMERCIAL

## 2020-07-16 VITALS
HEART RATE: 97 BPM | SYSTOLIC BLOOD PRESSURE: 133 MMHG | WEIGHT: 315 LBS | BODY MASS INDEX: 73.39 KG/M2 | DIASTOLIC BLOOD PRESSURE: 82 MMHG

## 2020-07-16 DIAGNOSIS — I50.22 CHRONIC SYSTOLIC CONGESTIVE HEART FAILURE (H): Primary | ICD-10-CM

## 2020-07-16 DIAGNOSIS — I25.5 ISCHEMIC CARDIOMYOPATHY: ICD-10-CM

## 2020-07-16 DIAGNOSIS — I25.110 CORONARY ARTERY DISEASE INVOLVING NATIVE CORONARY ARTERY OF NATIVE HEART WITH UNSTABLE ANGINA PECTORIS (H): ICD-10-CM

## 2020-07-16 DIAGNOSIS — I25.10 CORONARY ARTERY DISEASE INVOLVING NATIVE HEART WITHOUT ANGINA PECTORIS, UNSPECIFIED VESSEL OR LESION TYPE: ICD-10-CM

## 2020-07-16 DIAGNOSIS — E78.5 DYSLIPIDEMIA: ICD-10-CM

## 2020-07-16 DIAGNOSIS — I25.5 ISCHEMIC CARDIOMYOPATHY: Primary | ICD-10-CM

## 2020-07-16 DIAGNOSIS — I21.4 NSTEMI (NON-ST ELEVATED MYOCARDIAL INFARCTION) (H): ICD-10-CM

## 2020-07-16 PROCEDURE — 40000264 ZZHC STATISTIC IV PUSH SINGLE INITIAL SUBSTANCE: Performed by: INTERNAL MEDICINE

## 2020-07-16 PROCEDURE — 93306 TTE W/DOPPLER COMPLETE: CPT | Mod: GC | Performed by: INTERNAL MEDICINE

## 2020-07-16 PROCEDURE — 99214 OFFICE O/P EST MOD 30 MIN: CPT | Mod: 25 | Performed by: INTERNAL MEDICINE

## 2020-07-16 RX ORDER — ASPIRIN 81 MG/1
81 TABLET ORAL DAILY
Qty: 90 TABLET | Refills: 3
Start: 2020-07-16

## 2020-07-16 RX ORDER — ATORVASTATIN CALCIUM 40 MG/1
40 TABLET, FILM COATED ORAL DAILY
Qty: 90 TABLET | Refills: 3 | Status: SHIPPED | OUTPATIENT
Start: 2020-07-16 | End: 2021-07-20

## 2020-07-16 RX ORDER — MULTIPLE VITAMINS W/ MINERALS TAB 9MG-400MCG
2 TAB ORAL DAILY
COMMUNITY

## 2020-07-16 RX ORDER — FUROSEMIDE 40 MG
40 TABLET ORAL DAILY
Qty: 90 TABLET | Refills: 3 | Status: SHIPPED | OUTPATIENT
Start: 2020-07-16 | End: 2021-07-21

## 2020-07-16 RX ORDER — LISINOPRIL 10 MG/1
10 TABLET ORAL DAILY
Qty: 90 TABLET | Refills: 3 | Status: SHIPPED | OUTPATIENT
Start: 2020-07-16 | End: 2021-07-21

## 2020-07-16 RX ORDER — METOPROLOL SUCCINATE 50 MG/1
50 TABLET, EXTENDED RELEASE ORAL DAILY
Qty: 90 TABLET | Refills: 3 | Status: SHIPPED | OUTPATIENT
Start: 2020-07-16 | End: 2020-08-11

## 2020-07-16 RX ADMIN — Medication 3 ML: at 09:30

## 2020-07-16 NOTE — LETTER
7/16/2020      RE: Sarbjit Tran  [address] [address]       Dear Colleague,    Thank you for the opportunity to participate in the care of your patient, Sarbjit Tran, at the [facility] HEART AT [facility]. Please see a copy of my visit note below.    CARDIOLOGY CLINIC FOLLOW UP    HPI: Sarbjit Tran is a 53 year old male, being seen today for recheck of NSTEMI in the LAD territory with PCI and EF of 40-45% with anteroapical/septal hypo/akinesis.  He is doing well and he has been doing cardiac rehab.  He gets dizzy occasionally.  Patient denies chest pain, dyspnea (exertional or rest), palpitations, LE edema, orthopnea, PND, or syncope. He has no complaints about his medications and has been able to take them without obvious side effects      PAST MEDICAL HISTORY:  Past Medical History:   Diagnosis Date     Abscess      Coronary artery disease     JEREL to proximal LAD on 4/30/19     Dyslipidemia      Hypertension      NSTEMI (non-ST elevated myocardial infarction) (H) 04/30/2019    JEREL to proximal LAD     Obesity      Sleep apnea     NEVER BEEN DIAGNOSED       CURRENT MEDICATIONS:  Current Outpatient Medications   Medication Sig Dispense Refill     acetaminophen (TYLENOL) 325 MG tablet Take 975 mg by mouth daily with Ibuprofen       atorvastatin (LIPITOR) 40 MG tablet Take 1 tablet (40 mg) by mouth daily 90 tablet 0     calcium carbonate 600 mg-vitamin D 400 units (CALTRATE) 600-400 MG-UNIT per tablet Take 1 tablet by mouth daily       furosemide (LASIX) 40 MG tablet Take 1 tablet (40 mg) by mouth daily 90 tablet 0     lisinopril (PRINIVIL/ZESTRIL) 20 MG tablet Take 1 tablet (20 mg) by mouth daily 90 tablet 3     metoprolol succinate ER (TOPROL-XL) 50 MG 24 hr tablet Take 1 tablet (50 mg) by mouth daily 90 tablet 2     multivitamin w/minerals (MULTI-VITAMIN) tablet Take 2 tablets by mouth daily        NONFORMULARY Take 1 tablet by mouth 2 times daily -  CIRCULEG - combination nutritional supplement to improve circulation       order for DME Equipment being ordered: Walker ()  Treatment Diagnosis: morbid obesity 1 Device 0     spironolactone (ALDACTONE) 25 MG tablet TAKE 1 TABLET(25 MG) BY MOUTH DAILY 90 tablet 1     vitamin D3 (CHOLECALCIFEROL) 1000 units (25 mcg) tablet Take 2,000 Units by mouth daily       nitroGLYcerin (NITROSTAT) 0.4 MG sublingual tablet For chest pain place 1 tablet under the tongue every 5 minutes for 3 doses. If symptoms persist 5 minutes after 1st dose call 911. (Patient not taking: Reported on 7/16/2020) 30 tablet 0     ticagrelor (BRILINTA) 90 MG tablet Take 1 tablet (90 mg) by mouth 2 times daily (Patient not taking: Reported on 7/16/2020) 180 tablet 3       PAST SURGICAL HISTORY:  Past Surgical History:   Procedure Laterality Date     CORONARY ANGIOGRAPHY ADULT ORDER  04/30/2019    JEREL to proximal LAD     CV HEART CATHETERIZATION WITH POSSIBLE INTERVENTION N/A 4/30/2019    JEREL to proximal LAD     EXCISE LESION TRUNK  5/11/2012    Procedure:EXCISE LESION TRUNK; excision back cyst; Surgeon:ELENA LEE; Location:[facility]     ORTHOPEDIC SURGERY      >15 on right hand  / 1on left       ALLERGIES  No Known Allergies    FAMILY HX:  Family History   Problem Relation Age of Onset     Hyperlipidemia Father         on statins     Coronary Artery Disease Paternal Grandfather         CABG       SOCIAL HX:  Social History     Socioeconomic History     Marital status:      Spouse name: None     Number of children: None     Years of education: None     Highest education level: None   Occupational History     None   Social Needs     Financial resource strain: None     Food insecurity:     Worry: None     Inability: None     Transportation needs:     Medical: None     Non-medical: None   Tobacco Use     Smoking status: Never Smoker     Smokeless tobacco: Never Used   Substance and  Sexual Activity     Alcohol use: Yes     Comment: 5 drinks/YEAR     Drug use: No     Sexual activity: None   Lifestyle     Physical activity:     Days per week: None     Minutes per session: None     Stress: None   Relationships     Social connections:     Talks on phone: None     Gets together: None     Attends Muslim service: None     Active member of club or organization: None     Attends meetings of clubs or organizations: None     Relationship status: None     Intimate partner violence:     Fear of current or ex partner: None     Emotionally abused: None     Physically abused: None     Forced sexual activity: None   Other Topics Concern     Parent/sibling w/ CABG, MI or angioplasty before 65F 55M? No   Social History Narrative     None       ROS:  Constitutional: No fever, chills, or sweats. No weight gain/loss.   ENT: No visual disturbance, ear ache, epistaxis, sore throat.   Allergies/Immunologic: Negative.   Respiratory: No cough, hemoptysis.   Cardiovascular: As per HPI.   GI: No nausea, vomiting, hematemesis, melena, or hematochezia.   : No urinary frequency, dysuria, or hematuria.   Integument: Negative.   Psychiatric: Negative.   Neuro: Negative.   Endocrinology: Negative.   Musculoskeletal: No myalgia.    VITAL SIGNS:  /82 (BP Location: Right arm, Patient Position: Chair, Cuff Size: Adult Large)   Pulse 97   Wt (!) 225.4 kg (497 lb)   BMI 73.39 kg/m    Body mass index is 73.39 kg/m .  Wt Readings from Last 2 Encounters:   07/16/20 (!) 225.4 kg (497 lb)   02/28/20 (!) 215.5 kg (475 lb)       PHYSICAL EXAM  Sarbjit Tran is a 53 year old male in no acute distress.  HEENT: Unremarkable.  Neck: JVP normal.  Carotids +4/4 bilaterally without bruits.  Lungs: CTA.  Cor: RRR. Normal S1 and S2.  No murmur, rub, or gallop.  PMI in Lf 5th ICS.  Abd: Soft, nontender, nondistended.  NABS.  No pulsatile mass.  Extremities: No C/C/E.  Pulses +4/4 symmetric in upper and lower extremities.  Neuro:  Grossly intact.    LABS    Lab Results   Component Value Date    WBC 8.5 05/01/2019     Lab Results   Component Value Date    RBC 4.06 05/01/2019     Lab Results   Component Value Date    HGB 12.1 05/01/2019     Lab Results   Component Value Date    HCT 37.2 05/01/2019     No components found for: MCT  Lab Results   Component Value Date    MCV 92 05/01/2019     Lab Results   Component Value Date    MCH 29.8 05/01/2019     Lab Results   Component Value Date    MCHC 32.5 05/01/2019     Lab Results   Component Value Date    RDW 13.9 05/01/2019     Lab Results   Component Value Date     05/01/2019      Recent Labs   Lab Test 05/10/19 05/03/19  0521 05/02/19  1250    138 139   POTASSIUM 4.6 4.0 3.8   CHLORIDE 100 103 104   CO2 24 30 29   ANIONGAP  --  5 6   * 126* 112*   BUN 23 16 15   CR 1.00 0.95 0.86   DENG 9.7 8.6 9.1     Recent Labs   Lab Test 05/01/19  0550 05/01/19  0014   CHOL 163 161   HDL 36* 40   LDL 91 104*   TRIG 178* 85        ASSESSMENT AND PLAN:  HTN -  Hypertension adequately controlled. Cut down lisinipril in  Half to evaluate lightheadedness.  Lipids - Hyperlipidemia adequately controlled. Continue current regimen.  CAD - Patient's CAD adequately managed. Stop DAPT continue only ASA 81 mg daily  CHF- Continue  Lasix and Spironolactone Chem 7 to check K in one month or early August    Recheck:as needed      Please do not hesitate to contact me if you have any questions/concerns.     Sincerely,     Esvin Mott MD

## 2020-07-16 NOTE — PROGRESS NOTES
CARDIOLOGY CLINIC FOLLOW UP    HPI: Sarbjit Tran is a 53 year old male, being seen today for recheck of NSTEMI in the LAD territory with PCI and EF of 40-45% with anteroapical/septal hypo/akinesis.  He is doing well and he has been doing cardiac rehab.  He gets dizzy occasionally.  Patient denies chest pain, dyspnea (exertional or rest), palpitations, LE edema, orthopnea, PND, or syncope. He has no complaints about his medications and has been able to take them without obvious side effects      PAST MEDICAL HISTORY:  Past Medical History:   Diagnosis Date     Abscess      Coronary artery disease     JEREL to proximal LAD on 4/30/19     Dyslipidemia      Hypertension      NSTEMI (non-ST elevated myocardial infarction) (H) 04/30/2019    JEREL to proximal LAD     Obesity      Sleep apnea     NEVER BEEN DIAGNOSED       CURRENT MEDICATIONS:  Current Outpatient Medications   Medication Sig Dispense Refill     acetaminophen (TYLENOL) 325 MG tablet Take 975 mg by mouth daily with Ibuprofen       atorvastatin (LIPITOR) 40 MG tablet Take 1 tablet (40 mg) by mouth daily 90 tablet 0     calcium carbonate 600 mg-vitamin D 400 units (CALTRATE) 600-400 MG-UNIT per tablet Take 1 tablet by mouth daily       furosemide (LASIX) 40 MG tablet Take 1 tablet (40 mg) by mouth daily 90 tablet 0     lisinopril (PRINIVIL/ZESTRIL) 20 MG tablet Take 1 tablet (20 mg) by mouth daily 90 tablet 3     metoprolol succinate ER (TOPROL-XL) 50 MG 24 hr tablet Take 1 tablet (50 mg) by mouth daily 90 tablet 2     multivitamin w/minerals (MULTI-VITAMIN) tablet Take 2 tablets by mouth daily       NONFORMULARY Take 1 tablet by mouth 2 times daily -  CIRCULEG - combination nutritional supplement to improve circulation       order for DME Equipment being ordered: Walker ()  Treatment Diagnosis: morbid obesity 1 Device 0     spironolactone (ALDACTONE) 25 MG tablet TAKE 1 TABLET(25 MG) BY MOUTH DAILY 90 tablet 1     vitamin D3 (CHOLECALCIFEROL) 1000 units  (25 mcg) tablet Take 2,000 Units by mouth daily       nitroGLYcerin (NITROSTAT) 0.4 MG sublingual tablet For chest pain place 1 tablet under the tongue every 5 minutes for 3 doses. If symptoms persist 5 minutes after 1st dose call 911. (Patient not taking: Reported on 7/16/2020) 30 tablet 0     ticagrelor (BRILINTA) 90 MG tablet Take 1 tablet (90 mg) by mouth 2 times daily (Patient not taking: Reported on 7/16/2020) 180 tablet 3       PAST SURGICAL HISTORY:  Past Surgical History:   Procedure Laterality Date     CORONARY ANGIOGRAPHY ADULT ORDER  04/30/2019    JEREL to proximal LAD     CV HEART CATHETERIZATION WITH POSSIBLE INTERVENTION N/A 4/30/2019    JEREL to proximal LAD     EXCISE LESION TRUNK  5/11/2012    Procedure:EXCISE LESION TRUNK; excision back cyst; Surgeon:ELENA LEE; Location:[facility]     ORTHOPEDIC SURGERY      >15 on right hand  / 1on left       ALLERGIES  No Known Allergies    FAMILY HX:  Family History   Problem Relation Age of Onset     Hyperlipidemia Father         on statins     Coronary Artery Disease Paternal Grandfather         CABG       SOCIAL HX:  Social History     Socioeconomic History     Marital status:      Spouse name: None     Number of children: None     Years of education: None     Highest education level: None   Occupational History     None   Social Needs     Financial resource strain: None     Food insecurity:     Worry: None     Inability: None     Transportation needs:     Medical: None     Non-medical: None   Tobacco Use     Smoking status: Never Smoker     Smokeless tobacco: Never Used   Substance and Sexual Activity     Alcohol use: Yes     Comment: 5 drinks/YEAR     Drug use: No     Sexual activity: None   Lifestyle     Physical activity:     Days per week: None     Minutes per session: None     Stress: None   Relationships     Social connections:     Talks on phone: None     Gets together: None     Attends Quaker service: None     Active member of club or  organization: None     Attends meetings of clubs or organizations: None     Relationship status: None     Intimate partner violence:     Fear of current or ex partner: None     Emotionally abused: None     Physically abused: None     Forced sexual activity: None   Other Topics Concern     Parent/sibling w/ CABG, MI or angioplasty before 65F 55M? No   Social History Narrative     None       ROS:  Constitutional: No fever, chills, or sweats. No weight gain/loss.   ENT: No visual disturbance, ear ache, epistaxis, sore throat.   Allergies/Immunologic: Negative.   Respiratory: No cough, hemoptysis.   Cardiovascular: As per HPI.   GI: No nausea, vomiting, hematemesis, melena, or hematochezia.   : No urinary frequency, dysuria, or hematuria.   Integument: Negative.   Psychiatric: Negative.   Neuro: Negative.   Endocrinology: Negative.   Musculoskeletal: No myalgia.    VITAL SIGNS:  /82 (BP Location: Right arm, Patient Position: Chair, Cuff Size: Adult Large)   Pulse 97   Wt (!) 225.4 kg (497 lb)   BMI 73.39 kg/m    Body mass index is 73.39 kg/m .  Wt Readings from Last 2 Encounters:   07/16/20 (!) 225.4 kg (497 lb)   02/28/20 (!) 215.5 kg (475 lb)       PHYSICAL EXAM  Sarbjit Tran is a 53 year old male in no acute distress.  HEENT: Unremarkable.  Neck: JVP normal.  Carotids +4/4 bilaterally without bruits.  Lungs: CTA.  Cor: RRR. Normal S1 and S2.  No murmur, rub, or gallop.  PMI in Lf 5th ICS.  Abd: Soft, nontender, nondistended.  NABS.  No pulsatile mass.  Extremities: No C/C/E.  Pulses +4/4 symmetric in upper and lower extremities.  Neuro: Grossly intact.    LABS    Lab Results   Component Value Date    WBC 8.5 05/01/2019     Lab Results   Component Value Date    RBC 4.06 05/01/2019     Lab Results   Component Value Date    HGB 12.1 05/01/2019     Lab Results   Component Value Date    HCT 37.2 05/01/2019     No components found for: MCT  Lab Results   Component Value Date    MCV 92 05/01/2019     Lab  Results   Component Value Date    MCH 29.8 05/01/2019     Lab Results   Component Value Date    MCHC 32.5 05/01/2019     Lab Results   Component Value Date    RDW 13.9 05/01/2019     Lab Results   Component Value Date     05/01/2019      Recent Labs   Lab Test 05/10/19 05/03/19  0521 05/02/19  1250    138 139   POTASSIUM 4.6 4.0 3.8   CHLORIDE 100 103 104   CO2 24 30 29   ANIONGAP  --  5 6   * 126* 112*   BUN 23 16 15   CR 1.00 0.95 0.86   DENG 9.7 8.6 9.1     Recent Labs   Lab Test 05/01/19  0550 05/01/19  0014   CHOL 163 161   HDL 36* 40   LDL 91 104*   TRIG 178* 85        ASSESSMENT AND PLAN:  HTN -  Hypertension adequately controlled. Cut down lisinipril in  Half to evaluate lightheadedness.  Lipids - Hyperlipidemia adequately controlled. Continue current regimen.  CAD - Patient's CAD adequately managed. Stop DAPT continue only ASA 81 mg daily  CHF- Continue  Lasix and Spironolactone Chem 7 to check K in one month or early August    Recheck:as needed

## 2020-07-16 NOTE — PATIENT INSTRUCTIONS
Thank you for coming to the [facility] [place]; please note the following instructions:    1. Decrease lisinopril to 10 mg daily.  A new prescription was sent to your pharmacy    2.  STOP Brilinta    3.  Continue to take aspirin 81 mg daily    4.  Fasting labs to check cholesterol, kidney function and electrolytes in August.  You will need to make a lab only appointment    2. Dr. Esvin Mott would like you to return for a cardiac follow up in 1 year  (July 2021).  We will contact you regarding your appointment when the time draws closer or you may call [phone] to arrange an appointment.  Mean while, if you should have any questions or concerns regarding your heart health, please contact us.  Thank you for choosing the [facility].                      If you have any questions regarding your visit please contact your care team:     Cardiology  Telephone Number   Tracie BISWAS, RN  Shaye HERNANDEZ, RN   Griselda PRADO, RMA  Grazyna REDMAN, GEORGINA SONG, LPN   [phone] (select option 1)    *After hours: [phone]   For Scheduling Appts:     [phone] (select option 1)    *After hours: [phone]     For the Device Clinic (Pacemakers and ICD's)  RN's :  Crystal Mejias   During business hours: [phone]    *After business hours:  [phone] (select option 4)      Normal test result notifications will be released via [employer] or mailed within 7 business days.  All other test results, will be communicated via telephone once reviewed by your cardiologist.    If you need a medication refill please contact your pharmacy.  Please allow 3 business days for your refill to be completed.    As always, thank you for trusting us with your health care needs!

## 2020-08-05 ENCOUNTER — MYC MEDICAL ADVICE (OUTPATIENT)
Dept: SURGERY | Facility: CLINIC | Age: 55
End: 2020-08-05

## 2020-08-09 DIAGNOSIS — I25.110 CORONARY ARTERY DISEASE INVOLVING NATIVE CORONARY ARTERY OF NATIVE HEART WITH UNSTABLE ANGINA PECTORIS (H): ICD-10-CM

## 2020-08-09 DIAGNOSIS — I25.5 ISCHEMIC CARDIOMYOPATHY: ICD-10-CM

## 2020-08-09 DIAGNOSIS — I25.10 CORONARY ARTERY DISEASE INVOLVING NATIVE HEART WITHOUT ANGINA PECTORIS, UNSPECIFIED VESSEL OR LESION TYPE: ICD-10-CM

## 2020-08-09 DIAGNOSIS — I21.4 NSTEMI (NON-ST ELEVATED MYOCARDIAL INFARCTION) (H): ICD-10-CM

## 2020-08-10 ENCOUNTER — TELEPHONE (OUTPATIENT)
Dept: SURGERY | Facility: CLINIC | Age: 55
End: 2020-08-10

## 2020-08-10 NOTE — TELEPHONE ENCOUNTER
Can you call this patient and see if he is needing a procedure or is just coming to talk to me.     If he needs a procedure at the clinic then we need 60 minutes   Thanks   Olvin Pablo

## 2020-08-11 RX ORDER — METOPROLOL SUCCINATE 50 MG/1
TABLET, EXTENDED RELEASE ORAL
Qty: 90 TABLET | Refills: 3 | Status: SHIPPED | OUTPATIENT
Start: 2020-08-11 | End: 2021-11-11

## 2020-08-11 RX ORDER — LISINOPRIL 20 MG/1
TABLET ORAL
Qty: 90 TABLET | Refills: 3 | OUTPATIENT
Start: 2020-08-11

## 2020-08-18 ENCOUNTER — OFFICE VISIT (OUTPATIENT)
Dept: SURGERY | Facility: CLINIC | Age: 55
End: 2020-08-18
Payer: COMMERCIAL

## 2020-08-18 VITALS
BODY MASS INDEX: 73.69 KG/M2 | HEART RATE: 116 BPM | DIASTOLIC BLOOD PRESSURE: 80 MMHG | SYSTOLIC BLOOD PRESSURE: 176 MMHG | WEIGHT: 315 LBS

## 2020-08-18 DIAGNOSIS — T81.89XA SURGICAL WOUND, NON HEALING, INITIAL ENCOUNTER: ICD-10-CM

## 2020-08-18 PROCEDURE — 88304 TISSUE EXAM BY PATHOLOGIST: CPT | Performed by: SURGERY

## 2020-08-18 PROCEDURE — 11423 EXC H-F-NK-SP B9+MARG 2.1-3: CPT | Mod: 51 | Performed by: SURGERY

## 2020-08-18 PROCEDURE — 12042 INTMD RPR N-HF/GENIT2.6-7.5: CPT | Performed by: SURGERY

## 2020-08-18 RX ORDER — SULFAMETHOXAZOLE/TRIMETHOPRIM 800-160 MG
1 TABLET ORAL 2 TIMES DAILY
Qty: 18 TABLET | Refills: 1 | Status: SHIPPED | OUTPATIENT
Start: 2020-08-18 | End: 2021-09-21

## 2020-08-18 NOTE — PATIENT INSTRUCTIONS
Follow up with me in 2 weeks. For suture removal and path report          SKIN AND SUBCUTANEOUS SURGERY DISCHARGE INSTRUCTIONS  DR. LM WILSON    1. You may resume your regular diet when you feel you are ready to. DO NOT drink alcoholic beverages for 24 hours or while you are taking prescription medication.    2. Limit your activities for the first 48 hours. Gradually, increase them as tolerated. You may use stairs. I encourage you to walk as tolerated.    3. You will have some discomfort at the incision sites. This is expected. This should improve over the next 2-3 days. Ice and pain medication will help with this pain. Use prescribed pain medication as instructed.    4. Bruising and mild swelling is normal after surgery. The area below and around the incision(s) will be hard and elevated. This is normal. I call it the healing ridge. This will resolve slowly over the next several months. If you feel the pain is increasing and cannot explain it by increasing activity please call us at [phone].    5. The dressing will often have some blood on it. You may shower 24 hours after surgery. Clean gently over incision site. If clear plastic covering or steri-strip comes off and there is still some bleeding or drainage then cover with gauze or band-aid. If no bleeding, there is no reason to cover site. If you were given an abdominal binder at time of surgery it may be removed after 24 hours after surgery. You may continue to wear it however for comfort. I suggest  you wear an old t-shirt under the abdominal binder for a more comfortable wear.    6. Avoid Aspirin for the first 72 hours after the procedure. This medication may increase the tendency to bleed.    7. Use the following medications (in addition to your normal meds) as shown:  a. Percocet 5 mg 1-2 every 6 hours as needed for severe pain. This contains 325 mg of Tylenol (acetaminophen) per tablet.  Please do not take more than 4 grams of Tylenol  (acetaminophen) per day. For example, you may take 1 Percocet and 1 Tylenol, or 2 Percocet and no Tylenol, or 2 Tylenol and no Percocet every 6 hours.  b. Tylenol (acetaminophen) 500 mg every 6 hours as needed for mild pain. Do not take more than 1000 mg every 6 hours. (see above).  c. Motrin (ibuprofen) 200-800 mg every 6 hours as needed for mild to moderate pain. Take with food.     8. Notify Dr. Pablo's clinic at [phone] if:    Your discomfort is not relieved by your pain medication.    You have signs of infection such as temperature above 100.5 degrees orally, chills, or increasing daily discomfort.    Incision site is becoming more red and/or there is purulent drainage.    You have questions or concerns.    9. Please call [phone] to schedule a follow up appointment in about 2 weeks.  If you have not already been given one.     10. When taking narcotics (pain medication more than Tylenol [acetaminophen] and Motrin [ibuprofen]) it is important to keep your stools soft to avoid constipation and pain with straining. This is best done by drinking fluids (non-alcoholic and non-caffeinated) and taking a stool softener (i.e. Metamucil or milk of magnesia). You may be able to use non-narcotics for pain relief especially by the 3rd post- operative day. Tylenol (acetaminophen) 500 mg every 6 hours and/or Motrin (ibuprofen) 200-800 mg every 6 hours. Please do not take more than 4 grams of Tylenol (acetaminophen) per day. Remember your Percocet does have Tylenol (acetaminophen) already in it. Please take Motrin (ibuprofen) with food to help protect the stomach. If you have a history of stomach ulcers or stomach problems, do not take Motrin (ibuprofen).     11. Do not drive or operate heavy machinery for 24 hours after surgery or when taking narcotics. You may resume driving when feel that you can safely avoid an accident and are not taking narcotics. This is usually 5 to 7 days after surgery. You should  not be alone for 24 hours after surgery.    12. Have milk of magnesia available at home so that when you take the pain medications you take 1-2 teaspoons a day,  to help reduce problems with constipation.

## 2020-08-18 NOTE — PROGRESS NOTES
Patient has a nonhealing  Area after removing some of the infected sebaceous cyst.   Has been since february.   No pain just not filling in.    Looks like scar or just the back wall of the cyst.     Risks explained including bleeding, infection, scar and recurrence.  If gets infected will need to open.   Area is on the right neck.  No evidence of infection    After sterile prep with betadine the area was infiltrated with local.  An elliptical incision was made to removed skin edges with the scar flat area.     After removal the skin was undermined and multiple interrupted 3-0 vicryl sutures was used to bring the skin edges together.  Then the skin was closed with the same suture in a running fashion.  The wound was reinforced with 3-0 nylon interrupted vertical mattress suture time 3  BP (!) 176/80   Pulse 116   Wt (!) 226.3 kg (499 lb)   BMI 73.69 kg/m      Procedure  Preop dx:  Non healing of part of the wound 2.5 cm in size.   Post op dx:  Same  Procedure:  3 cm exesion of non healing wound and complex closure of the 3 cm wound.   After sterile prep with betadine the area was infiltrated with local.  An elliptical incision was made to removed skin edges with the scar flat area.     After removal the skin was undermined and multiple interrupted 3-0 vicryl sutures was used to bring the skin edges together.  Then the skin was closed with the same suture in a running fashion.  The wound was reinforced with 3-0 nylon interrupted vertical mattress suture time 3  Anesthesia:  2% lidocaine  with epinephrine   Est. blood loss: 3 cc  Patient tolerated procedure well.  Hemostasis obtained with pressure.  Follow up with me in 2 weeks. For suture removal and path report          SKIN AND SUBCUTANEOUS SURGERY DISCHARGE INSTRUCTIONS  DR. LM WILSON    1. You may resume your regular diet when you feel you are ready to. DO NOT drink alcoholic beverages for 24 hours or while you are taking prescription medication.    2.  Limit your activities for the first 48 hours. Gradually, increase them as tolerated. You may use stairs. I encourage you to walk as tolerated.     3. You will have some discomfort at the incision sites. This is expected. This should improve over the next 2-3 days. Ice and pain medication will help with this pain. Use prescribed pain medication as instructed.    4. Bruising and mild swelling is normal after surgery. The area below and around the incision(s) will be hard and elevated. This is normal. I call it the healing ridge. This will resolve slowly over the next several months. If you feel the pain is increasing and cannot explain it by increasing activity please call us at [phone].    5. The dressing will often have some blood on it. You may shower 24 hours after surgery. Clean gently over incision site. If clear plastic covering or steri-strip comes off and there is still some bleeding or drainage then cover with gauze or band-aid. If no bleeding, there is no reason to cover site. If you were given an abdominal binder at time of surgery it may be removed after 24 hours after surgery. You may continue to wear it however for comfort. I suggest  you wear an old t-shirt under the abdominal binder for a more comfortable wear.    6. Avoid Aspirin for the first 72 hours after the procedure. This medication may increase the tendency to bleed.    7. Use the following medications (in addition to your normal meds) as shown:  a. Percocet 5 mg 1-2 every 6 hours as needed for severe pain. This contains 325 mg of Tylenol (acetaminophen) per tablet.  Please do not take more than 4 grams of Tylenol (acetaminophen) per day. For example, you may take 1 Percocet and 1 Tylenol, or 2 Percocet and no Tylenol, or 2 Tylenol and no Percocet every 6 hours.  b. Tylenol (acetaminophen) 500 mg every 6 hours as needed for mild pain. Do not take more than 1000 mg every 6 hours. (see above).  c. Motrin (ibuprofen) 200-800 mg every 6  hours as needed for mild to moderate pain. Take with food.     8. Notify Dr. Pablo's clinic at [phone] if:    Your discomfort is not relieved by your pain medication.    You have signs of infection such as temperature above 100.5 degrees orally, chills, or increasing daily discomfort.    Incision site is becoming more red and/or there is purulent drainage.    You have questions or concerns.    9. Please call [phone] to schedule a follow up appointment in about 2 weeks.  If you have not already been given one.     10. When taking narcotics (pain medication more than Tylenol [acetaminophen] and Motrin [ibuprofen]) it is important to keep your stools soft to avoid constipation and pain with straining. This is best done by drinking fluids (non-alcoholic and non-caffeinated) and taking a stool softener (i.e. Metamucil or milk of magnesia). You may be able to use non-narcotics for pain relief especially by the 3rd post- operative day. Tylenol (acetaminophen) 500 mg every 6 hours and/or Motrin (ibuprofen) 200-800 mg every 6 hours. Please do not take more than 4 grams of Tylenol (acetaminophen) per day. Remember your Percocet does have Tylenol (acetaminophen) already in it. Please take Motrin (ibuprofen) with food to help protect the stomach. If you have a history of stomach ulcers or stomach problems, do not take Motrin (ibuprofen).     11. Do not drive or operate heavy machinery for 24 hours after surgery or when taking narcotics. You may resume driving when feel that you can safely avoid an accident and are not taking narcotics. This is usually 5 to 7 days after surgery. You should not be alone for 24 hours after surgery.    12. Have milk of magnesia available at home so that when you take the pain medications you take 1-2 teaspoons a day,  to help reduce problems with constipation.

## 2020-08-18 NOTE — LETTER
8/18/2020         RE: Sarbjit Tran  [address] [facility] [id number]        Dear Colleague,    Thank you for referring your patient, Sarbjit Tran, to the [facility]. Please see a copy of my visit note below.    Patient has a nonhealing  Area after removing some of the infected sebaceous cyst.   Has been since february.   No pain just not filling in.    Looks like scar or just the back wall of the cyst.     Risks explained including bleeding, infection, scar and recurrence.  If gets infected will need to open.   Area is on the right neck.  No evidence of infection    After sterile prep with betadine the area was infiltrated with local.  An elliptical incision was made to removed skin edges with the scar flat area.     After removal the skin was undermined and multiple interrupted 3-0 vicryl sutures was used to bring the skin edges together.  Then the skin was closed with the same suture in a running fashion.  The wound was reinforced with 3-0 nylon interrupted vertical mattress suture time 3  BP (!) 176/80   Pulse 116   Wt (!) 226.3 kg (499 lb)   BMI 73.69 kg/m      Procedure  Preop dx:  Non healing of part of the wound 2.5 cm in size.   Post op dx:  Same  Procedure:  3 cm exesion of non healing wound and complex closure of the 3 cm wound.   After sterile prep with betadine the area was infiltrated with local.  An elliptical incision was made to removed skin edges with the scar flat area.     After removal the skin was undermined and multiple interrupted 3-0 vicryl sutures was used to bring the skin edges together.  Then the skin was closed with the same suture in a running fashion.  The wound was reinforced with 3-0 nylon interrupted vertical mattress suture time 3  Anesthesia:  2% lidocaine  with epinephrine   Est. blood loss: 3 cc  Patient tolerated procedure well.  Hemostasis obtained with pressure.  Follow up with me in 2 weeks. For suture removal and path  report          SKIN AND SUBCUTANEOUS SURGERY DISCHARGE INSTRUCTIONS  DR. LM WILSON    1. You may resume your regular diet when you feel you are ready to. DO NOT drink alcoholic beverages for 24 hours or while you are taking prescription medication.    2. Limit your activities for the first 48 hours. Gradually, increase them as tolerated. You may use stairs. I encourage you to walk as tolerated.     3. You will have some discomfort at the incision sites. This is expected. This should improve over the next 2-3 days. Ice and pain medication will help with this pain. Use prescribed pain medication as instructed.    4. Bruising and mild swelling is normal after surgery. The area below and around the incision(s) will be hard and elevated. This is normal. I call it the healing ridge. This will resolve slowly over the next several months. If you feel the pain is increasing and cannot explain it by increasing activity please call us at [phone].    5. The dressing will often have some blood on it. You may shower 24 hours after surgery. Clean gently over incision site. If clear plastic covering or steri-strip comes off and there is still some bleeding or drainage then cover with gauze or band-aid. If no bleeding, there is no reason to cover site. If you were given an abdominal binder at time of surgery it may be removed after 24 hours after surgery. You may continue to wear it however for comfort. I suggest  you wear an old t-shirt under the abdominal binder for a more comfortable wear.    6. Avoid Aspirin for the first 72 hours after the procedure. This medication may increase the tendency to bleed.    7. Use the following medications (in addition to your normal meds) as shown:  a. Percocet 5 mg 1-2 every 6 hours as needed for severe pain. This contains 325 mg of Tylenol (acetaminophen) per tablet.  Please do not take more than 4 grams of Tylenol (acetaminophen) per day. For example, you may take 1 Percocet and  1 Tylenol, or 2 Percocet and no Tylenol, or 2 Tylenol and no Percocet every 6 hours.  b. Tylenol (acetaminophen) 500 mg every 6 hours as needed for mild pain. Do not take more than 1000 mg every 6 hours. (see above).  c. Motrin (ibuprofen) 200-800 mg every 6 hours as needed for mild to moderate pain. Take with food.     8. Notify Dr. Pablo's clinic at [phone] if:    Your discomfort is not relieved by your pain medication.    You have signs of infection such as temperature above 100.5 degrees orally, chills, or increasing daily discomfort.    Incision site is becoming more red and/or there is purulent drainage.    You have questions or concerns.    9. Please call [phone] to schedule a follow up appointment in about 2 weeks.  If you have not already been given one.     10. When taking narcotics (pain medication more than Tylenol [acetaminophen] and Motrin [ibuprofen]) it is important to keep your stools soft to avoid constipation and pain with straining. This is best done by drinking fluids (non-alcoholic and non-caffeinated) and taking a stool softener (i.e. Metamucil or milk of magnesia). You may be able to use non-narcotics for pain relief especially by the 3rd post- operative day. Tylenol (acetaminophen) 500 mg every 6 hours and/or Motrin (ibuprofen) 200-800 mg every 6 hours. Please do not take more than 4 grams of Tylenol (acetaminophen) per day. Remember your Percocet does have Tylenol (acetaminophen) already in it. Please take Motrin (ibuprofen) with food to help protect the stomach. If you have a history of stomach ulcers or stomach problems, do not take Motrin (ibuprofen).     11. Do not drive or operate heavy machinery for 24 hours after surgery or when taking narcotics. You may resume driving when feel that you can safely avoid an accident and are not taking narcotics. This is usually 5 to 7 days after surgery. You should not be alone for 24 hours after surgery.    12. Have milk of  magnesia available at home so that when you take the pain medications you take 1-2 teaspoons a day,  to help reduce problems with constipation.          Again, thank you for allowing me to participate in the care of your patient.        Sincerely,        Olvin Pablo MD

## 2020-08-21 LAB — COPATH REPORT: NORMAL

## 2020-08-26 ENCOUNTER — TELEPHONE (OUTPATIENT)
Dept: CARDIOLOGY | Facility: CLINIC | Age: 55
End: 2020-08-26

## 2020-08-26 DIAGNOSIS — I25.5 ISCHEMIC CARDIOMYOPATHY: ICD-10-CM

## 2020-08-26 DIAGNOSIS — I25.110 CORONARY ARTERY DISEASE INVOLVING NATIVE CORONARY ARTERY OF NATIVE HEART WITH UNSTABLE ANGINA PECTORIS (H): Primary | ICD-10-CM

## 2020-08-26 NOTE — TELEPHONE ENCOUNTER
Date: 8/26/2020    Time of Call: 8:44 AM     Diagnosis:  CHF, CAD     [ VORB ] Ordering provider: Dr. Lal  Order: fasting lipids and BMP     Order received by: Tracie Butt RN       Follow-up/additional notes: orders placed.  Please call patient to schedule.

## 2020-08-31 DIAGNOSIS — I25.110 CORONARY ARTERY DISEASE INVOLVING NATIVE CORONARY ARTERY OF NATIVE HEART WITH UNSTABLE ANGINA PECTORIS (H): ICD-10-CM

## 2020-08-31 DIAGNOSIS — I25.5 ISCHEMIC CARDIOMYOPATHY: ICD-10-CM

## 2020-08-31 DIAGNOSIS — E78.5 DYSLIPIDEMIA: ICD-10-CM

## 2020-08-31 LAB
ANION GAP SERPL CALCULATED.3IONS-SCNC: 10 MMOL/L (ref 3–14)
BUN SERPL-MCNC: 27 MG/DL (ref 7–30)
CALCIUM SERPL-MCNC: 9.1 MG/DL (ref 8.5–10.1)
CHLORIDE SERPL-SCNC: 107 MMOL/L (ref 94–109)
CHOLEST SERPL-MCNC: 125 MG/DL
CO2 SERPL-SCNC: 20 MMOL/L (ref 20–32)
CREAT SERPL-MCNC: 1.04 MG/DL (ref 0.66–1.25)
GFR SERPL CREATININE-BSD FRML MDRD: 80 ML/MIN/{1.73_M2}
GLUCOSE SERPL-MCNC: 115 MG/DL (ref 70–99)
HDLC SERPL-MCNC: 42 MG/DL
LDLC SERPL CALC-MCNC: 58 MG/DL
NONHDLC SERPL-MCNC: 83 MG/DL
POTASSIUM SERPL-SCNC: 4.1 MMOL/L (ref 3.4–5.3)
SODIUM SERPL-SCNC: 137 MMOL/L (ref 133–144)
TRIGL SERPL-MCNC: 127 MG/DL

## 2020-08-31 PROCEDURE — 80061 LIPID PANEL: CPT | Performed by: FAMILY MEDICINE

## 2020-08-31 PROCEDURE — 80048 BASIC METABOLIC PNL TOTAL CA: CPT | Performed by: FAMILY MEDICINE

## 2020-08-31 PROCEDURE — 36415 COLL VENOUS BLD VENIPUNCTURE: CPT | Performed by: FAMILY MEDICINE

## 2020-11-13 DIAGNOSIS — I25.5 ISCHEMIC CARDIOMYOPATHY: ICD-10-CM

## 2020-11-13 RX ORDER — SPIRONOLACTONE 25 MG/1
TABLET ORAL
Qty: 90 TABLET | Refills: 3 | Status: SHIPPED | OUTPATIENT
Start: 2020-11-13 | End: 2021-12-01

## 2020-12-13 ENCOUNTER — HEALTH MAINTENANCE LETTER (OUTPATIENT)
Age: 55
End: 2020-12-13

## 2021-04-17 ENCOUNTER — HEALTH MAINTENANCE LETTER (OUTPATIENT)
Age: 56
End: 2021-04-17

## 2021-04-23 ENCOUNTER — IMMUNIZATION (OUTPATIENT)
Dept: PEDIATRICS | Facility: CLINIC | Age: 56
End: 2021-04-23
Payer: COMMERCIAL

## 2021-04-23 PROCEDURE — 0001A PR COVID VAC PFIZER DIL RECON 30 MCG/0.3 ML IM: CPT

## 2021-04-23 PROCEDURE — 91300 PR COVID VAC PFIZER DIL RECON 30 MCG/0.3 ML IM: CPT

## 2021-05-14 ENCOUNTER — IMMUNIZATION (OUTPATIENT)
Dept: PEDIATRICS | Facility: CLINIC | Age: 56
End: 2021-05-14
Attending: INTERNAL MEDICINE
Payer: COMMERCIAL

## 2021-05-14 PROCEDURE — 91300 PR COVID VAC PFIZER DIL RECON 30 MCG/0.3 ML IM: CPT

## 2021-05-14 PROCEDURE — 0002A PR COVID VAC PFIZER DIL RECON 30 MCG/0.3 ML IM: CPT

## 2021-07-19 DIAGNOSIS — I25.110 CORONARY ARTERY DISEASE INVOLVING NATIVE CORONARY ARTERY OF NATIVE HEART WITH UNSTABLE ANGINA PECTORIS (H): ICD-10-CM

## 2021-07-20 RX ORDER — ATORVASTATIN CALCIUM 40 MG/1
TABLET, FILM COATED ORAL
Qty: 90 TABLET | Refills: 3 | Status: SHIPPED | OUTPATIENT
Start: 2021-07-20 | End: 2021-09-22

## 2021-07-21 ENCOUNTER — MYC MEDICAL ADVICE (OUTPATIENT)
Dept: CARDIOLOGY | Facility: CLINIC | Age: 56
End: 2021-07-21

## 2021-07-21 DIAGNOSIS — I25.110 CORONARY ARTERY DISEASE INVOLVING NATIVE CORONARY ARTERY OF NATIVE HEART WITH UNSTABLE ANGINA PECTORIS (H): ICD-10-CM

## 2021-07-21 DIAGNOSIS — I25.5 ISCHEMIC CARDIOMYOPATHY: ICD-10-CM

## 2021-07-21 RX ORDER — FUROSEMIDE 40 MG
40 TABLET ORAL DAILY
Qty: 90 TABLET | Refills: 1 | Status: SHIPPED | OUTPATIENT
Start: 2021-07-21 | End: 2021-08-09

## 2021-07-21 RX ORDER — LISINOPRIL 10 MG/1
10 TABLET ORAL DAILY
Qty: 90 TABLET | Refills: 1 | Status: SHIPPED | OUTPATIENT
Start: 2021-07-21 | End: 2021-08-02

## 2021-07-30 DIAGNOSIS — I25.5 ISCHEMIC CARDIOMYOPATHY: ICD-10-CM

## 2021-07-30 DIAGNOSIS — I25.110 CORONARY ARTERY DISEASE INVOLVING NATIVE CORONARY ARTERY OF NATIVE HEART WITH UNSTABLE ANGINA PECTORIS (H): ICD-10-CM

## 2021-08-02 RX ORDER — LISINOPRIL 10 MG/1
TABLET ORAL
Qty: 90 TABLET | Refills: 1 | Status: SHIPPED | OUTPATIENT
Start: 2021-08-02 | End: 2021-11-12

## 2021-08-02 NOTE — CONFIDENTIAL NOTE
Signed Prescriptions:                        Disp   Refills    lisinopril (ZESTRIL) 10 MG tablet          90 tab*1        Sig: TAKE 1 TABLET(10 MG) BY MOUTH DAILY  Authorizing Provider: SATURNINO THOMPSON  Ordering User: TRACIE BUTT    Patient is due for a visit, message left with patient in detail stating prescription has been filled and sent to preferred pharmacy but going forward patient will need to be seen in clinic and to call clinic [phone] option 1 to schedule.    Tracie Btut RN

## 2021-08-09 RX ORDER — FUROSEMIDE 40 MG
40 TABLET ORAL DAILY
Qty: 90 TABLET | Refills: 1 | Status: SHIPPED | OUTPATIENT
Start: 2021-08-09 | End: 2022-02-07

## 2021-09-03 NOTE — PROGRESS NOTES
"  [facility]  Cardiovascular Clinic Note      Referring Provider: No ref. provider found   Primary Care Provider: Linh Lopez     Patient Name: Sarbjit Tran   MRN: [id number]       History of Present Illness:  Sarbjit Tran is a 55 year old male with PMH notable for CAD s/p PCI LAD 4/2019, HFrEF (ischemic cardiomyopathy), HTN, HLD, and obesity, He presents for yearly follow up.    The patient was last seen 7/2020 with Dr. Mott. At that visit, he had no cardiovascular complaints.  He underwent echocardiogram which showed resolution of his distal wall motion abnormality.  His LDL was at goal and renal function/electrolytes were stable.     Since that point in time, the patient has continued to do well.  He was previously participating in working out at a gym, but stopped secondary to the COVID19 pandemic.  He is interested in returning to the gym, but has not yet done so.  He denies chest pain, SOB, LE swelling, and palpitations.  He has occasionally lightheadedness when he \"sits down too fast\" which lasts for a period of seconds and resolves spontaneously.  He sleeps each night in a recliner and denies orthopnea while doing so. He is compliant with all medications.    The patient does not routinely check his BP at home.  His weight is stable over the last year.    Pertinent Cardiac Medications:  40mg lasix daily  10mg lisinopril daily  40mg Lipitor daily  25mg spironolactone daily  50mg metoprolol XL daily  81mg ASA daily      Past Medical History:  Pertinent past medical history as above.      Past Surgical History:  Past Surgical History:   Procedure Laterality Date     CORONARY ANGIOGRAPHY ADULT ORDER  04/30/2019    JEREL to proximal LAD     CV HEART CATHETERIZATION WITH POSSIBLE INTERVENTION N/A 4/30/2019    JEREL to proximal LAD     EXCISE LESION TRUNK  5/11/2012    Procedure:EXCISE LESION TRUNK; excision back cyst; Surgeon:ELENA LEE; Location:[facility]     " ORTHOPEDIC SURGERY      >15 on right hand  / 1on left         Family History:  Family History   Problem Relation Age of Onset     Hyperlipidemia Father         on statins     Coronary Artery Disease Paternal Grandfather         CABG         Current Medications:  Current Outpatient Medications   Medication Sig Dispense Refill     acetaminophen (TYLENOL) 325 MG tablet Take 975 mg by mouth daily with Ibuprofen       aspirin 81 MG EC tablet Take 1 tablet (81 mg) by mouth daily 90 tablet 3     atorvastatin (LIPITOR) 40 MG tablet TAKE 1 TABLET(40 MG) BY MOUTH DAILY 90 tablet 3     calcium carbonate 600 mg-vitamin D 400 units (CALTRATE) 600-400 MG-UNIT per tablet Take 1 tablet by mouth daily       furosemide (LASIX) 40 MG tablet Take 1 tablet (40 mg) by mouth daily 90 tablet 1     lisinopril (ZESTRIL) 10 MG tablet TAKE 1 TABLET(10 MG) BY MOUTH DAILY 90 tablet 1     metoprolol succinate ER (TOPROL-XL) 50 MG 24 hr tablet TAKE 1 TABLET(50 MG) BY MOUTH DAILY 90 tablet 3     multivitamin w/minerals (MULTI-VITAMIN) tablet Take 2 tablets by mouth daily       spironolactone (ALDACTONE) 25 MG tablet TAKE 1 TABLET(25 MG) BY MOUTH DAILY 90 tablet 3     vitamin D3 (CHOLECALCIFEROL) 1000 units (25 mcg) tablet Take 2,000 Units by mouth daily       nitroGLYcerin (NITROSTAT) 0.4 MG sublingual tablet For chest pain place 1 tablet under the tongue every 5 minutes for 3 doses. If symptoms persist 5 minutes after 1st dose call 911. (Patient not taking: Reported on 7/16/2020) 30 tablet 0     NONFORMULARY Take 1 tablet by mouth 2 times daily -  CIRCULEG - combination nutritional supplement to improve circulation       order for DME Equipment being ordered: Walker ()  Treatment Diagnosis: morbid obesity 1 Device 0     sulfamethoxazole-trimethoprim (BACTRIM DS) 800-160 MG tablet Take 1 tablet by mouth 2 times daily 18 tablet 1         Social History:  Non contributory    Physical Exam:  /80 (BP Location: Left arm, Patient Position:  Chair, Cuff Size: Adult Large)   Pulse 100   Wt (!) 226.3 kg (499 lb)   SpO2 94%   BMI 73.69 kg/m    Body mass index is 73.69 kg/m .  Wt Readings from Last 2 Encounters:   09/21/21 (!) 226.3 kg (499 lb)   08/18/20 (!) 226.3 kg (499 lb)     Constitutional: no acute distress, pleasant and cooperative, appears overall well.  Eyes: sclera white, conjunctiva clear, without icterus or pallor   Cardiovascular: RRR. Normal S1/S2. Extremities with no edema  Respiratory: clear to auscultation bilaterally.  Diminished bilaterally  Gastrointestinal:  non distended  Musculoskeletal: normal muscle bulk and tone, joints   Skin: normal skin appearance without worrisome lesions.   Neurologic: AOx3, gait smooth and symmetric  Psychiatric: appropriate affect, eye contact, intact thought and speech      Laboratory Data:  LIPID RESULTS:  Recent Labs   Lab Test 08/31/20  1340 05/01/19  0550   CHOL 125 163   HDL 42 36*   LDL 58 91   TRIG 127 178*       CBC RESULTS:  Recent Labs   Lab Test 05/01/19  0550 04/30/19  1823   WBC 8.5 8.8   HGB 12.1* 13.0*   HCT 37.2* 39.9*    199       BMP RESULTS:  Recent Labs   Lab Test 08/31/20  1340 08/06/19  1340    139   POTASSIUM 4.1 3.7   CHLORIDE 107 106   CO2 20 21   ANIONGAP 10 12   * 125*   BUN 27 24   CR 1.04 0.90   DENG 9.1 9.7       A1C RESULTS:  Lab Results   Component Value Date    A1C 5.9 (H) 05/01/2019       Pertinent Procedures/Imaging:  Coronary Angiogram 4/30/2019:  NSTEMI with ongoing chest pain.  ELLIE 2 flow in LAD  PCI of mid LAD with JEREL, improved distal vessel to ELLIE 3 flow.   No compromise of large diagonal branch.    Echocardiogram 7/2020:  Technically difficult study.Extremely difficult acoustic windows despite the  use of contrast for endocardial border definition.  Global and regional left ventricular function is probably normal with an EF of  55-60%.  The right ventricle is difficult to assess. Likely normal in function.  This study was compared with the  study from 4/30/2019. The distal wall motion  abnormalities appear improved on limited views.      Assessment:  Sarbjit Tran is a 55 year old male with PMH notable for CAD s/p PCI LAD 4/2019, hx HFrEF (ischemic cardiomyopathy) - now likely recovered, HTN, HLD, and obesity. He presents for yearly follow up.    55 year old male with no cardiovascular complaints.  Difficult volume exam, but based upon exam/symptoms, euvolemic.        Plan:  # CAD s/p PCI to LAD 4/2019  # Hx of Ischemic Cardiomyopathy, HFrEF - EF now recovered  - 81ng aspirin indefinitely  - continue BB, ACE    # HTN  # HLD  LDL 58 8/2020. BP in clinic controlled.  - repeat fasting lipid panel in the next 1-2 weeks  - continue ACE, BB, spironolactone, lasix  - continue Lipitor      Follow-up: 1 year with cardiology MD.      A total of 16 minutes spent face to face with patient. An additional 10 minutes was spent providing coordination of care, documentation, and chart review.    Vannesa Baptiste PA-C  Interventional/Critical Care Cardiology  [phone]        CC  Patient Care Team:  Linh Lopez MD as PCP - General (Family Practice)  Olvin Hartmann MD as Assigned PCP  Esvin Mott MD as Assigned Heart and Vascular Provider  Olvin Pablo MD as Assigned Surgical Provider

## 2021-09-21 ENCOUNTER — LAB (OUTPATIENT)
Dept: LAB | Facility: CLINIC | Age: 56
End: 2021-09-21

## 2021-09-21 ENCOUNTER — OFFICE VISIT (OUTPATIENT)
Dept: CARDIOLOGY | Facility: CLINIC | Age: 56
End: 2021-09-21
Payer: COMMERCIAL

## 2021-09-21 VITALS
HEART RATE: 100 BPM | DIASTOLIC BLOOD PRESSURE: 80 MMHG | SYSTOLIC BLOOD PRESSURE: 120 MMHG | WEIGHT: 315 LBS | BODY MASS INDEX: 73.69 KG/M2 | OXYGEN SATURATION: 94 %

## 2021-09-21 DIAGNOSIS — I25.110 CORONARY ARTERY DISEASE INVOLVING NATIVE CORONARY ARTERY OF NATIVE HEART WITH UNSTABLE ANGINA PECTORIS (H): ICD-10-CM

## 2021-09-21 DIAGNOSIS — E78.5 DYSLIPIDEMIA: Primary | ICD-10-CM

## 2021-09-21 DIAGNOSIS — I25.10 CORONARY ARTERY DISEASE INVOLVING NATIVE HEART WITHOUT ANGINA PECTORIS, UNSPECIFIED VESSEL OR LESION TYPE: ICD-10-CM

## 2021-09-21 DIAGNOSIS — E78.5 DYSLIPIDEMIA: ICD-10-CM

## 2021-09-21 DIAGNOSIS — I10 ESSENTIAL HYPERTENSION: ICD-10-CM

## 2021-09-21 LAB
ANION GAP SERPL CALCULATED.3IONS-SCNC: 8 MMOL/L (ref 3–14)
BUN SERPL-MCNC: 21 MG/DL (ref 7–30)
CALCIUM SERPL-MCNC: 8.9 MG/DL (ref 8.5–10.1)
CHLORIDE BLD-SCNC: 106 MMOL/L (ref 94–109)
CHOLEST SERPL-MCNC: 179 MG/DL
CO2 SERPL-SCNC: 24 MMOL/L (ref 20–32)
CREAT SERPL-MCNC: 0.9 MG/DL (ref 0.66–1.25)
FASTING STATUS PATIENT QL REPORTED: YES
GFR SERPL CREATININE-BSD FRML MDRD: >90 ML/MIN/1.73M2
GLUCOSE BLD-MCNC: 132 MG/DL (ref 70–99)
HDLC SERPL-MCNC: 44 MG/DL
LDLC SERPL CALC-MCNC: 102 MG/DL
NONHDLC SERPL-MCNC: 135 MG/DL
POTASSIUM BLD-SCNC: 3.8 MMOL/L (ref 3.4–5.3)
SODIUM SERPL-SCNC: 138 MMOL/L (ref 133–144)
TRIGL SERPL-MCNC: 165 MG/DL

## 2021-09-21 PROCEDURE — 36415 COLL VENOUS BLD VENIPUNCTURE: CPT

## 2021-09-21 PROCEDURE — 80048 BASIC METABOLIC PNL TOTAL CA: CPT

## 2021-09-21 PROCEDURE — 80061 LIPID PANEL: CPT

## 2021-09-21 PROCEDURE — 99213 OFFICE O/P EST LOW 20 MIN: CPT | Performed by: PHYSICIAN ASSISTANT

## 2021-09-21 NOTE — NURSING NOTE
"Chief Complaint   Patient presents with     RECHECK     Annual follow up CAD.        Initial /80 (BP Location: Left arm, Patient Position: Chair, Cuff Size: Adult Large)   Pulse 100   Wt (!) 226.3 kg (499 lb)   SpO2 94%   BMI 73.69 kg/m   Estimated body mass index is 73.69 kg/m  as calculated from the following:    Height as of 2/28/20: 1.753 m (5' 9\").    Weight as of this encounter: 226.3 kg (499 lb)..  BP completed using cuff size: kevin Rosas MA  "

## 2021-09-21 NOTE — PATIENT INSTRUCTIONS
Thank you for coming to the [facility]; please note the following instructions:    1. Labs in the next 1-2 weeks. Please fast for these.  2. Follow up in approximately 1 year with Dr. Lorenz (gricel Muhammad).        If you have any questions regarding your visit please contact your care team:     Cardiology  Telephone Number   Tracie BISWAS, RN  Shaye HERNANDEZ, RN   Griselda PRADO, RMA  Grazyna REDMAN, RMA  Mery SONG, LPN   [phone] (option 1)   For scheduling appts:     [phone] (select option 1)       For the Device Clinic (Pacemakers and ICD's)  RN's :  Crystal Mejias   During business hours: [phone]    *After business hours:  [phone] (select option 4)      Normal test result notifications will be released via [employer] or mailed within 7 business days.  All other test results, will be communicated via telephone once reviewed by your cardiologist.    If you need a medication refill please contact your pharmacy.  Please allow 3 business days for your refill to be completed.    As always, thank you for trusting us with your health care needs!

## 2021-09-21 NOTE — LETTER
"9/21/2021      RE: Sarbjit Tran  [address] [facility] [address]       Dear Colleague,    Thank you for the opportunity to participate in the care of your patient, Sarbjit Tran, at the [facility] HEART CLIN[facility]. Please see a copy of my visit note below.      [facility] - [facility]  Cardiovascular Clinic Note      Referring Provider: No ref. provider found   Primary Care Provider: Linh Lopez     Patient Name: Sarbjit Tran   MRN: [id number]       History of Present Illness:  Sarbjit Tran is a 55 year old male with PMH notable for CAD s/p PCI LAD 4/2019, HFrEF (ischemic cardiomyopathy), HTN, HLD, and obesity, He presents for yearly follow up.    The patient was last seen 7/2020 with Dr. Mott. At that visit, he had no cardiovascular complaints.  He underwent echocardiogram which showed resolution of his distal wall motion abnormality.  His LDL was at goal and renal function/electrolytes were stable.     Since that point in time, the patient has continued to do well.  He was previously participating in working out at a gym, but stopped secondary to the COVID19 pandemic.  He is interested in returning to the gym, but has not yet done so.  He denies chest pain, SOB, LE swelling, and palpitations.  He has occasionally lightheadedness when he \"sits down too fast\" which lasts for a period of seconds and resolves spontaneously.  He sleeps each night in a recliner and denies orthopnea while doing so. He is compliant with all medications.    The patient does not routinely check his BP at home.  His weight is stable over the last year.    Pertinent Cardiac Medications:  40mg lasix daily  10mg lisinopril daily  40mg Lipitor daily  25mg spironolactone daily  50mg metoprolol XL daily  81mg ASA daily      Past Medical History:  Pertinent past medical history as above.      Past Surgical " History:  Past Surgical History:   Procedure Laterality Date     CORONARY ANGIOGRAPHY ADULT ORDER  04/30/2019    JEREL to proximal LAD     CV HEART CATHETERIZATION WITH POSSIBLE INTERVENTION N/A 4/30/2019    JEREL to proximal LAD     EXCISE LESION TRUNK  5/11/2012    Procedure:EXCISE LESION TRUNK; excision back cyst; Surgeon:ELENA LEE; Location:[facility]     ORTHOPEDIC SURGERY      >15 on right hand  / 1on left         Family History:  Family History   Problem Relation Age of Onset     Hyperlipidemia Father         on statins     Coronary Artery Disease Paternal Grandfather         CABG         Current Medications:  Current Outpatient Medications   Medication Sig Dispense Refill     acetaminophen (TYLENOL) 325 MG tablet Take 975 mg by mouth daily with Ibuprofen       aspirin 81 MG EC tablet Take 1 tablet (81 mg) by mouth daily 90 tablet 3     atorvastatin (LIPITOR) 40 MG tablet TAKE 1 TABLET(40 MG) BY MOUTH DAILY 90 tablet 3     calcium carbonate 600 mg-vitamin D 400 units (CALTRATE) 600-400 MG-UNIT per tablet Take 1 tablet by mouth daily       furosemide (LASIX) 40 MG tablet Take 1 tablet (40 mg) by mouth daily 90 tablet 1     lisinopril (ZESTRIL) 10 MG tablet TAKE 1 TABLET(10 MG) BY MOUTH DAILY 90 tablet 1     metoprolol succinate ER (TOPROL-XL) 50 MG 24 hr tablet TAKE 1 TABLET(50 MG) BY MOUTH DAILY 90 tablet 3     multivitamin w/minerals (MULTI-VITAMIN) tablet Take 2 tablets by mouth daily       spironolactone (ALDACTONE) 25 MG tablet TAKE 1 TABLET(25 MG) BY MOUTH DAILY 90 tablet 3     vitamin D3 (CHOLECALCIFEROL) 1000 units (25 mcg) tablet Take 2,000 Units by mouth daily       nitroGLYcerin (NITROSTAT) 0.4 MG sublingual tablet For chest pain place 1 tablet under the tongue every 5 minutes for 3 doses. If symptoms persist 5 minutes after 1st dose call 911. (Patient not taking: Reported on 7/16/2020) 30 tablet 0     NONFORMULARY Take 1 tablet by mouth 2 times daily -  CIRCULEG - combination nutritional  supplement to improve circulation       order for DME Equipment being ordered: Walker ()  Treatment Diagnosis: morbid obesity 1 Device 0     sulfamethoxazole-trimethoprim (BACTRIM DS) 800-160 MG tablet Take 1 tablet by mouth 2 times daily 18 tablet 1         Social History:  Non contributory    Physical Exam:  /80 (BP Location: Left arm, Patient Position: Chair, Cuff Size: Adult Large)   Pulse 100   Wt (!) 226.3 kg (499 lb)   SpO2 94%   BMI 73.69 kg/m    Body mass index is 73.69 kg/m .  Wt Readings from Last 2 Encounters:   09/21/21 (!) 226.3 kg (499 lb)   08/18/20 (!) 226.3 kg (499 lb)     Constitutional: no acute distress, pleasant and cooperative, appears overall well.  Eyes: sclera white, conjunctiva clear, without icterus or pallor   Cardiovascular: RRR. Normal S1/S2. Extremities with no edema  Respiratory: clear to auscultation bilaterally.  Diminished bilaterally  Gastrointestinal:  non distended  Musculoskeletal: normal muscle bulk and tone, joints   Skin: normal skin appearance without worrisome lesions.   Neurologic: AOx3, gait smooth and symmetric  Psychiatric: appropriate affect, eye contact, intact thought and speech      Laboratory Data:  LIPID RESULTS:  Recent Labs   Lab Test 08/31/20  1340 05/01/19  0550   CHOL 125 163   HDL 42 36*   LDL 58 91   TRIG 127 178*       CBC RESULTS:  Recent Labs   Lab Test 05/01/19  0550 04/30/19  1823   WBC 8.5 8.8   HGB 12.1* 13.0*   HCT 37.2* 39.9*    199       BMP RESULTS:  Recent Labs   Lab Test 08/31/20  1340 08/06/19  1340    139   POTASSIUM 4.1 3.7   CHLORIDE 107 106   CO2 20 21   ANIONGAP 10 12   * 125*   BUN 27 24   CR 1.04 0.90   DENG 9.1 9.7       A1C RESULTS:  Lab Results   Component Value Date    A1C 5.9 (H) 05/01/2019       Pertinent Procedures/Imaging:  Coronary Angiogram 4/30/2019:  NSTEMI with ongoing chest pain.  ELLIE 2 flow in LAD  PCI of mid LAD with JEREL, improved distal vessel to ELLIE 3 flow.   No compromise of large  diagonal branch.    Echocardiogram 7/2020:  Technically difficult study.Extremely difficult acoustic windows despite the  use of contrast for endocardial border definition.  Global and regional left ventricular function is probably normal with an EF of  55-60%.  The right ventricle is difficult to assess. Likely normal in function.  This study was compared with the study from 4/30/2019. The distal wall motion  abnormalities appear improved on limited views.      Assessment:  Sarbjit Tran is a 55 year old male with PMH notable for CAD s/p PCI LAD 4/2019, hx HFrEF (ischemic cardiomyopathy) - now likely recovered, HTN, HLD, and obesity. He presents for yearly follow up.    55 year old male with no cardiovascular complaints.  Difficult volume exam, but based upon exam/symptoms, euvolemic.        Plan:  # CAD s/p PCI to LAD 4/2019  # Hx of Ischemic Cardiomyopathy, HFrEF - EF now recovered  - 81ng aspirin indefinitely  - continue BB, ACE    # HTN  # HLD  LDL 58 8/2020. BP in clinic controlled.  - repeat fasting lipid panel in the next 1-2 weeks  - continue ACE, BB, spironolactone, lasix  - continue Lipitor      Follow-up: 1 year with cardiology MD.      A total of 16 minutes spent face to face with patient. An additional 10 minutes was spent providing coordination of care, documentation, and chart review.    Vannesa Baptiste PA-C  Interventional/Critical Care Cardiology  [phone]    CC  Patient Care Team:  Linh Lopez MD as PCP - General (Family Practice)  Olvin Hartmann MD as Assigned PCP  Esvin Mott MD as Assigned Heart and Vascular Provider  Olvin Pablo MD as Assigned Surgical Provider

## 2021-09-22 RX ORDER — ATORVASTATIN CALCIUM 80 MG/1
80 TABLET, FILM COATED ORAL DAILY
Qty: 90 TABLET | Refills: 1 | Status: SHIPPED | OUTPATIENT
Start: 2021-09-22 | End: 2022-06-01

## 2021-09-26 ENCOUNTER — HEALTH MAINTENANCE LETTER (OUTPATIENT)
Age: 56
End: 2021-09-26

## 2021-10-28 ENCOUNTER — DOCUMENTATION ONLY (OUTPATIENT)
Dept: OTHER | Facility: CLINIC | Age: 56
End: 2021-10-28

## 2021-11-11 DIAGNOSIS — I25.110 CORONARY ARTERY DISEASE INVOLVING NATIVE CORONARY ARTERY OF NATIVE HEART WITH UNSTABLE ANGINA PECTORIS (H): ICD-10-CM

## 2021-11-11 DIAGNOSIS — I25.10 CORONARY ARTERY DISEASE INVOLVING NATIVE HEART WITHOUT ANGINA PECTORIS, UNSPECIFIED VESSEL OR LESION TYPE: ICD-10-CM

## 2021-11-11 DIAGNOSIS — I21.4 NSTEMI (NON-ST ELEVATED MYOCARDIAL INFARCTION) (H): ICD-10-CM

## 2021-11-11 DIAGNOSIS — I25.5 ISCHEMIC CARDIOMYOPATHY: ICD-10-CM

## 2021-11-11 RX ORDER — METOPROLOL SUCCINATE 50 MG/1
50 TABLET, EXTENDED RELEASE ORAL DAILY
Qty: 90 TABLET | Refills: 3 | Status: SHIPPED | OUTPATIENT
Start: 2021-11-11 | End: 2022-12-06

## 2021-11-11 NOTE — CONFIDENTIAL NOTE
Signed Prescriptions:                        Disp   Refills    metoprolol succinate ER (TOPROL-XL) 50 MG *90 tab*3        Sig: Take 1 tablet (50 mg) by mouth daily  Authorizing Provider: CHHAYA PANIAGUA  Ordering User: TRACIE BUTT    Rx filled per refill protocol.  Tracie Butt RN

## 2021-11-11 NOTE — TELEPHONE ENCOUNTER
Received fax on 11/11/21 from  Pharmacy  requesting refill(s) for the following medication(s):    1.      Drug:  Metoprolol ER Succinate              Patient's Last Cardiology Appointment:  09/21/21   Patient's Next Cardiology Appointment:  annual      Refill encounter routed to fk card.  Mery Askew L.P.N.

## 2021-11-12 NOTE — TELEPHONE ENCOUNTER
LISINOPRIL 10MG TABLETS  Last Written Prescription Date:  8/2/2021  Last Fill Quantity: 90,   # refills: 1  Last Office Visit : 9/21/2021  Future Office visit:  None  Routing refill request to provider for review/approval because:  We do not fill medications for [facility]  Refer to clinic for review     Rere Perez RN  Central Triage Red Flags/Med Refills

## 2021-11-15 RX ORDER — LISINOPRIL 10 MG/1
10 TABLET ORAL DAILY
Qty: 90 TABLET | Refills: 3 | Status: SHIPPED | OUTPATIENT
Start: 2021-11-15 | End: 2022-12-06

## 2021-12-01 DIAGNOSIS — I25.5 ISCHEMIC CARDIOMYOPATHY: ICD-10-CM

## 2021-12-01 RX ORDER — SPIRONOLACTONE 25 MG/1
TABLET ORAL
Qty: 90 TABLET | Refills: 3 | Status: SHIPPED | OUTPATIENT
Start: 2021-12-01 | End: 2022-12-06

## 2022-02-06 DIAGNOSIS — I25.5 ISCHEMIC CARDIOMYOPATHY: ICD-10-CM

## 2022-02-07 RX ORDER — FUROSEMIDE 40 MG
TABLET ORAL
Qty: 90 TABLET | Refills: 1 | Status: SHIPPED | OUTPATIENT
Start: 2022-02-07 | End: 2022-10-04

## 2022-02-07 NOTE — CONFIDENTIAL NOTE
Signed Prescriptions:                        Disp   Refills    furosemide (LASIX) 40 MG tablet            90 tab*1        Sig: TAKE 1 TABLET(40 MG) BY MOUTH DAILY  Authorizing Provider: CHHAYA PANIAGUA  Ordering User: CHIP WALSH Comprehensive Metabolic Panel:  Sodium   Date Value Ref Range Status   09/21/2021 138 133 - 144 mmol/L Final   08/31/2020 137 133 - 144 mmol/L Final     Potassium   Date Value Ref Range Status   09/21/2021 3.8 3.4 - 5.3 mmol/L Final   08/31/2020 4.1 3.4 - 5.3 mmol/L Final     Chloride   Date Value Ref Range Status   09/21/2021 106 94 - 109 mmol/L Final   08/31/2020 107 94 - 109 mmol/L Final     Carbon Dioxide   Date Value Ref Range Status   08/31/2020 20 20 - 32 mmol/L Final     Carbon Dioxide (CO2)   Date Value Ref Range Status   09/21/2021 24 20 - 32 mmol/L Final     Anion Gap   Date Value Ref Range Status   09/21/2021 8 3 - 14 mmol/L Final   08/31/2020 10 3 - 14 mmol/L Final     Glucose   Date Value Ref Range Status   09/21/2021 132 (H) 70 - 99 mg/dL Final   08/31/2020 115 (H) 70 - 99 mg/dL Final     Comment:     Fasting specimen     Urea Nitrogen   Date Value Ref Range Status   09/21/2021 21 7 - 30 mg/dL Final   08/31/2020 27 7 - 30 mg/dL Final     Creatinine   Date Value Ref Range Status   09/21/2021 0.90 0.66 - 1.25 mg/dL Final   08/31/2020 1.04 0.66 - 1.25 mg/dL Final     GFR Estimate   Date Value Ref Range Status   09/21/2021 >90 >60 mL/min/1.73m2 Final     Comment:     As of July 11, 2021, eGFR is calculated by the CKD-EPI creatinine equation, without race adjustment. eGFR can be influenced by muscle mass, exercise, and diet. The reported eGFR is an estimation only and is only applicable if the renal function is stable.   08/31/2020 80 >60 mL/min/[1.73_m2] Final     Comment:     Non  GFR Calc  Starting 12/18/2018, serum creatinine based estimated GFR (eGFR) will be   calculated using the Chronic Kidney Disease Epidemiology Collaboration   (CKD-EPI)  equation.       Calcium   Date Value Ref Range Status   09/21/2021 8.9 8.5 - 10.1 mg/dL Final   08/31/2020 9.1 8.5 - 10.1 mg/dL Final     Rx filled per erfill protocol.    Tracie Butt, RN  Cardiology Care Coordinator  [facility]  [phone] option 1

## 2022-05-08 ENCOUNTER — HEALTH MAINTENANCE LETTER (OUTPATIENT)
Age: 57
End: 2022-05-08

## 2022-05-17 ENCOUNTER — TELEPHONE (OUTPATIENT)
Dept: CARDIOLOGY | Facility: CLINIC | Age: 57
End: 2022-05-17
Payer: COMMERCIAL

## 2022-05-17 NOTE — TELEPHONE ENCOUNTER
5/17 M/ for pt to schedule follow-up appointment with Dr. Lorenz in Sept, pt has been seen in [place] previously -EF

## 2022-05-26 ENCOUNTER — MYC MEDICAL ADVICE (OUTPATIENT)
Dept: CARDIOLOGY | Facility: CLINIC | Age: 57
End: 2022-05-26
Payer: COMMERCIAL

## 2022-05-26 ENCOUNTER — TELEPHONE (OUTPATIENT)
Dept: CARDIOLOGY | Facility: CLINIC | Age: 57
End: 2022-05-26
Payer: COMMERCIAL

## 2022-05-26 DIAGNOSIS — I25.10 CORONARY ARTERY DISEASE INVOLVING NATIVE HEART WITHOUT ANGINA PECTORIS, UNSPECIFIED VESSEL OR LESION TYPE: Primary | ICD-10-CM

## 2022-05-26 DIAGNOSIS — I25.110 CORONARY ARTERY DISEASE INVOLVING NATIVE CORONARY ARTERY OF NATIVE HEART WITH UNSTABLE ANGINA PECTORIS (H): ICD-10-CM

## 2022-06-01 ENCOUNTER — DOCUMENTATION ONLY (OUTPATIENT)
Dept: LAB | Facility: CLINIC | Age: 57
End: 2022-06-01
Payer: COMMERCIAL

## 2022-06-01 RX ORDER — ATORVASTATIN CALCIUM 80 MG/1
80 TABLET, FILM COATED ORAL DAILY
Qty: 90 TABLET | Refills: 0 | Status: SHIPPED | OUTPATIENT
Start: 2022-06-01 | End: 2022-10-04

## 2022-06-02 ENCOUNTER — LAB (OUTPATIENT)
Dept: LAB | Facility: CLINIC | Age: 57
End: 2022-06-02
Payer: COMMERCIAL

## 2022-06-02 ENCOUNTER — DOCUMENTATION ONLY (OUTPATIENT)
Dept: LAB | Facility: CLINIC | Age: 57
End: 2022-06-02

## 2022-06-02 DIAGNOSIS — E78.5 DYSLIPIDEMIA: ICD-10-CM

## 2022-06-02 LAB
ALBUMIN SERPL-MCNC: 3.5 G/DL (ref 3.4–5)
ALP SERPL-CCNC: 61 U/L (ref 40–150)
ALT SERPL W P-5'-P-CCNC: 49 U/L (ref 0–70)
AST SERPL W P-5'-P-CCNC: 21 U/L (ref 0–45)
BILIRUB DIRECT SERPL-MCNC: 0.1 MG/DL (ref 0–0.2)
BILIRUB SERPL-MCNC: 0.4 MG/DL (ref 0.2–1.3)
PROT SERPL-MCNC: 7.9 G/DL (ref 6.8–8.8)

## 2022-06-02 PROCEDURE — 36415 COLL VENOUS BLD VENIPUNCTURE: CPT

## 2022-06-02 PROCEDURE — 80076 HEPATIC FUNCTION PANEL: CPT

## 2022-06-02 NOTE — PROGRESS NOTES
Patient came to lab appointment fasting.  He thought there would be a lipid panel as well, due to medication dose change.  If agree, please place order in Epic, or let lab know if not needed. Thanks

## 2022-06-08 DIAGNOSIS — E78.5 DYSLIPIDEMIA: Primary | ICD-10-CM

## 2022-06-09 LAB
CHOLEST SERPL-MCNC: 170 MG/DL
FASTING STATUS PATIENT QL REPORTED: YES
HDLC SERPL-MCNC: 39 MG/DL
LDLC SERPL CALC-MCNC: 93 MG/DL
NONHDLC SERPL-MCNC: 131 MG/DL
TRIGL SERPL-MCNC: 189 MG/DL

## 2022-06-09 PROCEDURE — 36415 COLL VENOUS BLD VENIPUNCTURE: CPT

## 2022-06-09 PROCEDURE — 80061 LIPID PANEL: CPT

## 2022-10-03 ENCOUNTER — TELEPHONE (OUTPATIENT)
Dept: CARDIOLOGY | Facility: CLINIC | Age: 57
End: 2022-10-03

## 2022-10-03 DIAGNOSIS — I25.110 CORONARY ARTERY DISEASE INVOLVING NATIVE CORONARY ARTERY OF NATIVE HEART WITH UNSTABLE ANGINA PECTORIS (H): ICD-10-CM

## 2022-10-03 DIAGNOSIS — I25.5 ISCHEMIC CARDIOMYOPATHY: ICD-10-CM

## 2022-10-03 NOTE — TELEPHONE ENCOUNTER
[facility] Call Center    Phone Message    May a detailed message be left on voicemail: yes     Reason for Call: Medication Refill Request    Has the patient contacted the pharmacy for the refill? Yes   Name of medication being requested:   atorvastatin (LIPITOR) 80 MG tablet  furosemide (LASIX) 40 MG tablet  Provider who prescribed the medication: Emile  Pharm[id number] [place] [place] [place] [address] AT [facility] [address]  Date medication is needed: 10/4/22      Action Taken: Message routed to:  Other: Cardiology    Travel Screening: Not Applicable     Thank you!  [facility]

## 2022-10-04 RX ORDER — FUROSEMIDE 40 MG
40 TABLET ORAL DAILY
Qty: 90 TABLET | Refills: 0 | Status: SHIPPED | OUTPATIENT
Start: 2022-10-04 | End: 2022-12-06

## 2022-10-04 RX ORDER — ATORVASTATIN CALCIUM 80 MG/1
80 TABLET, FILM COATED ORAL DAILY
Qty: 90 TABLET | Refills: 0 | Status: SHIPPED | OUTPATIENT
Start: 2022-10-04 | End: 2022-12-06

## 2022-10-04 NOTE — TELEPHONE ENCOUNTER
Pt has an upcoming appt with Dr. Farrar.    Did order Rx under Dr. Farrar's name due to Vannesa Baptiste no longer available.    Signed Prescriptions:                        Disp   Refills    atorvastatin (LIPITOR) 80 MG tablet        90 tab*0        Sig: Take 1 tablet (80 mg) by mouth daily  Authorizing Provider: SG FARRAR  Ordering User: CHIP WALSH    furosemide (LASIX) 40 MG tablet            90 tab*0        Sig: Take 1 tablet (40 mg) by mouth daily  Authorizing Provider: SG FARRAR  Ordering User: CHIP WALSH    Lab Results   Component Value Date    CHOL 170 06/09/2022    CHOL 125 08/31/2020     Lab Results   Component Value Date    HDL 39 06/09/2022    HDL 42 08/31/2020     Lab Results   Component Value Date    LDL 93 06/09/2022    LDL 58 08/31/2020     Lab Results   Component Value Date    TRIG 189 06/09/2022    TRIG 127 08/31/2020     No results found for: CHOLHDLRATIO  basic metabolic panel

## 2022-12-05 NOTE — PROGRESS NOTES
General Cardiology Clinic[facility]      HPI: Mr. Sarbjit Tran is a 56 year old  male with PMH significant for    -Non-STEMI, status post PCI to LAD 4/2019  -Morbid obesity (body mass index 70)    Patient is being seen today for routine annual follow-up.  He was last seen a year ago.    Patient was admitted to [facility] in April 2019 with chest pain and non-STEMI.  Coronary angiogram showed proximal LAD 99% stenosis.  Minimal CAD in circumflex and RCA.  Underwent drug-eluting stent to LAD.  Most recent echocardiogram 7/2020 showed normal LVEF 55 to 60%, normal RV function with no significant valve disease.  When he had non-STEMI in 2019 his EF was low at 40 to 45% which later recovered.  Overall feeling well.  No chest pain, palpitations.  Short of breath with activity.  Sometimes dizzy and lightheaded when changing positions.  Reports lower extremity edema.  No significant change in his symptoms over the last 1 year.    Pertinent Cardiac Medications:  40mg lasix daily  10mg lisinopril daily  40mg Lipitor daily  25mg spironolactone daily  50mg metoprolol XL daily  81mg ASA daily    Medications, personal, family, and social history reviewed with patient and revised.    PAST MEDICAL HISTORY:  Past Medical History:   Diagnosis Date     Abscess      Coronary artery disease     JEREL to proximal LAD on 4/30/19     Dyslipidemia      Hypertension      NSTEMI (non-ST elevated myocardial infarction) (H) 04/30/2019    JEREL to proximal LAD     Obesity      Sleep apnea     NEVER BEEN DIAGNOSED       CURRENT MEDICATIONS:  Current Outpatient Medications   Medication Sig Dispense Refill     acetaminophen (TYLENOL) 325 MG tablet Take 975 mg by mouth daily with Ibuprofen       aspirin 81 MG EC tablet Take 1 tablet (81 mg) by mouth daily 90 tablet 3     atorvastatin (LIPITOR) 80 MG tablet Take 1 tablet (80 mg) by mouth daily 90 tablet 0     calcium carbonate 600  mg-vitamin D 400 units (CALTRATE) 600-400 MG-UNIT per tablet Take 1 tablet by mouth daily       furosemide (LASIX) 40 MG tablet Take 1 tablet (40 mg) by mouth daily 90 tablet 0     lisinopril (ZESTRIL) 10 MG tablet Take 1 tablet (10 mg) by mouth daily 90 tablet 3     metoprolol succinate ER (TOPROL-XL) 50 MG 24 hr tablet Take 1 tablet (50 mg) by mouth daily 90 tablet 3     multivitamin w/minerals (MULTI-VITAMIN) tablet Take 2 tablets by mouth daily       nitroGLYcerin (NITROSTAT) 0.4 MG sublingual tablet For chest pain place 1 tablet under the tongue every 5 minutes for 3 doses. If symptoms persist 5 minutes after 1st dose call 911. (Patient not taking: Reported on 7/16/2020) 30 tablet 0     NONFORMULARY Take 1 tablet by mouth 2 times daily -  CIRCULEG - combination nutritional supplement to improve circulation       order for DME Equipment being ordered: Walker ()  Treatment Diagnosis: morbid obesity 1 Device 0     spironolactone (ALDACTONE) 25 MG tablet TAKE 1 TABLET(25 MG) BY MOUTH DAILY 90 tablet 3     vitamin D3 (CHOLECALCIFEROL) 1000 units (25 mcg) tablet Take 2,000 Units by mouth daily         PAST SURGICAL HISTORY:  Past Surgical History:   Procedure Laterality Date     CORONARY ANGIOGRAPHY ADULT ORDER  04/30/2019    JEREL to proximal LAD     CV HEART CATHETERIZATION WITH POSSIBLE INTERVENTION N/A 4/30/2019    JEREL to proximal LAD     EXCISE LESION TRUNK  5/11/2012    Procedure:EXCISE LESION TRUNK; excision back cyst; Surgeon:ELENA LEE; Location:[facility]     ORTHOPEDIC SURGERY      >15 on right hand  / 1on left       ALLERGIES:   No Known Allergies    FAMILY HISTORY:  Family History   Problem Relation Age of Onset     Hyperlipidemia Father         on statins     Coronary Artery Disease Paternal Grandfather         CABG         SOCIAL HISTORY:  Social History     Tobacco Use     Smoking status: Never     Smokeless tobacco: Never   Substance Use Topics     Alcohol use: Yes     Comment: 5 drinks/YEAR  "    Drug use: No       ROS:   Constitutional: No fever, chills, or sweats. Weight stable.   Cardiovascular: As per HPI.       Exam:  /77   Pulse 110   Ht 1.753 m (5' 9\")   Wt (!) 213.2 kg (470 lb)   SpO2 99%   BMI 69.41 kg/m    GENERAL APPEARANCE: alert and no distress  HEENT: no icterus, no central cyanosis  LYMPH/NECK: no adenopathy, no asymmetry  RESPIRATORY: lungs clear to auscultation - no rales, rhonchi or wheezes, no use of accessory muscles, no retractions, respirations are unlabored, normal respiratory rate  CARDIOVASCULAR: regular rhythm, normal S1, S2, no S3 or S4 and no murmur, click or rub, precordium quiet with normal PMI.  EXTREMITIES:trace edema  NEURO: alert, normal speech,and affect  SKIN: no ecchymoses, no rashes     I have reviewed the labs and personally reviewed the imaging below and made my comment in the assessment and plan.    Labs:  CBC RESULTS:   Lab Results   Component Value Date    WBC 8.5 05/01/2019    RBC 4.06 (L) 05/01/2019    HGB 12.1 (L) 05/01/2019    HCT 37.2 (L) 05/01/2019    MCV 92 05/01/2019    MCH 29.8 05/01/2019    MCHC 32.5 05/01/2019    RDW 13.9 05/01/2019     05/01/2019       BMP RESULTS:  Lab Results   Component Value Date     09/21/2021     08/31/2020    POTASSIUM 3.8 09/21/2021    POTASSIUM 4.1 08/31/2020    CHLORIDE 106 09/21/2021    CHLORIDE 107 08/31/2020    CO2 24 09/21/2021    CO2 20 08/31/2020    ANIONGAP 8 09/21/2021    ANIONGAP 10 08/31/2020     (H) 09/21/2021     (H) 08/31/2020    BUN 21 09/21/2021    BUN 27 08/31/2020    CR 0.90 09/21/2021    CR 1.04 08/31/2020    GFRESTIMATED >90 09/21/2021    GFRESTIMATED 80 08/31/2020    GFRESTBLACK >90 08/31/2020    DENG 8.9 09/21/2021    DENG 9.1 08/31/2020     Coronary Angiogram 4/30/2019:  NSTEMI with ongoing chest pain.  ELLIE 2 flow in LAD  PCI of mid LAD with JEREL, improved distal vessel to ELLIE 3 flow.   No compromise of large diagonal branch.     Echocardiogram " 7/2020:  Technically difficult study.Extremely difficult acoustic windows despite the  use of contrast for endocardial border definition.  Global and regional left ventricular function is probably normal with an EF of  55-60%.  The right ventricle is difficult to assess. Likely normal in function.  This study was compared with the study from 4/30/2019. The distal wall motion  abnormalities appear improved on limited views.     Assessment and Plan:   Sarbjit Tran is a 55 year old male with PMH notable for CAD s/p PCI LAD 4/2019, hx HFrEF (ischemic cardiomyopathy) - now recovered, HTN, HLD, and obesity. He presents for yearly follow up.  Reports overall feeling well except he has gained weight since he is not able to go to the gym.  Reports mild edema in his legs.  Stable on Lasix.     # CAD s/p PCI to LAD 4/2019  # Hx of Ischemic Cardiomyopathy, HFrEF - EF recovered  -Continue current treatment with aspirin 81, atorvastatin 80, Lasix 40 mg, lisinopril 10, metoprolol 50 and spironolactone 25 mg.     # HTN  -Well-controlled  # HLD  -Atorvastatin dose was recently increased to 80 mg we will recheck lipid panel.    Labs today: CBC, CMP, hemoglobin A1c     Return to clinic 2 years.    Total time spent today for this visit is 40 minutes including precharting, face-to-face clinic visit, review of labs/imaging and medical documentation.    Please donot hesitate to contact me if you have any questions or concerns. Again, thank you for allowing me to participate in the care of your patient.    Haily FARRAR MD  [facility] Division of Cardiology  Pager [phone]

## 2022-12-06 ENCOUNTER — OFFICE VISIT (OUTPATIENT)
Dept: CARDIOLOGY | Facility: CLINIC | Age: 57
End: 2022-12-06
Payer: COMMERCIAL

## 2022-12-06 VITALS
OXYGEN SATURATION: 99 % | HEART RATE: 110 BPM | BODY MASS INDEX: 46.65 KG/M2 | DIASTOLIC BLOOD PRESSURE: 77 MMHG | HEIGHT: 69 IN | SYSTOLIC BLOOD PRESSURE: 122 MMHG | WEIGHT: 315 LBS

## 2022-12-06 DIAGNOSIS — I25.10 CORONARY ARTERY DISEASE INVOLVING NATIVE HEART WITHOUT ANGINA PECTORIS, UNSPECIFIED VESSEL OR LESION TYPE: ICD-10-CM

## 2022-12-06 DIAGNOSIS — E66.01 MORBID OBESITY (H): ICD-10-CM

## 2022-12-06 DIAGNOSIS — I21.4 NSTEMI (NON-ST ELEVATED MYOCARDIAL INFARCTION) (H): Primary | ICD-10-CM

## 2022-12-06 PROCEDURE — 99215 OFFICE O/P EST HI 40 MIN: CPT | Performed by: INTERNAL MEDICINE

## 2022-12-06 RX ORDER — METOPROLOL SUCCINATE 50 MG/1
50 TABLET, EXTENDED RELEASE ORAL DAILY
Qty: 90 TABLET | Refills: 3 | Status: SHIPPED | OUTPATIENT
Start: 2022-12-06 | End: 2024-03-04

## 2022-12-06 RX ORDER — FUROSEMIDE 40 MG
40 TABLET ORAL DAILY
Qty: 90 TABLET | Refills: 3 | Status: SHIPPED | OUTPATIENT
Start: 2022-12-06 | End: 2024-03-04

## 2022-12-06 RX ORDER — LISINOPRIL 10 MG/1
10 TABLET ORAL DAILY
Qty: 90 TABLET | Refills: 3 | Status: SHIPPED | OUTPATIENT
Start: 2022-12-06 | End: 2024-03-04

## 2022-12-06 RX ORDER — SPIRONOLACTONE 25 MG/1
25 TABLET ORAL DAILY
Qty: 90 TABLET | Refills: 3 | Status: SHIPPED | OUTPATIENT
Start: 2022-12-06 | End: 2024-03-04

## 2022-12-06 RX ORDER — ATORVASTATIN CALCIUM 80 MG/1
80 TABLET, FILM COATED ORAL DAILY
Qty: 90 TABLET | Refills: 3 | Status: SHIPPED | OUTPATIENT
Start: 2022-12-06 | End: 2023-01-05

## 2022-12-06 NOTE — LETTER
12/6/2022      RE: Sarbjit Tran  [address] [facility] [address]       Dear Colleague,    Thank you for the opportunity to participate in the care of your patient, Sarbjit Tran, at the [facility] HEART CLINIC [facility]. Please see a copy of my visit note below.                                                                          General Cardiology Clinic-[place]      HPI: Mr. Sarbjit Tran is a 56 year old  male with PMH significant for    -Non-STEMI, status post PCI to LAD 4/2019  -Morbid obesity (body mass index 70)    Patient is being seen today for routine annual follow-up.  He was last seen a year ago.    Patient was admitted to [facility] in April 2019 with chest pain and non-STEMI.  Coronary angiogram showed proximal LAD 99% stenosis.  Minimal CAD in circumflex and RCA.  Underwent drug-eluting stent to LAD.  Most recent echocardiogram 7/2020 showed normal LVEF 55 to 60%, normal RV function with no significant valve disease.  When he had non-STEMI in 2019 his EF was low at 40 to 45% which later recovered.  Overall feeling well.  No chest pain, palpitations.  Short of breath with activity.  Sometimes dizzy and lightheaded when changing positions.  Reports lower extremity edema.  No significant change in his symptoms over the last 1 year.    Pertinent Cardiac Medications:  40mg lasix daily  10mg lisinopril daily  40mg Lipitor daily  25mg spironolactone daily  50mg metoprolol XL daily  81mg ASA daily    Medications, personal, family, and social history reviewed with patient and revised.    PAST MEDICAL HISTORY:  Past Medical History:   Diagnosis Date     Abscess      Coronary artery disease     JEREL to proximal LAD on 4/30/19     Dyslipidemia      Hypertension      NSTEMI (non-ST elevated myocardial infarction) (H) 04/30/2019    JEREL to proximal LAD     Obesity      Sleep apnea     NEVER BEEN DIAGNOSED       CURRENT  MEDICATIONS:  Current Outpatient Medications   Medication Sig Dispense Refill     acetaminophen (TYLENOL) 325 MG tablet Take 975 mg by mouth daily with Ibuprofen       aspirin 81 MG EC tablet Take 1 tablet (81 mg) by mouth daily 90 tablet 3     atorvastatin (LIPITOR) 80 MG tablet Take 1 tablet (80 mg) by mouth daily 90 tablet 0     calcium carbonate 600 mg-vitamin D 400 units (CALTRATE) 600-400 MG-UNIT per tablet Take 1 tablet by mouth daily       furosemide (LASIX) 40 MG tablet Take 1 tablet (40 mg) by mouth daily 90 tablet 0     lisinopril (ZESTRIL) 10 MG tablet Take 1 tablet (10 mg) by mouth daily 90 tablet 3     metoprolol succinate ER (TOPROL-XL) 50 MG 24 hr tablet Take 1 tablet (50 mg) by mouth daily 90 tablet 3     multivitamin w/minerals (MULTI-VITAMIN) tablet Take 2 tablets by mouth daily       nitroGLYcerin (NITROSTAT) 0.4 MG sublingual tablet For chest pain place 1 tablet under the tongue every 5 minutes for 3 doses. If symptoms persist 5 minutes after 1st dose call 911. (Patient not taking: Reported on 7/16/2020) 30 tablet 0     NONFORMULARY Take 1 tablet by mouth 2 times daily -  CIRCULEG - combination nutritional supplement to improve circulation       order for DME Equipment being ordered: Walker ()  Treatment Diagnosis: morbid obesity 1 Device 0     spironolactone (ALDACTONE) 25 MG tablet TAKE 1 TABLET(25 MG) BY MOUTH DAILY 90 tablet 3     vitamin D3 (CHOLECALCIFEROL) 1000 units (25 mcg) tablet Take 2,000 Units by mouth daily         PAST SURGICAL HISTORY:  Past Surgical History:   Procedure Laterality Date     CORONARY ANGIOGRAPHY ADULT ORDER  04/30/2019    JEREL to proximal LAD     CV HEART CATHETERIZATION WITH POSSIBLE INTERVENTION N/A 4/30/2019    JEREL to proximal LAD     EXCISE LESION TRUNK  5/11/2012    Procedure:EXCISE LESION TRUNK; excision back cyst; Surgeon:ELENA LEE; Location:[facility]     ORTHOPEDIC SURGERY      >15 on right hand  / 1on left       ALLERGIES:   No Known  "Allergies    FAMILY HISTORY:  Family History   Problem Relation Age of Onset     Hyperlipidemia Father         on statins     Coronary Artery Disease Paternal Grandfather         CABG         SOCIAL HISTORY:  Social History     Tobacco Use     Smoking status: Never     Smokeless tobacco: Never   Substance Use Topics     Alcohol use: Yes     Comment: 5 drinks/YEAR     Drug use: No       ROS:   Constitutional: No fever, chills, or sweats. Weight stable.   Cardiovascular: As per HPI.       Exam:  /77   Pulse 110   Ht 1.753 m (5' 9\")   Wt (!) 213.2 kg (470 lb)   SpO2 99%   BMI 69.41 kg/m    GENERAL APPEARANCE: alert and no distress  HEENT: no icterus, no central cyanosis  LYMPH/NECK: no adenopathy, no asymmetry  RESPIRATORY: lungs clear to auscultation - no rales, rhonchi or wheezes, no use of accessory muscles, no retractions, respirations are unlabored, normal respiratory rate  CARDIOVASCULAR: regular rhythm, normal S1, S2, no S3 or S4 and no murmur, click or rub, precordium quiet with normal PMI.  EXTREMITIES:trace edema  NEURO: alert, normal speech,and affect  SKIN: no ecchymoses, no rashes     I have reviewed the labs and personally reviewed the imaging below and made my comment in the assessment and plan.    Labs:  CBC RESULTS:   Lab Results   Component Value Date    WBC 8.5 05/01/2019    RBC 4.06 (L) 05/01/2019    HGB 12.1 (L) 05/01/2019    HCT 37.2 (L) 05/01/2019    MCV 92 05/01/2019    MCH 29.8 05/01/2019    MCHC 32.5 05/01/2019    RDW 13.9 05/01/2019     05/01/2019       BMP RESULTS:  Lab Results   Component Value Date     09/21/2021     08/31/2020    POTASSIUM 3.8 09/21/2021    POTASSIUM 4.1 08/31/2020    CHLORIDE 106 09/21/2021    CHLORIDE 107 08/31/2020    CO2 24 09/21/2021    CO2 20 08/31/2020    ANIONGAP 8 09/21/2021    ANIONGAP 10 08/31/2020     (H) 09/21/2021     (H) 08/31/2020    BUN 21 09/21/2021    BUN 27 08/31/2020    CR 0.90 09/21/2021    CR 1.04 " 08/31/2020    GFRESTIMATED >90 09/21/2021    GFRESTIMATED 80 08/31/2020    GFRESTBLACK >90 08/31/2020    DENG 8.9 09/21/2021    DENG 9.1 08/31/2020     Coronary Angiogram 4/30/2019:  NSTEMI with ongoing chest pain.  ELLIE 2 flow in LAD  PCI of mid LAD with JEREL, improved distal vessel to ELLIE 3 flow.   No compromise of large diagonal branch.     Echocardiogram 7/2020:  Technically difficult study.Extremely difficult acoustic windows despite the  use of contrast for endocardial border definition.  Global and regional left ventricular function is probably normal with an EF of  55-60%.  The right ventricle is difficult to assess. Likely normal in function.  This study was compared with the study from 4/30/2019. The distal wall motion  abnormalities appear improved on limited views.     Assessment and Plan:   Sarbjit Tran is a 55 year old male with PMH notable for CAD s/p PCI LAD 4/2019, hx HFrEF (ischemic cardiomyopathy) - now recovered, HTN, HLD, and obesity. He presents for yearly follow up.  Reports overall feeling well except he has gained weight since he is not able to go to the gym.  Reports mild edema in his legs.  Stable on Lasix.     # CAD s/p PCI to LAD 4/2019  # Hx of Ischemic Cardiomyopathy, HFrEF - EF recovered  -Continue current treatment with aspirin 81, atorvastatin 80, Lasix 40 mg, lisinopril 10, metoprolol 50 and spironolactone 25 mg.     # HTN  -Well-controlled  # HLD  -Atorvastatin dose was recently increased to 80 mg we will recheck lipid panel.    Labs today: CBC, CMP, hemoglobin A1c     Return to clinic 2 years.    Total time spent today for this visit is 40 minutes including precharting, face-to-face clinic visit, review of labs/imaging and medical documentation.    Please donot hesitate to contact me if you have any questions or concerns. Again, thank you for allowing me to participate in the care of your patient.    Haliy FARRAR MD  [facility] Division of Cardiology  Pager  [phone]      Please do not hesitate to contact me if you have any questions/concerns.     Sincerely,     Haily Lorenz MD

## 2022-12-06 NOTE — NURSING NOTE
"Chief Complaint   Patient presents with     Follow Up     Dyslipidemia and CAD. Former patients of Dr Mott and Vannesa Baptiste.       Initial /77   Pulse 110   Ht 1.753 m (5' 9\")   Wt (!) 213.2 kg (470 lb)   SpO2 99%   BMI 69.41 kg/m   Estimated body mass index is 69.41 kg/m  as calculated from the following:    Height as of this encounter: 1.753 m (5' 9\").    Weight as of this encounter: 213.2 kg (470 lb)..  BP completed using cuff size: large on forearm    Chichi Recinos CMA (AAMA)  "

## 2022-12-06 NOTE — LETTER
Date:December 7, 2022      Patient was self referred, no letter generated. Do not send.        [facility] Health Information

## 2022-12-06 NOTE — PATIENT INSTRUCTIONS
Thank you for coming to the [facility] at [place]; please note the following instructions:    1. Dr. Haily Lorenz would like you to return for a cardiac follow up in 2 years  (December).  We will contact you regarding your appointment when the time draws closer or you may call [phone] option #1 to arrange an appointment.  Mean while, if you should have any questions or concerns regarding your heart health, please contact us.  Thank you for choosing [facility] for your care.    2. Dr Lorenz would like you to have some labs done soon.    3. Dr Lorenz discussed with you the importance of a low carb diet and intermittent fasting.           If you have any questions regarding your visit, please contact your care team:     CARDIOLOGY  TELEPHONE NUMBER   Tracie LYONSAdarsh, Registered Nurse  Shaye HERNANDEZ, Registered Nurse  Grazyna REDMAN, Registered Medical Assistant  Chichi BALDWIN, Certified Medical Assistant  Inge FISHER, Visit Facilitator [phone] (select option 1)    *After hours: [phone]   For Scheduling Appts:     [phone] (select option 1)    *After hours: [phone]   For the Device Clinic (Pacemakers and ICD's)  Crystal KAUR, Registered Nurse   During business hours: [phone]    *After business hours:  [phone] (select option 4)      Normal test result notifications will be released via PingThings or mailed within 7 business days.  All other test results, will be communicated via telephone once reviewed by your cardiologist.    If you need a medication refill, please contact your pharmacy.  Please allow 3 business days for your refill to be completed.    As always, thank you for trusting us with your health care needs!

## 2023-01-04 DIAGNOSIS — I25.110 ATHEROSCLEROTIC HEART DISEASE OF NATIVE CORONARY ARTERY WITH UNSTABLE ANGINA PECTORIS (H): Primary | ICD-10-CM

## 2023-01-04 DIAGNOSIS — I25.110 ATHEROSCLEROSIS OF NATIVE CORONARY ARTERY OF NATIVE HEART WITH UNSTABLE ANGINA PECTORIS (H): ICD-10-CM

## 2023-01-05 RX ORDER — ATORVASTATIN CALCIUM 80 MG/1
80 TABLET, FILM COATED ORAL DAILY
Qty: 90 TABLET | Refills: 3 | Status: SHIPPED | OUTPATIENT
Start: 2023-01-05 | End: 2024-03-06

## 2023-01-05 NOTE — TELEPHONE ENCOUNTER
ATORVASTATIN 80MG TABLETS  Last Written Prescription Date:   12/6/2022  Last Fill Quantity: 90,   # refills: 3  Last Office Visit :  12/6/2022  Future Office visit:  None  90 Tabs, 3 Refills sent to pharm 1/5/2023      Rere Perez RN  Central Triage Red Flags/Med Refills

## 2023-04-23 ENCOUNTER — HEALTH MAINTENANCE LETTER (OUTPATIENT)
Age: 58
End: 2023-04-23

## 2023-06-02 ENCOUNTER — HEALTH MAINTENANCE LETTER (OUTPATIENT)
Age: 58
End: 2023-06-02

## 2024-02-27 DIAGNOSIS — I25.10 CORONARY ARTERY DISEASE INVOLVING NATIVE HEART WITHOUT ANGINA PECTORIS, UNSPECIFIED VESSEL OR LESION TYPE: ICD-10-CM

## 2024-02-27 DIAGNOSIS — I21.4 NSTEMI (NON-ST ELEVATED MYOCARDIAL INFARCTION) (H): ICD-10-CM

## 2024-02-27 DIAGNOSIS — I10 ESSENTIAL HYPERTENSION: Primary | ICD-10-CM

## 2024-02-29 DIAGNOSIS — E78.5 DYSLIPIDEMIA: Primary | ICD-10-CM

## 2024-02-29 DIAGNOSIS — I25.110 CORONARY ARTERY DISEASE INVOLVING NATIVE CORONARY ARTERY OF NATIVE HEART WITH UNSTABLE ANGINA PECTORIS (H): ICD-10-CM

## 2024-02-29 DIAGNOSIS — I25.110 ATHEROSCLEROTIC HEART DISEASE OF NATIVE CORONARY ARTERY WITH UNSTABLE ANGINA PECTORIS (H): ICD-10-CM

## 2024-03-04 RX ORDER — SPIRONOLACTONE 25 MG/1
25 TABLET ORAL DAILY
Qty: 90 TABLET | Refills: 0 | Status: SHIPPED | OUTPATIENT
Start: 2024-03-04 | End: 2024-07-02

## 2024-03-04 RX ORDER — LISINOPRIL 10 MG/1
10 TABLET ORAL DAILY
Qty: 90 TABLET | Refills: 0 | Status: SHIPPED | OUTPATIENT
Start: 2024-03-04 | End: 2024-07-05

## 2024-03-04 RX ORDER — METOPROLOL SUCCINATE 50 MG/1
50 TABLET, EXTENDED RELEASE ORAL DAILY
Qty: 90 TABLET | Refills: 0 | Status: SHIPPED | OUTPATIENT
Start: 2024-03-04 | End: 2024-07-05

## 2024-03-04 RX ORDER — FUROSEMIDE 40 MG
40 TABLET ORAL DAILY
Qty: 90 TABLET | Refills: 0 | Status: SHIPPED | OUTPATIENT
Start: 2024-03-04 | End: 2024-07-02

## 2024-03-04 NOTE — TELEPHONE ENCOUNTER
metoprolol succinate ER (TOPROL XL) 50 MG 24 hr tablet 90 tablet 3 12/6/2022       Beta-Blockers Protocol Ndvgad7202/27/2024 04:00 AM   Protocol Details Blood pressure under 140/90 in past 12 months    Recent (12 mo) or future (90 days) visit within the authorizing provider's specialty        BP Readings from Last 3 Encounters:   12/06/22 122/77   09/21/21 120/80   08/18/20 (!) 176/80       spironolactone (ALDACTONE) 25 MG tablet 90 tablet 3 12/6/2022       Diuretics (Including Combos) Protocol Dlglvu5602/27/2024 04:00 AM   Protocol Details Blood pressure under 140/90 in past 12 months    Has GFR on file in past 12 months and most recent value is normal       Recent (12 mo) or future (90 days) visit within the authorizing provider's specialty        furosemide (LASIX) 40 MG tablet 90 tablet 3 12/6/2022       Diuretics (Including Combos) Protocol Ieqcil2502/27/2024 04:00 AM   Protocol Details Blood pressure under 140/90 in past 12 months    Has GFR on file in past 12 months and most recent value is normal       Recent (12 mo) or future (90 days) visit within the authorizing provider's specialty          lisinopril (ZESTRIL) 10 MG tablet 90 tablet 3 12/6/2022       ACE Inhibitors (Including Combos) Protocol Zqimir2902/27/2024 04:00 AM   Protocol Details Blood pressure under 140/90 in past 12 months    Has GFR on file in past 12 months and most recent value is normal    Recent (12 mo) or future (90 days) visit within the authorizing provider's specialty    Normal serum creatinine on file in past 12 months    Normal serum potassium on file in past 12 months          Last Office Visit: 12/6/22  Future Office visit:   none    90 day earl refill sent to the pharmacy with instructions to make a follow up appt. Overdue labs and appointment per the refill protocol     Tammi Bowles RN  P Red Flag Triage/MRT

## 2024-03-06 RX ORDER — ATORVASTATIN CALCIUM 80 MG/1
80 TABLET, FILM COATED ORAL DAILY
Qty: 90 TABLET | Refills: 0 | Status: SHIPPED | OUTPATIENT
Start: 2024-03-06 | End: 2024-07-02

## 2024-03-06 NOTE — TELEPHONE ENCOUNTER
atorvastatin (LIPITOR) 80 MG tablet   90 tablet 3 1/5/2023       Last Office Visit : 12-6-2022  Future Office visit:  none    Antihyperlipidemic agents Mvxqgz8503/06/2024 11:32 AM   Protocol Details LDL on file in the past 12 months    Recent (12 mo) or future (90 days) visit within the authorizing provider's specialty     Labs completed on : 6-9-2022  LDL Cholesterol Calculated  <=100 mg/dL 93     Labs completed on : 06-  ALT  0 - 70 U/L 49     Carolina ferguson f

## 2024-03-07 NOTE — TELEPHONE ENCOUNTER
Called and LVM for patient to return call to clinic scheduler to schedule lab work. Told patient that he can schedule with Dr. Lorenz in November if he wishes as her December schedule (due December 2024) is not open yet.    NANETTE Bello

## 2024-03-19 ENCOUNTER — TELEPHONE (OUTPATIENT)
Dept: CARDIOLOGY | Facility: CLINIC | Age: 59
End: 2024-03-19
Payer: COMMERCIAL

## 2024-03-20 ENCOUNTER — LAB (OUTPATIENT)
Dept: LAB | Facility: CLINIC | Age: 59
End: 2024-03-20
Payer: COMMERCIAL

## 2024-03-20 ENCOUNTER — DOCUMENTATION ONLY (OUTPATIENT)
Dept: CARDIOLOGY | Facility: CLINIC | Age: 59
End: 2024-03-20

## 2024-03-20 DIAGNOSIS — I21.4 NSTEMI (NON-ST ELEVATED MYOCARDIAL INFARCTION) (H): ICD-10-CM

## 2024-03-20 DIAGNOSIS — I25.10 CORONARY ARTERY DISEASE INVOLVING NATIVE HEART WITHOUT ANGINA PECTORIS, UNSPECIFIED VESSEL OR LESION TYPE: ICD-10-CM

## 2024-03-20 DIAGNOSIS — I21.4 NSTEMI (NON-ST ELEVATED MYOCARDIAL INFARCTION) (H): Primary | ICD-10-CM

## 2024-03-20 DIAGNOSIS — I10 ESSENTIAL HYPERTENSION: ICD-10-CM

## 2024-03-20 PROCEDURE — 80048 BASIC METABOLIC PNL TOTAL CA: CPT

## 2024-03-20 PROCEDURE — 36415 COLL VENOUS BLD VENIPUNCTURE: CPT

## 2024-03-20 NOTE — PROGRESS NOTES
Patient has an upcoming lab appointment today. Please review and place future orders that are needed.  Malini Toth on 3/20/2024 at 8:40 AM

## 2024-03-20 NOTE — CONFIDENTIAL NOTE
metoprolol succinate ER (TOPROL XL) 50 MG 24 hr tablet         Sig: Take 1 tablet (50 mg) by mouth daily Call clinic to schedule follow up appointment and labs. [phone]    Original sig: TAKE 1 TABLET(50 MG) BY MOUTH DAILY    Disp: 90 tablet    Refills: 0 (Pharmacy requested: Not specified)    Start: 3/4/2024    Class: E-Prescribe    Authorized by: Tammi Bowles RN    For: NSTEMI (non-ST elevated myocardial infarction) (H); Coronary artery disease involving native heart without angina pectoris, unspecified vessel or lesion type         spironolactone (ALDACTONE) 25 MG tablet         Sig: Take 1 tablet (25 mg) by mouth daily Call clinic to schedule follow up appointment and labs. [phone]    Original sig: TAKE 1 TABLET(25 MG) BY MOUTH DAILY    Disp: 90 tablet    Refills: 0 (Pharmacy requested: Not specified)    Start: 3/4/2024    Class: E-Prescribe    Authorized by: Tammi Bowles RN    For: Essential hypertension         furosemide (LASIX) 40 MG tablet         Sig: Take 1 tablet (40 mg) by mouth daily    Original sig: TAKE 1 TABLET(40 MG) BY MOUTH DAILY    Disp: 90 tablet    Refills: 0 (Pharmacy requested: Not specified)    Start: 3/4/2024    Class: E-Prescribe    Authorized by: Tammi Bowles RN    For: Essential hypertension    To pharmacy: Call clinic to schedule follow up appointment and labs. [phone]         lisinopril (ZESTRIL) 10 MG tablet         Sig: Take 1 tablet (10 mg) by mouth daily Call clinic to schedule follow up appointment and labs. [phone]    Original sig: TAKE 1 TABLET(10 MG) BY MOUTH DAILY    Disp: 90 tablet    Refills: 0 (Pharmacy requested: Not specified)    Start: 3/4/2024    Class: E-Prescribe    Authorized by: Tammi Bowles RN    For: NSTEMI (non-ST elevated myocardial infarction) (H); Coronary artery disease involving native heart without angina pectoris, unspecified vessel or lesion type; Essential hypertension        To be filled at: [id number] - [facility] [address] AT [facility] [address]             Carolina refill already sent in for patient. Patient due for labs. Patient last saw Dr. Lorenz on 12/6/22 and was to follow up in 2 years. Patient overdue for BMP lab per refill protocols. BMP lab placed. Last BMP was 9/21/21. Patient scheduled for labs on 3/20/24. Patient due for follow up in December.    Leighann Rosario, RN, BSN  Cardiology RN Care Coordinator   [place]   Phone: [phone]  Fax: [phone] ([place]) [phone] ([place])

## 2024-03-21 LAB
ANION GAP SERPL CALCULATED.3IONS-SCNC: 19 MMOL/L (ref 7–15)
BUN SERPL-MCNC: 18.3 MG/DL (ref 8–23)
CALCIUM SERPL-MCNC: 9.5 MG/DL (ref 8.8–10.2)
CHLORIDE SERPL-SCNC: 102 MMOL/L (ref 98–107)
CREAT SERPL-MCNC: 0.92 MG/DL (ref 0.67–1.17)
DEPRECATED HCO3 PLAS-SCNC: 20 MMOL/L (ref 22–29)
EGFRCR SERPLBLD CKD-EPI 2021: >90 ML/MIN/1.73M2
GLUCOSE SERPL-MCNC: 129 MG/DL (ref 70–99)
POTASSIUM SERPL-SCNC: 3.9 MMOL/L (ref 3.4–5.3)
SODIUM SERPL-SCNC: 141 MMOL/L (ref 135–145)

## 2024-04-04 NOTE — TELEPHONE ENCOUNTER
Patient to due for LDL. Lipid panel reentered.     Leighann Rosario, RN, BSN  Cardiology RN Care Coordinator   [place]   Phone: [phone]  Fax: [phone] ([place]) [phone] ([place])

## 2024-04-04 NOTE — TELEPHONE ENCOUNTER
Attempted to reach patient. Left a message for patient to call back to schedule fasting labs.    VINOD Cook

## 2024-04-08 ENCOUNTER — MYC MEDICAL ADVICE (OUTPATIENT)
Dept: CARDIOLOGY | Facility: CLINIC | Age: 59
End: 2024-04-08
Payer: COMMERCIAL

## 2024-04-08 NOTE — TELEPHONE ENCOUNTER
[employer] message sent to patient to schedule fasting lab work and follow-up with Dr. Lorenz December 2024.    NANETTE Bello

## 2024-04-10 NOTE — TELEPHONE ENCOUNTER
See separate MyChart encounter. Patient responded via MyChart regarding lab work.    NANETTE Bello

## 2024-05-08 ENCOUNTER — TELEPHONE (OUTPATIENT)
Dept: CARDIOLOGY | Facility: CLINIC | Age: 59
End: 2024-05-08
Payer: COMMERCIAL

## 2024-05-08 DIAGNOSIS — I21.4 NSTEMI (NON-ST ELEVATED MYOCARDIAL INFARCTION) (H): Primary | ICD-10-CM

## 2024-05-08 DIAGNOSIS — I10 ESSENTIAL HYPERTENSION: ICD-10-CM

## 2024-06-19 DIAGNOSIS — I25.110 ATHEROSCLEROTIC HEART DISEASE OF NATIVE CORONARY ARTERY WITH UNSTABLE ANGINA PECTORIS (H): ICD-10-CM

## 2024-06-26 DIAGNOSIS — I21.4 NSTEMI (NON-ST ELEVATED MYOCARDIAL INFARCTION) (H): ICD-10-CM

## 2024-06-26 DIAGNOSIS — I10 ESSENTIAL HYPERTENSION: ICD-10-CM

## 2024-06-26 DIAGNOSIS — I25.10 CORONARY ARTERY DISEASE INVOLVING NATIVE HEART WITHOUT ANGINA PECTORIS, UNSPECIFIED VESSEL OR LESION TYPE: ICD-10-CM

## 2024-06-28 DIAGNOSIS — I10 ESSENTIAL HYPERTENSION: ICD-10-CM

## 2024-06-28 NOTE — TELEPHONE ENCOUNTER
ATORVASTATIN 80MG TABLETS       Last Written Prescription Date:  3-6-24  Last Fill Quantity: 90,   # refills: 0  Last Office Visit : 12-6-22  (RTC 2 Years)  Future Office visit:  none    Routing refill request to provider for review/approval because:  Overdue LDL, previous earl rf,  future lab order in epic

## 2024-06-30 ENCOUNTER — HEALTH MAINTENANCE LETTER (OUTPATIENT)
Age: 59
End: 2024-06-30

## 2024-07-01 DIAGNOSIS — I25.110 CORONARY ARTERY DISEASE INVOLVING NATIVE CORONARY ARTERY OF NATIVE HEART WITH UNSTABLE ANGINA PECTORIS (H): ICD-10-CM

## 2024-07-01 DIAGNOSIS — I25.110 ATHEROSCLEROTIC HEART DISEASE OF NATIVE CORONARY ARTERY WITH UNSTABLE ANGINA PECTORIS (H): Primary | ICD-10-CM

## 2024-07-02 ENCOUNTER — MYC REFILL (OUTPATIENT)
Dept: CARDIOLOGY | Facility: CLINIC | Age: 59
End: 2024-07-02
Payer: COMMERCIAL

## 2024-07-02 DIAGNOSIS — I21.4 NSTEMI (NON-ST ELEVATED MYOCARDIAL INFARCTION) (H): ICD-10-CM

## 2024-07-02 DIAGNOSIS — I10 ESSENTIAL HYPERTENSION: ICD-10-CM

## 2024-07-02 DIAGNOSIS — I25.10 CORONARY ARTERY DISEASE INVOLVING NATIVE HEART WITHOUT ANGINA PECTORIS, UNSPECIFIED VESSEL OR LESION TYPE: ICD-10-CM

## 2024-07-02 DIAGNOSIS — I25.110 ATHEROSCLEROTIC HEART DISEASE OF NATIVE CORONARY ARTERY WITH UNSTABLE ANGINA PECTORIS (H): ICD-10-CM

## 2024-07-02 RX ORDER — ATORVASTATIN CALCIUM 80 MG/1
80 TABLET, FILM COATED ORAL DAILY
Qty: 30 TABLET | Refills: 0 | Status: CANCELLED | OUTPATIENT
Start: 2024-07-02

## 2024-07-02 RX ORDER — METOPROLOL SUCCINATE 50 MG/1
50 TABLET, EXTENDED RELEASE ORAL DAILY
Qty: 90 TABLET | Refills: 0 | Status: CANCELLED | OUTPATIENT
Start: 2024-07-02

## 2024-07-02 RX ORDER — ATORVASTATIN CALCIUM 80 MG/1
80 TABLET, FILM COATED ORAL DAILY
Qty: 30 TABLET | Refills: 0 | Status: SHIPPED | OUTPATIENT
Start: 2024-07-02 | End: 2024-07-03

## 2024-07-02 RX ORDER — LISINOPRIL 10 MG/1
10 TABLET ORAL DAILY
Qty: 90 TABLET | Refills: 0 | Status: CANCELLED | OUTPATIENT
Start: 2024-07-02

## 2024-07-02 NOTE — TELEPHONE ENCOUNTER
Name from pharmacy: ATORVASTATIN 80MG TABLETS          Will file in chart as: atorvastatin (LIPITOR) 80 MG tablet    Sig: Take 1 tablet (80 mg) by mouth daily Lab due for refill    Original sig: TAKE 1 TABLET(80 MG) BY MOUTH DAILY    Disp: 90 tablet    Refills: Not specified    Start: 6/28/2024    Class: E-Prescribe    Non-formulary For: Atherosclerotic heart disease of native coronary artery with unstable angina pectoris (H)    Last ordered: 3 months ago (3/6/2024) by Haily Lorenz MD    Last refill: 3/20/2024    Rx #: [id number]    Antihyperlipidemic agents Gfxnnx6706/28/2024 10:17 AM   Protocol Details LDL on file in the past 12 months    Recent (12 mo) or future (90 days) visit within the authorizing provider's specialty    Medication is active on med list    Patient is age 18 years or older      To be filled at: [id number] - [address] AT [facility] [address]     90 day Earl refill already sent for patient. Patient is overdue for labs. 30 day earl refill sent for patient so that patient is not without. Patient is overdue for fasting labs. Patient is scheduled for fasting labs on 7/3/24.     Leighann Rosario, RN, BSN  Cardiology RN Care Coordinator   [place]   Phone: [phone]  Fax: [phone] ([place]) [phone] ([place])

## 2024-07-03 ENCOUNTER — LAB (OUTPATIENT)
Dept: LAB | Facility: CLINIC | Age: 59
End: 2024-07-03
Payer: COMMERCIAL

## 2024-07-03 DIAGNOSIS — I25.110 CORONARY ARTERY DISEASE INVOLVING NATIVE CORONARY ARTERY OF NATIVE HEART WITH UNSTABLE ANGINA PECTORIS (H): ICD-10-CM

## 2024-07-03 DIAGNOSIS — I25.110 ATHEROSCLEROTIC HEART DISEASE OF NATIVE CORONARY ARTERY WITH UNSTABLE ANGINA PECTORIS (H): ICD-10-CM

## 2024-07-03 LAB
CHOLEST SERPL-MCNC: 124 MG/DL
FASTING STATUS PATIENT QL REPORTED: YES
HDLC SERPL-MCNC: 33 MG/DL
LDLC SERPL CALC-MCNC: 64 MG/DL
NONHDLC SERPL-MCNC: 91 MG/DL
TRIGL SERPL-MCNC: 137 MG/DL

## 2024-07-03 PROCEDURE — 80061 LIPID PANEL: CPT

## 2024-07-03 PROCEDURE — 36415 COLL VENOUS BLD VENIPUNCTURE: CPT

## 2024-07-03 RX ORDER — ATORVASTATIN CALCIUM 80 MG/1
80 TABLET, FILM COATED ORAL DAILY
Qty: 90 TABLET | Refills: 3 | Status: SHIPPED | OUTPATIENT
Start: 2024-07-03

## 2024-07-03 NOTE — TELEPHONE ENCOUNTER
Patient had labs collected today. Prescription with refills sent in for patient.    Leighann Rosario, RN, BSN  Cardiology RN Care Coordinator   [place]   Phone: [phone]  Fax: [phone] ([place]) [phone] ([place])

## 2024-07-05 ENCOUNTER — TELEPHONE (OUTPATIENT)
Dept: CARDIOLOGY | Facility: CLINIC | Age: 59
End: 2024-07-05
Payer: COMMERCIAL

## 2024-07-05 RX ORDER — METOPROLOL SUCCINATE 50 MG/1
TABLET, EXTENDED RELEASE ORAL
Qty: 90 TABLET | Refills: 0 | Status: SHIPPED | OUTPATIENT
Start: 2024-07-05 | End: 2024-07-22

## 2024-07-05 RX ORDER — LISINOPRIL 10 MG/1
10 TABLET ORAL DAILY
Qty: 90 TABLET | Refills: 0 | Status: SHIPPED | OUTPATIENT
Start: 2024-07-05 | End: 2024-07-26

## 2024-07-05 NOTE — TELEPHONE ENCOUNTER
Name from pharmacy: METOPROLOL ER SUCCINATE 50MG TABS         Will file in chart as: metoprolol succinate ER (TOPROL XL) 50 MG 24 hr tablet     Possible duplicate: Hover to review recent actions on this medication    Sig: TAKE 1 TABLET BY MOUTH DAILY. CALL CLINIC TO SCHEDULE FOLLOW UP APPOINTMENT AND LABS [phone]    Disp: 90 tablet    Refills: 0 (Pharmacy requested: Not specified)    Start: 6/26/2024    Class: E-Prescribe    Non-formulary For: NSTEMI (non-ST elevated myocardial infarction) (H); Coronary artery disease involving native heart without angina pectoris, unspecified vessel or lesion type    Last ordered: 4 months ago (3/4/2024) by Haily Lorenz MD    Last refill: 3/19/2024    Rx #: [id number]    Beta-Blockers Protocol Gvzlhy4607/04/2024 07:28 PM   Protocol Details Blood pressure under 140/90 in past 12 months    Recent (12 mo) or future (90 days) visit within the authorizing provider's specialty    Patient is age 6 or older    Medication is active on med list    Medication indicated for associated diagnosis       Name from pharmacy: LISINOPRIL 10MG TABLETS         Will file in chart as: lisinopril (ZESTRIL) 10 MG tablet     Possible duplicate: Hover to review recent actions on this medication    Sig: TAKE 1 TABLET BY MOUTH DAILY. CALL CLINIC TO SCHEDULE FOLLOW UP APPOINTMENT AND LABS [phone]    Disp: 90 tablet    Refills: 0 (Pharmacy requested: Not specified)    Start: 6/26/2024    Class: E-Prescribe    For: NSTEMI (non-ST elevated myocardial infarction) (H); Coronary artery disease involving native heart without angina pectoris, unspecified vessel or lesion type; Essential hypertension    Last ordered: 4 months ago (3/4/2024) by Haily Lorenz MD    Last refill: 3/20/2024    Rx #: [id number]    ACE Inhibitors (Including Combos) Protocol Mvjedt5007/04/2024 07:28 PM   Protocol Details Blood pressure under 140/90 in past 12 months- Clinicial or Patient Reported    Recent (12 mo) or future  (90 days) visit within the authorizing provider's specialty    Medication is active on med list    Medication indicated for associated diagnosis    Has GFR on file in past 12 months and most recent value is normal    Patient is age 18 or older    Normal serum potassium on file in past 12 months      To be filled at: [facility] #[id number] [facility] [address] AT [place] OF [address]             Patient saw Dr. Lorenz on 12/6/22. Patient was to continue lisinopril 10 mg daily and Metoprolol 50 mg daily. Patient was to follow up in 2 years. Patient due for follow up. Carolina refill sent for patient.     Leighann Rosario, RN, BSN  Cardiology RN Care Coordinator   [place]   Phone: [phone]  Fax: [phone] ([place]) [phone] ([place])

## 2024-07-05 NOTE — TELEPHONE ENCOUNTER
Sent [employer] message to patient to schedule follow up in December for future refills of metoprolol succinate ER 50 mg tabs.     Chichi Recinos CMA ([facility])

## 2024-07-05 NOTE — TELEPHONE ENCOUNTER
metoprolol succinate ER (TOPROL XL) 50 MG 24 hr tablet 90 tablet 0 7/5/2024   REFILLED 7/5/24    spironolactone (ALDACTONE) 25 MG tablet 90 tablet 0 3/4/2024     Diuretics (Including Combos) Protocol Gnfifv3907/02/2024 05:51 PM   Protocol Details Blood pressure under 140/90 in past 12 months    Recent (12 mo) or future (90 days) visit within the authorizing provider's specialty       furosemide (LASIX) 40 MG tablet 90 tablet 0 3/4/2024     Diuretics (Including Combos) Protocol Zupfqi9507/02/2024 05:51 PM   Protocol Details Blood pressure under 140/90 in past 12 months    Recent (12 mo) or future (90 days) visit within the authorizing provider's specialty     BP Readings from Last 3 Encounters:   12/06/22 122/77   09/21/21 120/80   08/18/20 (!) 176/80       lisinopril (ZESTRIL) 10 MG tablet 90 tablet 0 7/5/2024   REFILLED 7/5/24      Last Office Visit: 12/6/22  Future Office visit:   NONE    Routing refill request to provider for review/approval because:  OVERDUE FOR APPT > 18 MONTHS  NO RECENT BP READINGS    Tammi Bowles RN  P Red Flag Triage/MRT

## 2024-07-05 NOTE — TELEPHONE ENCOUNTER
Health Call Center    Phone Message    May a detailed message be left on voicemail: yes     Reason for Call: Other: Sarbjit called to inform his care team that he will be going on vacation on Sunday and needs the medications that he put in a request for on Tuesday before he leaves. Please reach out to Sarbjit with an update on his medications. Thank you!     Action Taken: Other: Cardiology    Travel Screening: Not Applicable    Thank you!  [facility]      Date of Service:

## 2024-07-05 NOTE — TELEPHONE ENCOUNTER
metoprolol succinate ER (TOPROL XL) 50 MG 24 hr tablet 90 tablet 0 3/4/2024       Beta-Blockers Protocol Iompiy4706/26/2024 02:33 PM   Protocol Details Blood pressure under 140/90 in past 12 months    Recent (12 mo) or future (90 days) visit within the authorizing provider's specialty            lisinopril (ZESTRIL) 10 MG tablet 90 tablet 0 3/4/2024       ACE Inhibitors (Including Combos) Protocol Rqangj0606/26/2024 02:33 PM   Protocol Details Blood pressure under 140/90 in past 12 months- Clinicial or Patient Reported    Recent (12 mo) or future (90 days) visit within the authorizing provider's specialty        BP Readings from Last 3 Encounters:   12/06/22 122/77   09/21/21 120/80   08/18/20 (!) 176/80       Last Office Visit: 12/6/22  Future Office visit:   none    Routing refill request to provider for review/approval because:  Already sent 90 day earl refill with instructions to make a follow up appt.     Tammi Bowles RN  P Red Flag Triage/MRT

## 2024-07-08 RX ORDER — FUROSEMIDE 40 MG
TABLET ORAL
Qty: 90 TABLET | Refills: 0 | OUTPATIENT
Start: 2024-07-08

## 2024-07-08 RX ORDER — SPIRONOLACTONE 25 MG/1
25 TABLET ORAL DAILY
Qty: 90 TABLET | Refills: 0 | Status: SHIPPED | OUTPATIENT
Start: 2024-07-08

## 2024-07-08 RX ORDER — FUROSEMIDE 40 MG
40 TABLET ORAL DAILY
Qty: 90 TABLET | Refills: 0 | Status: SHIPPED | OUTPATIENT
Start: 2024-07-08

## 2024-07-08 NOTE — TELEPHONE ENCOUNTER
metoprolol succinate ER (TOPROL XL) 50 MG 24 hr tablet          The original prescription was reordered on 7/5/2024 by Leighann Rosario, CRESC[facility]IO. Renewing this prescription may not be appropriate.     Possible duplicate: Hover to review recent actions on this medication    Sig: Take 1 tablet (50 mg) by mouth daily Call clinic to schedule follow up appointment and labs. [phone]    Disp: 90 tablet    Refills: 0    Start: 7/2/2024    Class: E-Prescribe    For: NSTEMI (non-ST elevated myocardial infarction) (H); Coronary artery disease involving native heart without angina pectoris, unspecified vessel or lesion type    Last ordered: 4 months ago (3/4/2024) by Haily Lorenz MD    Beta-Blockers Protocol Eaglfp8307/05/2024 05:32 PM   Protocol Details Blood pressure under 140/90 in past 12 months    Recent (12 mo) or future (90 days) visit within the authorizing provider's specialty    Patient is age 6 or older    Medication is active on med list    Medication indicated for associated diagnosis       spironolactone (ALDACTONE) 25 MG tablet         Sig: Take 1 tablet (25 mg) by mouth daily Call clinic to schedule follow up appointment and labs. [phone]    Disp: 90 tablet    Refills: 0    Start: 7/2/2024    Class: E-Prescribe    For: Essential hypertension    Last ordered: 4 months ago (3/4/2024) by Haily Lorenz MD    Diuretics (Including Combos) Protocol Iwcsiu6807/05/2024 05:32 PM   Protocol Details Blood pressure under 140/90 in past 12 months    Recent (12 mo) or future (90 days) visit within the authorizing provider's specialty    Medication is active on med list    Has GFR on file in past 12 months and most recent value is normal    Medication indicated for associated diagnosis    Medication indicated for associated diagnosis    Patient is age 18 or older       furosemide (LASIX) 40 MG tablet          Possible duplicate: Hover to review recent actions on this medication    Sig: Take 1 tablet (40 mg) by mouth daily    Disp: 90  tablet    Refills: 0    Start: 7/2/2024    Class: E-Prescribe    For: Essential hypertension    Last ordered: 4 months ago (3/4/2024) by Haily Lorenz MD    Diuretics (Including Combos) Protocol Mlsgih6907/05/2024 05:32 PM   Protocol Details Blood pressure under 140/90 in past 12 months    Recent (12 mo) or future (90 days) visit within the authorizing provider's specialty    Medication is active on med list    Has GFR on file in past 12 months and most recent value is normal    Medication indicated for associated diagnosis    Medication indicated for associated diagnosis    Patient is age 18 or older       lisinopril (ZESTRIL) 10 MG tablet         The original prescription was reordered on 7/5/2024 by Leighann Rosario, CRESCENCIO. Renewing this prescription may not be appropriate.     Possible duplicate: Hover to review recent actions on this medication    Sig: Take 1 tablet (10 mg) by mouth daily Call clinic to schedule follow up appointment and labs. [phone]    Disp: 90 tablet    Refills: 0    Start: 7/2/2024    Class: E-Prescribe    For: NSTEMI (non-ST elevated myocardial infarction) (H); Coronary artery disease involving native heart without angina pectoris, unspecified vessel or lesion type; Essential hypertension    Last ordered: 4 months ago (3/4/2024) by Haily Lorenz MD    ACE Inhibitors (Including Combos) Protocol Polcym4207/05/2024 05:32 PM   Protocol Details Blood pressure under 140/90 in past 12 months- Clinicial or Patient Reported    Recent (12 mo) or future (90 days) visit within the authorizing provider's specialty    Medication is active on med list    Medication indicated for associated diagnosis    Has GFR on file in past 12 months and most recent value is normal    Patient is age 18 or older    Normal serum potassium on file in past 12 months      To be filled at: [id number] - [facility] [address] [place], [place] [address]E [place] AT [facility]     Patient saw Dr. Lorenz on 12/6/22. Patient was to follow  up in 2 years. Patient was to continue lasix 40 mg daily, spironolactone 25 mg daily, lisinopril 10 mg daily, and metoprolol 50 mg daily. Metoprolol 50 mg was sent in on 7/5/24. Lisinopril 10 mg was sent on 7/5/24. Carolina refill for spironolactone and lasix was sent in for patient.     Attempted to call patient. Left message for patient.      Leighann Rosario RN, BSN  Cardiology RN Care Coordinator   [place]   Phone: [phone]  Fax: [phone] ([place]) [phone] ([place])

## 2024-07-08 NOTE — TELEPHONE ENCOUNTER
Attempted to call patient.  Message left for patient. See refill encounter for further follow up.    Leighann Rosario RN, BSN  Cardiology RN Care Coordinator   [place]   Phone: [phone]  Fax: [phone] ([place]) [phone] ([place])

## 2024-07-10 NOTE — TELEPHONE ENCOUNTER
Patient saw [facility] message. No response at this time.    Leighann Rosario, RN, BSN  Cardiology RN Care Coordinator   [place]   Phone: [phone]  Fax: [phone] ([place]) [phone] ([place])

## 2024-07-19 NOTE — TELEPHONE ENCOUNTER
Patient read [facility] message. Called and LVM for patient to return call to clinic scheduler to schedule follow-up with Dr. Lorenz in December. Stated this appointment is necessary for future refills of cardiac medications.    NANETTE Bello

## 2024-07-26 RX ORDER — LISINOPRIL 10 MG/1
10 TABLET ORAL DAILY
Qty: 90 TABLET | Refills: 1 | Status: SHIPPED | OUTPATIENT
Start: 2024-07-26

## 2024-07-26 NOTE — TELEPHONE ENCOUNTER
Checked patient chart. Patient is scheduled for follow-up with Dr. Lorenz 12/4/24.    NANETTE Bello

## 2024-07-26 NOTE — TELEPHONE ENCOUNTER
Prescription sent in for metoprolol already. Lisinopril sent in with refill to cover until patient comes in for appointment on 12/4/24.     Leighann Rosario, RN, BSN  Cardiology RN Care Coordinator   [place]   Phone: [phone]  Fax: [phone] ([place]) [phone] ([place])

## 2024-10-10 DIAGNOSIS — I10 ESSENTIAL HYPERTENSION: Primary | ICD-10-CM

## 2024-10-16 RX ORDER — FUROSEMIDE 40 MG/1
40 TABLET ORAL DAILY
Qty: 90 TABLET | Refills: 0 | Status: SHIPPED | OUTPATIENT
Start: 2024-10-16

## 2024-10-16 RX ORDER — SPIRONOLACTONE 25 MG/1
25 TABLET ORAL DAILY
Qty: 90 TABLET | Refills: 0 | Status: SHIPPED | OUTPATIENT
Start: 2024-10-16

## 2024-10-18 DIAGNOSIS — I25.10 CORONARY ARTERY DISEASE INVOLVING NATIVE HEART WITHOUT ANGINA PECTORIS, UNSPECIFIED VESSEL OR LESION TYPE: ICD-10-CM

## 2024-10-18 DIAGNOSIS — I21.4 NSTEMI (NON-ST ELEVATED MYOCARDIAL INFARCTION) (H): ICD-10-CM

## 2024-10-24 NOTE — TELEPHONE ENCOUNTER
METOPROLOL ER SUCCINATE 50MG TABS       Last Written Prescription Date:  7-22-24  Last Fill Quantity: 90,   # refills: 0  Last Office Visit : 12-6-22 ( RTC 2 Y)  Future Office visit:  12-4-24    Routing refill request to provider for review/approval because:  Past due BP check, previous earl rf

## 2024-10-25 RX ORDER — METOPROLOL SUCCINATE 50 MG/1
50 TABLET, EXTENDED RELEASE ORAL DAILY
Qty: 90 TABLET | Refills: 0 | Status: SHIPPED | OUTPATIENT
Start: 2024-10-25

## 2024-10-25 NOTE — TELEPHONE ENCOUNTER
Name from pharmacy: METOPROLOL ER SUCCINATE 50MG TABS          Will file in chart as: metoprolol succinate ER (TOPROL XL) 50 MG 24 hr tablet    Sig: TAKE 1 TABLET(50 MG) BY MOUTH DAILY    Disp: 90 tablet    Refills: Not specified    Start: 10/24/2024    Class: E-Prescribe    For: NSTEMI (non-ST elevated myocardial infarction) (H); Coronary artery disease involving native heart without angina pectoris, unspecified vessel or lesion type    Last ordered: 3 months ago (7/22/2024) by Haily Lorenz MD    Last refill: 7/22/2024    Rx #: [id number]    Beta-Blockers Protocol Yuvzry37/24/2024 01:16 PM   Protocol Details Most recent blood pressure under 140/90 in past 12 months    Recent (12 mo) or future (90 days) visit within the authorizing provider's specialty    Patient is age 6 or older    Medication is active on med list    Medication indicated for associated diagnosis      To be filled at: [facility][id number] [address] AT [address]       Patient saw Dr. Lorenz on 12/6/22. Patient was to continue metoprolol 50 mg daily. Patient to follow up in 2 years. Patient scheduled for appointment on 12/4/24. Carolina refill sent in for patient to cover until upcoming appointment.    Leighann Rosario, RN, BSN  Cardiology RN Care Coordinator   [place]   Phone: [phone]  Fax: [phone] ([place]) [phone] ([place])

## 2024-12-04 ENCOUNTER — OFFICE VISIT (OUTPATIENT)
Dept: CARDIOLOGY | Facility: CLINIC | Age: 59
End: 2024-12-04
Payer: COMMERCIAL

## 2024-12-04 VITALS
WEIGHT: 315 LBS | BODY MASS INDEX: 71.62 KG/M2 | OXYGEN SATURATION: 92 % | HEART RATE: 112 BPM | DIASTOLIC BLOOD PRESSURE: 74 MMHG | SYSTOLIC BLOOD PRESSURE: 122 MMHG

## 2024-12-04 DIAGNOSIS — I10 ESSENTIAL HYPERTENSION: ICD-10-CM

## 2024-12-04 DIAGNOSIS — I21.4 NSTEMI (NON-ST ELEVATED MYOCARDIAL INFARCTION) (H): Primary | ICD-10-CM

## 2024-12-04 DIAGNOSIS — E66.01 MORBID OBESITY (H): ICD-10-CM

## 2024-12-04 DIAGNOSIS — Z95.5 HISTORY OF PLACEMENT OF STENT IN LAD CORONARY ARTERY: ICD-10-CM

## 2024-12-04 NOTE — NURSING NOTE
"Chief Complaint   Patient presents with    RECHECK     Return general cardiology for 2 year follow-up    Shortness of Breath       Initial BP (!) 141/72 (BP Location: Right arm, Patient Position: Chair, Cuff Size: Adult Large)   Pulse (!) 144   Wt (!) 220 kg (485 lb)   SpO2 92%   BMI 71.62 kg/m   Estimated body mass index is 71.62 kg/m  as calculated from the following:    Height as of 12/6/22: 1.753 m (5' 9\").    Weight as of this encounter: 220 kg (485 lb)..  BP completed using cuff size: large/forearm    VINOD Cook    "

## 2024-12-04 NOTE — PROGRESS NOTES
[facility]      HPI: Mr. Sarbjit Tran is a 56 year old  male with PMH significant for    -Non-STEMI, status post PCI to LAD 4/2019  -Morbid obesity (body mass index 70)    Patient is being seen today for routine annual follow-up.  He was last seen a year ago.    Patient was admitted to [facility] in April 2019 with chest pain and non-STEMI.  Coronary angiogram showed proximal LAD 99% stenosis.  Minimal CAD in circumflex and RCA.  Underwent drug-eluting stent to LAD.  Most recent echocardiogram 7/2020 showed normal LVEF 55 to 60%, normal RV function with no significant valve disease.  When he had non-STEMI in 2019 his EF was low at 40 to 45% which later recovered.  Overall feeling well.  No chest pain, palpitations.  Short of breath with activity.  Sometimes dizzy and lightheaded when changing positions.  Reports lower extremity edema.  No significant change in his symptoms over the last 1 year.    Pertinent Cardiac Medications:  40mg lasix daily  10mg lisinopril daily  40mg Lipitor daily  25mg spironolactone daily  50mg metoprolol XL daily  81mg ASA daily    Medications, personal, family, and social history reviewed with patient and revised.    Interval history 12/4/2024:  Patient is being seen today for 2-year follow-up.  He tells me that he is at his baseline shortness of breath with activity.  His BMI is 69.  He is sedentary.  Denies chest pain.  Lower extremity edema has been stable.  He is on Lasix and spironolactone.  He tells me his blood pressure is normal at home.  He has not had any cardiac events over the last 2 years.  He sleeps on a recliner.  He has sleep apnea but does not use CPAP machine because of that.    PAST MEDICAL HISTORY:  Past Medical History:   Diagnosis Date    Abscess     Coronary artery disease     JEREL to proximal LAD on 4/30/19    Dyslipidemia     Hypertension     NSTEMI (non-ST elevated  myocardial infarction) (H) 04/30/2019    JEREL to proximal LAD    Obesity     Sleep apnea     NEVER BEEN DIAGNOSED       CURRENT MEDICATIONS:  Current Outpatient Medications   Medication Sig Dispense Refill    acetaminophen (TYLENOL) 325 MG tablet Take 975 mg by mouth daily with Ibuprofen      aspirin 81 MG EC tablet Take 1 tablet (81 mg) by mouth daily 90 tablet 3    atorvastatin (LIPITOR) 80 MG tablet Take 1 tablet (80 mg) by mouth daily Lab due for refill 90 tablet 3    calcium carbonate 600 mg-vitamin D 400 units (CALTRATE) 600-400 MG-UNIT per tablet Take 1 tablet by mouth daily      furosemide (LASIX) 40 MG tablet Take 1 tablet (40 mg) by mouth daily. 90 tablet 0    lisinopril (ZESTRIL) 10 MG tablet Take 1 tablet (10 mg) by mouth daily 90 tablet 1    metoprolol succinate ER (TOPROL XL) 50 MG 24 hr tablet TAKE 1 TABLET(50 MG) BY MOUTH DAILY 90 tablet 0    multivitamin w/minerals (THERA-VIT-M) tablet Take 2 tablets by mouth daily      nitroGLYcerin (NITROSTAT) 0.4 MG sublingual tablet For chest pain place 1 tablet under the tongue every 5 minutes for 3 doses. If symptoms persist 5 minutes after 1st dose call 911. (Patient not taking: Reported on 7/16/2020) 30 tablet 0    NONFORMULARY Take 1 tablet by mouth 2 times daily -  CIRCULEG - combination nutritional supplement to improve circulation      order for DME Equipment being ordered: Walker ()  Treatment Diagnosis: morbid obesity 1 Device 0    spironolactone (ALDACTONE) 25 MG tablet Take 1 tablet (25 mg) by mouth daily. 90 tablet 0    vitamin D3 (CHOLECALCIFEROL) 1000 units (25 mcg) tablet Take 2,000 Units by mouth daily         PAST SURGICAL HISTORY:  Past Surgical History:   Procedure Laterality Date    CORONARY ANGIOGRAPHY ADULT ORDER  04/30/2019    JEREL to proximal LAD    CV HEART CATHETERIZATION WITH POSSIBLE INTERVENTION N/A 4/30/2019    JEREL to proximal LAD    EXCISE LESION TRUNK  5/11/2012    Procedure:EXCISE LESION TRUNK; excision back cyst;  Surgeon:ELENA LEE; Location:[facility]    ORTHOPEDIC SURGERY      >15 on right hand  / 1on left       ALLERGIES:   No Known Allergies    FAMILY HISTORY:  Family History   Problem Relation Age of Onset    Hyperlipidemia Father         on statins    Coronary Artery Disease Paternal Grandfather         CABG         SOCIAL HISTORY:  Social History     Tobacco Use    Smoking status: Never    Smokeless tobacco: Never   Substance Use Topics    Alcohol use: Yes     Comment: 5 drinks/YEAR    Drug use: No       ROS:   Constitutional: No fever, chills, or sweats. Weight stable.   Cardiovascular: As per HPI.       Exam:  BP (!) 141/72 (BP Location: Right arm, Patient Position: Chair, Cuff Size: Adult Large)   Pulse (!) 144   Wt (!) 220 kg (485 lb)   SpO2 92%   BMI 71.62 kg/m    GENERAL APPEARANCE: alert and no distress  HEENT: no icterus, no central cyanosis  LYMPH/NECK: no adenopathy, no asymmetry  RESPIRATORY: lungs clear to auscultation - no rales, rhonchi or wheezes, no use of accessory muscles, no retractions, respirations are unlabored, normal respiratory rate  CARDIOVASCULAR: regular rhythm, normal S1, S2, no S3 or S4 and no murmur, click or rub, precordium quiet with normal PMI.  EXTREMITIES:trace edema  NEURO: alert, normal speech,and affect  SKIN: no ecchymoses, no rashes     I have reviewed the labs and personally reviewed the imaging below and made my comment in the assessment and plan.    Labs:  CBC RESULTS:   Lab Results   Component Value Date    WBC 8.5 05/01/2019    RBC 4.06 (L) 05/01/2019    HGB 12.1 (L) 05/01/2019    HCT 37.2 (L) 05/01/2019    MCV 92 05/01/2019    MCH 29.8 05/01/2019    MCHC 32.5 05/01/2019    RDW 13.9 05/01/2019     05/01/2019       BMP RESULTS:  Lab Results   Component Value Date     03/20/2024     08/31/2020    POTASSIUM 3.9 03/20/2024    POTASSIUM 3.8 09/21/2021    POTASSIUM 4.1 08/31/2020    CHLORIDE 102 03/20/2024    CHLORIDE 106 09/21/2021    CHLORIDE 107  08/31/2020    CO2 20 (L) 03/20/2024    CO2 24 09/21/2021    CO2 20 08/31/2020    ANIONGAP 19 (H) 03/20/2024    ANIONGAP 8 09/21/2021    ANIONGAP 10 08/31/2020     (H) 03/20/2024     (H) 09/21/2021     (H) 08/31/2020    BUN 18.3 03/20/2024    BUN 21 09/21/2021    BUN 27 08/31/2020    CR 0.92 03/20/2024    CR 1.04 08/31/2020    GFRESTIMATED >90 03/20/2024    GFRESTIMATED 80 08/31/2020    GFRESTBLACK >90 08/31/2020    DENG 9.5 03/20/2024    DENG 9.1 08/31/2020     Coronary Angiogram 4/30/2019:  NSTEMI with ongoing chest pain.  ELLIE 2 flow in LAD  PCI of mid LAD with JEREL, improved distal vessel to ELLIE 3 flow.   No compromise of large diagonal branch.     Echocardiogram 7/2020:  Technically difficult study.Extremely difficult acoustic windows despite the  use of contrast for endocardial border definition.  Global and regional left ventricular function is probably normal with an EF of  55-60%.  The right ventricle is difficult to assess. Likely normal in function.  This study was compared with the study from 4/30/2019. The distal wall motion  abnormalities appear improved on limited views.     Assessment and Plan:   Sarbjit Tran is a 58 year old male with PMH notable for CAD s/p PCI LAD 4/2019, hx HFrEF (ischemic cardiomyopathy) - now recovered, HTN, HLD, and obesity. He presents for 2-year follow up.   No chest pain.  MONDRAGON at baseline. Reports mild edema in his legs.  Stable on Lasix and spironolactone.     # CAD s/p PCI to LAD 4/2019  # Hx of Ischemic Cardiomyopathy, HFrEF - EF recovered  # HFpEF NYHA functional class II  -Continue current treatment with aspirin 81, atorvastatin 80, Lasix 40 mg, lisinopril 10, metoprolol 50 and spironolactone 25 mg.     # HTN  -Well-controlled    # HLD  -Atorvastatin 80 mg.  LDL well-controlled.    # Morbid obesity  # Prediabetes  -Will check if his insurance is covering Ozempic.    Return to clinic 1 year with SIMA.  I will see patient 2 years.    Total time spent  today for this visit is 35 minutes including precharting, face-to-face clinic visit, review of labs/imaging and medical documentation.    Haily FARRAR MD  [facility] Division of Cardiology  Pager [phone]

## 2024-12-04 NOTE — PATIENT INSTRUCTIONS
Thank you for coming to the [facility]; please note the following instructions:    1.  Recheck blood pressure today    2. Follow up with Cici in 1 year    3. Follow up with Dr. Lorenz in 2 years    4. We are going to look into how much Ozempic may cost for you. If it is affordable and you are interested, we can then pursue getting you set up for a referral to discuss Ozempic.         If you have any questions regarding your visit please contact your care team:     Cardiology  Telephone Number   Tracie BISWAS, RN  Leighann BATRES, RN  Ligia ROY, RN  Griselda PRADO, RMA  Grazyna REDMAN, RMA  Inge FISHER, Visit Facilitator  Chichi BALDWIN [facility] [phone] (option 1)   For scheduling appts:     [phone] (select option 1)       For the Device Clinic (Pacemakers and ICD's)  RN's :  Crystal Mejias   During business hours: [phone]    *After business hours:  [phone] (select option 4)      Normal test result notifications will be released via [employer] or mailed within 7 business days.  All other test results, will be communicated via telephone once reviewed by your cardiologist.    If you need a medication refill please contact your pharmacy.  Please allow 3 business days for your refill to be completed.    As always, thank you for trusting us with your health care needs!

## 2024-12-04 NOTE — LETTER
12/4/2024      RE: Sarbjit Tran  [address] [address]       Dear Colleague,    Thank you for the opportunity to participate in the care of your patient, Sarbjit Tran, at the [facility]. Please see a copy of my visit note below.                                                                          [facility]      HPI: Mr. Sarbjit Tran is a 56 year old  male with PMH significant for    -Non-STEMI, status post PCI to LAD 4/2019  -Morbid obesity (body mass index 70)    Patient is being seen today for routine annual follow-up.  He was last seen a year ago.    Patient was admitted to [facility] in April 2019 with chest pain and non-STEMI.  Coronary angiogram showed proximal LAD 99% stenosis.  Minimal CAD in circumflex and RCA.  Underwent drug-eluting stent to LAD.  Most recent echocardiogram 7/2020 showed normal LVEF 55 to 60%, normal RV function with no significant valve disease.  When he had non-STEMI in 2019 his EF was low at 40 to 45% which later recovered.  Overall feeling well.  No chest pain, palpitations.  Short of breath with activity.  Sometimes dizzy and lightheaded when changing positions.  Reports lower extremity edema.  No significant change in his symptoms over the last 1 year.    Pertinent Cardiac Medications:  40mg lasix daily  10mg lisinopril daily  40mg Lipitor daily  25mg spironolactone daily  50mg metoprolol XL daily  81mg ASA daily    Medications, personal, family, and social history reviewed with patient and revised.    Interval history 12/4/2024:  Patient is being seen today for 2-year follow-up.  He tells me that he is at his baseline shortness of breath with activity.  His BMI is 69.  He is sedentary.  Denies chest pain.  Lower extremity edema has been stable.  He is on Lasix and spironolactone.  He tells me his blood pressure is normal at home.  He  has not had any cardiac events over the last 2 years.  He sleeps on a recliner.  He has sleep apnea but does not use CPAP machine because of that.    PAST MEDICAL HISTORY:  Past Medical History:   Diagnosis Date     Abscess      Coronary artery disease     JEREL to proximal LAD on 4/30/19     Dyslipidemia      Hypertension      NSTEMI (non-ST elevated myocardial infarction) (H) 04/30/2019    JEREL to proximal LAD     Obesity      Sleep apnea     NEVER BEEN DIAGNOSED       CURRENT MEDICATIONS:  Current Outpatient Medications   Medication Sig Dispense Refill     acetaminophen (TYLENOL) 325 MG tablet Take 975 mg by mouth daily with Ibuprofen       aspirin 81 MG EC tablet Take 1 tablet (81 mg) by mouth daily 90 tablet 3     atorvastatin (LIPITOR) 80 MG tablet Take 1 tablet (80 mg) by mouth daily Lab due for refill 90 tablet 3     calcium carbonate 600 mg-vitamin D 400 units (CALTRATE) 600-400 MG-UNIT per tablet Take 1 tablet by mouth daily       furosemide (LASIX) 40 MG tablet Take 1 tablet (40 mg) by mouth daily. 90 tablet 0     lisinopril (ZESTRIL) 10 MG tablet Take 1 tablet (10 mg) by mouth daily 90 tablet 1     metoprolol succinate ER (TOPROL XL) 50 MG 24 hr tablet TAKE 1 TABLET(50 MG) BY MOUTH DAILY 90 tablet 0     multivitamin w/minerals (THERA-VIT-M) tablet Take 2 tablets by mouth daily       nitroGLYcerin (NITROSTAT) 0.4 MG sublingual tablet For chest pain place 1 tablet under the tongue every 5 minutes for 3 doses. If symptoms persist 5 minutes after 1st dose call 911. (Patient not taking: Reported on 7/16/2020) 30 tablet 0     NONFORMULARY Take 1 tablet by mouth 2 times daily -  CIRCULEG - combination nutritional supplement to improve circulation       order for DME Equipment being ordered: Walker ()  Treatment Diagnosis: morbid obesity 1 Device 0     spironolactone (ALDACTONE) 25 MG tablet Take 1 tablet (25 mg) by mouth daily. 90 tablet 0     vitamin D3 (CHOLECALCIFEROL) 1000 units (25 mcg) tablet Take 2,000  Units by mouth daily         PAST SURGICAL HISTORY:  Past Surgical History:   Procedure Laterality Date     CORONARY ANGIOGRAPHY ADULT ORDER  04/30/2019    JEREL to proximal LAD     CV HEART CATHETERIZATION WITH POSSIBLE INTERVENTION N/A 4/30/2019    JEREL to proximal LAD     EXCISE LESION TRUNK  5/11/2012    Procedure:EXCISE LESION TRUNK; excision back cyst; Surgeon:ELENA LEE; Location:[facility]     ORTHOPEDIC SURGERY      >15 on right hand  / 1on left       ALLERGIES:   No Known Allergies    FAMILY HISTORY:  Family History   Problem Relation Age of Onset     Hyperlipidemia Father         on statins     Coronary Artery Disease Paternal Grandfather         CABG         SOCIAL HISTORY:  Social History     Tobacco Use     Smoking status: Never     Smokeless tobacco: Never   Substance Use Topics     Alcohol use: Yes     Comment: 5 drinks/YEAR     Drug use: No       ROS:   Constitutional: No fever, chills, or sweats. Weight stable.   Cardiovascular: As per HPI.       Exam:  BP (!) 141/72 (BP Location: Right arm, Patient Position: Chair, Cuff Size: Adult Large)   Pulse (!) 144   Wt (!) 220 kg (485 lb)   SpO2 92%   BMI 71.62 kg/m    GENERAL APPEARANCE: alert and no distress  HEENT: no icterus, no central cyanosis  LYMPH/NECK: no adenopathy, no asymmetry  RESPIRATORY: lungs clear to auscultation - no rales, rhonchi or wheezes, no use of accessory muscles, no retractions, respirations are unlabored, normal respiratory rate  CARDIOVASCULAR: regular rhythm, normal S1, S2, no S3 or S4 and no murmur, click or rub, precordium quiet with normal PMI.  EXTREMITIES:trace edema  NEURO: alert, normal speech,and affect  SKIN: no ecchymoses, no rashes     I have reviewed the labs and personally reviewed the imaging below and made my comment in the assessment and plan.    Labs:  CBC RESULTS:   Lab Results   Component Value Date    WBC 8.5 05/01/2019    RBC 4.06 (L) 05/01/2019    HGB 12.1 (L) 05/01/2019    HCT 37.2 (L) 05/01/2019     MCV 92 05/01/2019    MCH 29.8 05/01/2019    MCHC 32.5 05/01/2019    RDW 13.9 05/01/2019     05/01/2019       BMP RESULTS:  Lab Results   Component Value Date     03/20/2024     08/31/2020    POTASSIUM 3.9 03/20/2024    POTASSIUM 3.8 09/21/2021    POTASSIUM 4.1 08/31/2020    CHLORIDE 102 03/20/2024    CHLORIDE 106 09/21/2021    CHLORIDE 107 08/31/2020    CO2 20 (L) 03/20/2024    CO2 24 09/21/2021    CO2 20 08/31/2020    ANIONGAP 19 (H) 03/20/2024    ANIONGAP 8 09/21/2021    ANIONGAP 10 08/31/2020     (H) 03/20/2024     (H) 09/21/2021     (H) 08/31/2020    BUN 18.3 03/20/2024    BUN 21 09/21/2021    BUN 27 08/31/2020    CR 0.92 03/20/2024    CR 1.04 08/31/2020    GFRESTIMATED >90 03/20/2024    GFRESTIMATED 80 08/31/2020    GFRESTBLACK >90 08/31/2020    DENG 9.5 03/20/2024    DENG 9.1 08/31/2020     Coronary Angiogram 4/30/2019:  NSTEMI with ongoing chest pain.  ELLIE 2 flow in LAD  PCI of mid LAD with JEREL, improved distal vessel to ELLIE 3 flow.   No compromise of large diagonal branch.     Echocardiogram 7/2020:  Technically difficult study.Extremely difficult acoustic windows despite the  use of contrast for endocardial border definition.  Global and regional left ventricular function is probably normal with an EF of  55-60%.  The right ventricle is difficult to assess. Likely normal in function.  This study was compared with the study from 4/30/2019. The distal wall motion  abnormalities appear improved on limited views.     Assessment and Plan:   Sarbjit Tran is a 58 year old male with PMH notable for CAD s/p PCI LAD 4/2019, hx HFrEF (ischemic cardiomyopathy) - now recovered, HTN, HLD, and obesity. He presents for 2-year follow up.   No chest pain.  MONDRAGON at baseline. Reports mild edema in his legs.  Stable on Lasix and spironolactone.     # CAD s/p PCI to LAD 4/2019  # Hx of Ischemic Cardiomyopathy, HFrEF - EF recovered  # HFpEF NYHA functional class II  -Continue current  treatment with aspirin 81, atorvastatin 80, Lasix 40 mg, lisinopril 10, metoprolol 50 and spironolactone 25 mg.     # HTN  -Well-controlled    # HLD  -Atorvastatin 80 mg.  LDL well-controlled.    # Morbid obesity  # Prediabetes  -Will check if his insurance is covering Ozempic.    Return to clinic 1 year with SIMA.  I will see patient 2 years.    Total time spent today for this visit is 35 minutes including precharting, face-to-face clinic visit, review of labs/imaging and medical documentation.    Haily FARRAR MD  [facility] Division of Cardiology  Pager [phone]      Please do not hesitate to contact me if you have any questions/concerns.     Sincerely,     Haily Farrar MD

## 2024-12-05 ENCOUNTER — TELEPHONE (OUTPATIENT)
Dept: CARDIOLOGY | Facility: CLINIC | Age: 59
End: 2024-12-05
Payer: COMMERCIAL

## 2024-12-05 NOTE — TELEPHONE ENCOUNTER
30 days supply test claims requested for the drugs below:  Ozempic (0.25mg or 0.5mg) 2mg/3ml  PA required

## 2024-12-16 ENCOUNTER — TELEPHONE (OUTPATIENT)
Dept: CARDIOLOGY | Facility: CLINIC | Age: 59
End: 2024-12-16
Payer: COMMERCIAL

## 2024-12-16 NOTE — TELEPHONE ENCOUNTER
Attempt to call patient to discuss.    Leighann Rosario, RN, BSN  Cardiology RN Care Coordinator   [place]   Phone: [phone]  Fax: [phone] ([place]) [phone] ([place])

## 2024-12-16 NOTE — TELEPHONE ENCOUNTER
----- Message from Haily Lorenz sent at 12/16/2024 12:58 PM CST -----  Regarding: RE: Ozempic  Yes lets see what the pharmacy says thank you.  ----- Message -----  From: Leighann Rosario RN  Sent: 12/16/2024  12:54 PM CST  To: Leighann Rosario RN; Haily Lorenz MD  Subject: FW: Ozempic                                      Patient was seen on 12/4. Plan was to see if we confirm cost of Ozempic for patient. I reached out to see if we could see how much Ozempic would cost before placing an MTM referral for patient. It looks like it would need a Prior Authorization. Do you want me to place a MTM referral still for patient since we can't get a cost estimate? Thanks!  ----- Message -----  From: Cici Brown  Sent: 12/5/2024  11:54 AM CST  To: Leighann Rosario RN  Subject: RE: Ozempic                                      Hello,   I ran a test claim on the Ozempic 2mg/3ml pen injector.  This medication does need a prior authorization.  Unfortunately I am unable to see what the cost would be, would you like me to initiate a PA for this drug?    Most plans require a diagnosis of Type II diabetes with a documented A1c of 6.5 or higher    Thank you!  Cici  ----- Message -----  From: Leighann Rosario RN  Sent: 12/5/2024  11:27 AM CST  To: Leighann Rosario RN; Cici Brown  Subject: Ozempic                                          Antony Bolanos,    This is a strange request, but are you able to do test claims for Ozempic? This patient saw Dr. Lorenz yesterday and she was wondering if there is any way for us to see how much it would cost him before we would work on a MTM referral to help initiate this.     Thanks!    Leighann

## 2024-12-17 NOTE — TELEPHONE ENCOUNTER
Attempted to call patient. Left message for patient.    Leighann Rosario, RN, BSN  Cardiology RN Care Coordinator   [place]   Phone: [phone]  Fax: [phone] ([place]) [phone] ([place])

## 2024-12-18 NOTE — TELEPHONE ENCOUNTER
See rollApp message for further follow up.    Leighann Rosario, RN, BSN  Cardiology RN Care Coordinator   [place]   Phone: [phone]  Fax: [phone] ([place]) [phone] ([place])

## 2024-12-19 ENCOUNTER — TELEPHONE (OUTPATIENT)
Dept: CARDIOLOGY | Facility: CLINIC | Age: 59
End: 2024-12-19
Payer: COMMERCIAL

## 2024-12-20 NOTE — TELEPHONE ENCOUNTER
Attempted to call patient. Left message for patient.    Leighann Rosario, RN, BSN  Cardiology RN Care Coordinator   [place]   Phone: [phone]  Fax: [phone] ([place]) [phone] ([place])

## 2024-12-22 NOTE — TELEPHONE ENCOUNTER
[facility] Call Center    Phone Message    May a detailed message be left on voicemail: yes    Reason for Call: Patient called requesting to speak with a member of his care team in regards to [employer] messages he has sent over. Patient declined to specify further. Please call back to discuss.    Thank you!  [facility]                                                                normal (ped)...

## 2024-12-23 NOTE — TELEPHONE ENCOUNTER
Attempted to call patient. Left message for patient.    Leighann Rosario, RN, BSN  Cardiology RN Care Coordinator   [place]   Phone: [phone]  Fax: [phone] ([place]) [phone] ([place])

## 2024-12-26 NOTE — TELEPHONE ENCOUNTER
Multiple attempts to call patient. Unable to reach patient.    Leighann Rosario, RN, BSN  Cardiology RN Care Coordinator   [place]   Phone: [phone]  Fax: [phone] ([place]) [phone] ([place])

## 2025-03-18 ENCOUNTER — MYC REFILL (OUTPATIENT)
Dept: CARDIOLOGY | Facility: CLINIC | Age: 60
End: 2025-03-18
Payer: COMMERCIAL

## 2025-03-18 DIAGNOSIS — I25.10 CORONARY ARTERY DISEASE INVOLVING NATIVE HEART WITHOUT ANGINA PECTORIS, UNSPECIFIED VESSEL OR LESION TYPE: ICD-10-CM

## 2025-03-18 DIAGNOSIS — I10 ESSENTIAL HYPERTENSION: ICD-10-CM

## 2025-03-18 DIAGNOSIS — I21.4 NSTEMI (NON-ST ELEVATED MYOCARDIAL INFARCTION) (H): ICD-10-CM

## 2025-03-21 RX ORDER — METOPROLOL SUCCINATE 50 MG/1
50 TABLET, EXTENDED RELEASE ORAL DAILY
Qty: 90 TABLET | Refills: 0 | Status: SHIPPED | OUTPATIENT
Start: 2025-03-21

## 2025-03-21 RX ORDER — FUROSEMIDE 40 MG/1
40 TABLET ORAL DAILY
Qty: 90 TABLET | Refills: 0 | Status: SHIPPED | OUTPATIENT
Start: 2025-03-21

## 2025-03-21 NOTE — TELEPHONE ENCOUNTER
Patient due for labs. Lab order placed per refill protocol. [place] refill already sent in.    Leighann Rosario, RN, BSN  Cardiology RN Care Coordinator   [place]   Phone: [phone]  Fax: [phone] ([place]) [phone] ([place])

## 2025-03-21 NOTE — TELEPHONE ENCOUNTER
Last Written Prescription:  furosemide (LASIX) 40 MG tablet 90 tablet 0 10/16/2024  metoprolol succinate ER (TOPROL XL) 50 MG 24 hr tablet 90 tablet 0 10/25/2024  ----------------------  Last Visit Date: 12/4/24  Future Visit Date: none  ----------------------      Refill decision: Medication refilled per  Medication Refill in Ambulatory Care  policy.    Component      Latest Ref Rng 3/20/2024  2:11 PM   Sodium      135 - 145 mmol/L 141    Potassium      3.4 - 5.3 mmol/L 3.9    Chloride      98 - 107 mmol/L 102    Carbon Dioxide (CO2)      22 - 29 mmol/L 20 (L)    Anion Gap      7 - 15 mmol/L 19 (H)    Urea Nitrogen      8.0 - 23.0 mg/dL 18.3    Creatinine      0.67 - 1.17 mg/dL 0.92    GFR Estimate      >60 mL/min/1.73m2 >90    Calcium      8.8 - 10.2 mg/dL 9.5    Glucose      70 - 99 mg/dL 129 (H)      90 day earl refill sent to the pharmacy. Overdue labs per the refill protocol     Tammi Bowles RN  [facility] Red Flag Triage/MRT         Request from pharmacy:  Requested Prescriptions   Pending Prescriptions Disp Refills    furosemide (LASIX) 40 MG tablet 90 tablet 0     Sig: Take 1 tablet (40 mg) by mouth daily.       Diuretics (Including Combos) Protocol Failed - 3/21/2025  9:29 AM        Failed - Potassium level on file in past 12 months        Failed - Has GFR on file in past 12 months and most recent value is normal        Passed - Most recent blood pressure under 140/90 in past 12 months     BP Readings from Last 3 Encounters:   12/04/24 122/74   12/06/22 122/77   09/21/21 120/80       No data recorded            Passed - Medication is active on med list and the sig matches. RN to manually verify dose and sig if red X/fail.     If the protocol passes (green check), you do not need to verify med dose and sig.    A prescription matches if they are the same clinical intention.    For Example: once daily and every morning are the same.    The protocol can not identify upper and lower case letters as matching and  will fail.     For Example: Take 1 tablet (50 mg) by mouth daily     TAKE 1 TABLET (50 MG) BY MOUTH DAILY    For all fails (red x), verify dose and sig.    If the refill does match what is on file, the RN can still proceed to approve the refill request.       If they do not match, route to the appropriate provider.             Passed - Medication indicated for associated diagnosis     Medication is associated with one or more of the following diagnoses:     Edema   Hypertension   Hypercalcemia   Heart Failure   Chronic Kidney Disease (CKD)   Cardiomyopathy   Dyspnea   Chronic Thromboembolic Pulmonary Hypertension   Pulmonary Hypertension          Passed - Recent (12 mo) or future (90 days) visit within the authorizing provider's specialty     The patient must have completed an in-person or virtual visit within the past 12 months or has a future visit scheduled within the next 90 days with the authorizing provider s specialty.  Urgent care and e-visits do not qualify as an office visit for this protocol.          Passed - Patient is age 18 or older          metoprolol succinate ER (TOPROL XL) 50 MG 24 hr tablet 90 tablet 0     Sig: Take 1 tablet (50 mg) by mouth daily.       Beta-Blockers Protocol Passed - 3/21/2025  9:29 AM        Passed - Most recent blood pressure under 140/90 in past 12 months     BP Readings from Last 3 Encounters:   12/04/24 122/74   12/06/22 122/77   09/21/21 120/80       No data recorded            Passed - Patient is age 6 or older        Passed - Medication is active on med list and the sig matches. RN to manually verify dose and sig if red X/fail.     If the protocol passes (green check), you do not need to verify med dose and sig.    A prescription matches if they are the same clinical intention.    For Example: once daily and every morning are the same.    The protocol can not identify upper and lower case letters as matching and will fail.     For Example: Take 1 tablet (50 mg) by mouth  daily     TAKE 1 TABLET (50 MG) BY MOUTH DAILY    For all fails (red x), verify dose and sig.    If the refill does match what is on file, the RN can still proceed to approve the refill request.       If they do not match, route to the appropriate provider.             Passed - Medication indicated for associated diagnosis     Medication is associated with one or more of the following diagnoses:     Hypertension (HTN)   Atrial fibrillation/flutter   Angina   ASCVD   Migraine   Heart Failure   Tremor   Anxiety   Ocular hypertension   Glaucoma   PTSD   Obstructive hypertrophic cardiomyopathy   History of myocarditis   Palpitations   POTS (postural orthostatic tachycardia syndrome)   SVT (supraventricular tachycardia)   Hyperthyroidism   Tachycardia   Acute non-st segment elevation myocardial infarction   Subsequent non-st segment elevation myocardial infarction   Ischemic myocardial dysfunction          Passed - Recent (12 mo) or future (90 days) visit within the authorizing provider's specialty     The patient must have completed an in-person or virtual visit within the past 12 months or has a future visit scheduled within the next 90 days with the authorizing provider s specialty.  Urgent care and e-visits do not qualify as an office visit for this protocol.

## 2025-04-08 NOTE — TELEPHONE ENCOUNTER
Called and left a message for patient to schedule a non fasting lab appointment. Patient notified earl refills on his medications were sent.    VINOD Cook

## 2025-06-30 DIAGNOSIS — I10 ESSENTIAL HYPERTENSION: ICD-10-CM

## 2025-06-30 RX ORDER — SPIRONOLACTONE 25 MG/1
25 TABLET ORAL DAILY
Qty: 90 TABLET | Refills: 1 | Status: SHIPPED | OUTPATIENT
Start: 2025-06-30

## 2025-06-30 NOTE — TELEPHONE ENCOUNTER
Last Written Prescription:  spironolactone (ALDACTONE) 25 MG tablet 90 tablet 0 1/31/2025 -- No   Sig - Route: TAKE 1 TABLET(25 MG) BY MOUTH DAILY - Oral   Sent to pharmacy as: Spironolactone 25 MG Oral Tablet (ALDACTONE)   Class: E-Prescribe   Order: [id number]     ----------------------  Last Visit Date: 12/4/24  Future Visit Date: 0  ----------------------      Refill decision:   [x] Medication refilled per  Medication Refill in Ambulatory Care  policy.    Last Comprehensive Metabolic Panel:  Lab Results   Component Value Date     06/27/2025    POTASSIUM 4.3 06/27/2025    CHLORIDE 101 06/27/2025    CO2 23 06/27/2025    ANIONGAP 16 (H) 06/27/2025     (H) 06/27/2025    BUN 19.6 06/27/2025    CR 0.86 06/27/2025    GFRESTIMATED >90 06/27/2025    DENG 9.7 06/27/2025           Request from pharmacy:  Requested Prescriptions   Pending Prescriptions Disp Refills    spironolactone (ALDACTONE) 25 MG tablet [Pharmacy Med Name: SPIRONOLACTONE 25MG TABLETS] 90 tablet 0     Sig: TAKE 1 TABLET(25 MG) BY MOUTH DAILY       Diuretics (Including Combos) Protocol Passed - 6/30/2025  4:07 PM        Passed - Most recent blood pressure under 140/90 in past 12 months     BP Readings from Last 3 Encounters:   12/04/24 122/74   12/06/22 122/77   09/21/21 120/80       No data recorded            Passed - Potassium level on file in past 12 months        Passed - Medication is active on med list and the sig matches. RN to manually verify dose and sig if red X/fail.     If the protocol passes (green check), you do not need to verify med dose and sig.    A prescription matches if they are the same clinical intention.    For Example: once daily and every morning are the same.    The protocol can not identify upper and lower case letters as matching and will fail.     For Example: Take 1 tablet (50 mg) by mouth daily     TAKE 1 TABLET (50 MG) BY MOUTH DAILY    For all fails (red x), verify dose and sig.    If the refill does match  what is on file, the RN can still proceed to approve the refill request.       If they do not match, route to the appropriate provider.             Passed - Has GFR on file in past 12 months and most recent value is normal        Passed - Medication indicated for associated diagnosis     Medication is associated with one or more of the following diagnoses:     Hypertension   Heart Failure   Hyperaldosteronism   Acne   Hirsutism   Hypokalemia   Hepatic Cirrhosis   Nephrotic Syndrome   Myocardial Infarction   Transgender Female   Cardiomyopathy  Congenital Heart Disease  Diastolic Dysfunction          Passed - Recent (12 month) or future (90 days) visit with authorizing provider's specialty (provided they have been seen in the past 15 months)     The patient must have completed an in-person or virtual visit within the past 12 months or has a future visit scheduled within the next 90 days with the authorizing provider s specialty.  Urgent care and e-visits do not qualify as an office visit for this protocol.          Passed - Patient is age 18 or older

## 2025-07-02 ENCOUNTER — MYC REFILL (OUTPATIENT)
Dept: CARDIOLOGY | Facility: CLINIC | Age: 60
End: 2025-07-02
Payer: COMMERCIAL

## 2025-07-02 DIAGNOSIS — I25.10 CORONARY ARTERY DISEASE INVOLVING NATIVE HEART WITHOUT ANGINA PECTORIS, UNSPECIFIED VESSEL OR LESION TYPE: ICD-10-CM

## 2025-07-02 DIAGNOSIS — I10 ESSENTIAL HYPERTENSION: ICD-10-CM

## 2025-07-02 DIAGNOSIS — I21.4 NSTEMI (NON-ST ELEVATED MYOCARDIAL INFARCTION) (H): ICD-10-CM

## 2025-07-03 RX ORDER — LISINOPRIL 10 MG/1
10 TABLET ORAL DAILY
Qty: 90 TABLET | Refills: 0 | Status: SHIPPED | OUTPATIENT
Start: 2025-07-03

## 2025-07-03 NOTE — TELEPHONE ENCOUNTER
Last Written Prescription:  lisinopril (ZESTRIL) 10 MG tablet 90 tablet 1 7/26/2024 -- No   Sig - Route: Take 1 tablet (10 mg) by mouth daily - Oral   Sent to pharmacy as: Lisinopril 10 MG Oral Tablet (ZESTRIL)   Class: E-Prescribe   Order: [id number]     ----------------------  Last Visit Date: 12/4/24  Future Visit Date: 0  ----------------------      Refill decision:   [x] Medication refilled per  Medication Refill in Ambulatory Care  policy.    Last Comprehensive Metabolic Panel:  Lab Results   Component Value Date     06/27/2025    POTASSIUM 4.3 06/27/2025    CHLORIDE 101 06/27/2025    CO2 23 06/27/2025    ANIONGAP 16 (H) 06/27/2025     (H) 06/27/2025    BUN 19.6 06/27/2025    CR 0.86 06/27/2025    GFRESTIMATED >90 06/27/2025    DENG 9.7 06/27/2025       BP Readings from Last 3 Encounters:   12/04/24 122/74   12/06/22 122/77   09/21/21 120/80       Warnings Override History for lisinopril (ZESTRIL) 10 MG tablet [[id number]]    Overridden by Jenni Lezama RN on Jun 30, 2025 4:10 PM   Drug-Drug   1. POTASSIUM-SPARING DIURETICS / ANGIOTENSIN-CONVERTING ENZYME INHIBITORS [Level: Major] [Reason: Tolerated medication/side effects in past]   Other Orders: spironolactone (ALDACTONE) 25 MG tablet          Request from pharmacy:  Requested Prescriptions   Pending Prescriptions Disp Refills    lisinopril (ZESTRIL) 10 MG tablet 90 tablet 1     Sig: Take 1 tablet (10 mg) by mouth daily.       ACE Inhibitors (Including Combos) Protocol Passed - 7/3/2025  1:04 PM        Passed - Most recent blood pressure under 140/90 in past 12 months- Clinicial or Patient Reported     BP Readings from Last 3 Encounters:   12/04/24 122/74   12/06/22 122/77   09/21/21 120/80       No data recorded            Passed - Medication is active on med list and the sig matches. RN to manually verify dose and sig if red X/fail.     If the protocol passes (green check), you do not need to verify med dose and sig.    A prescription  matches if they are the same clinical intention.    For Example: once daily and every morning are the same.    The protocol can not identify upper and lower case letters as matching and will fail.     For Example: Take 1 tablet (50 mg) by mouth daily     TAKE 1 TABLET (50 MG) BY MOUTH DAILY    For all fails (red x), verify dose and sig.    If the refill does match what is on file, the RN can still proceed to approve the refill request.       If they do not match, route to the appropriate provider.             Passed - Medication indicated for associated diagnosis     Medication is associated with one or more of the following diagnoses:     Chronic Kidney Disease (CKD)   Coronary Artery Disease (CAD)   Diabetes   Heart Failure (HF)   Hypertension (HTN)   Nephropathy   History of myocarditis   Tachycardia induced cardiomyopathy   STEMI (ST elevation myocardial infarction)   Spontaneous dissection of coronary artery   Status post percutaneous transluminal coronary angioplasty   Cardiomyopathy            Passed - Recent (12 month) or future (90 days) visit with authorizing provider's specialty (provided they have been seen in the past 15 months)     The patient must have completed an in-person or virtual visit within the past 12 months or has a future visit scheduled within the next 90 days with the authorizing provider s specialty.  Urgent care and e-visits do not qualify as an office visit for this protocol.          Passed - Most recent GFR on file in the past 12 months >30        Passed - Patient is age 18 or older        Passed - Normal serum potassium on file in past 12 months     Recent Labs   Lab Test 06/27/25  1447   POTASSIUM 4.3

## 2025-07-13 ENCOUNTER — HEALTH MAINTENANCE LETTER (OUTPATIENT)
Age: 60
End: 2025-07-13

## (undated) DEVICE — DEFIB PRO-PADZ LVP LQD GEL ADULT 8900-2105-01

## (undated) DEVICE — VALVE HEMOSTASIS .096" COPILOT MECH 1003331

## (undated) DEVICE — CATH LAUNCHER 6FR LA6EBU375

## (undated) DEVICE — GUIDEWIRE VASC 0.014INX180CM RUNTHROUGH 25-1011

## (undated) DEVICE — KIT HAND CONTROL ANGIOTOUCH ACIST 65CM AT-P65

## (undated) DEVICE — CATH ANGIO JUDKINS R4 6FRX100CM INFINITI 534621T

## (undated) DEVICE — CATH LAUNCHER 6FR EBU 4.0 LA6EBU40

## (undated) DEVICE — INFL DVC KIT W/10CC NITRO IN4530

## (undated) DEVICE — CATH LAUNCHER 6FR EBU 3.5 LA6EBU35

## (undated) DEVICE — CATH BALLOON NC EMERGE 3.75X12MM H7493926712370

## (undated) DEVICE — SLEEVE TR BAND RADIAL COMPRESSION DEVICE 29CM XX-RF06L

## (undated) DEVICE — INTRO GLIDESHEATH SLENDER 6FR 10X45CM 60-1060

## (undated) DEVICE — WIRE GUIDE 0.035"X260CM SAFE-T-J EXCHANGE G00517

## (undated) DEVICE — TOTE ANGIO CORP PC15AT SAN32CC83O

## (undated) DEVICE — MANIFOLD KIT ANGIO AUTOMATED 014613

## (undated) DEVICE — CATH BALLOON EMERGE 2.75X12MMH7493918912270

## (undated) RX ORDER — NITROGLYCERIN 5 MG/ML
VIAL (ML) INTRAVENOUS
Status: DISPENSED
Start: 2019-05-01

## (undated) RX ORDER — HEPARIN SODIUM 200 [USP'U]/100ML
INJECTION, SOLUTION INTRAVENOUS
Status: DISPENSED
Start: 2019-04-30

## (undated) RX ORDER — HEPARIN SODIUM 1000 [USP'U]/ML
INJECTION, SOLUTION INTRAVENOUS; SUBCUTANEOUS
Status: DISPENSED
Start: 2019-04-30

## (undated) RX ORDER — LIDOCAINE HYDROCHLORIDE 10 MG/ML
INJECTION, SOLUTION EPIDURAL; INFILTRATION; INTRACAUDAL; PERINEURAL
Status: DISPENSED
Start: 2019-05-01

## (undated) RX ORDER — FENTANYL CITRATE 50 UG/ML
INJECTION, SOLUTION INTRAMUSCULAR; INTRAVENOUS
Status: DISPENSED
Start: 2019-04-30

## (undated) RX ORDER — VERAPAMIL HYDROCHLORIDE 2.5 MG/ML
INJECTION, SOLUTION INTRAVENOUS
Status: DISPENSED
Start: 2019-05-01

## (undated) RX ORDER — VERAPAMIL HYDROCHLORIDE 2.5 MG/ML
INJECTION, SOLUTION INTRAVENOUS
Status: DISPENSED
Start: 2019-04-30

## (undated) RX ORDER — HEPARIN SODIUM 1000 [USP'U]/ML
INJECTION, SOLUTION INTRAVENOUS; SUBCUTANEOUS
Status: DISPENSED
Start: 2019-05-01

## (undated) RX ORDER — LIDOCAINE HYDROCHLORIDE 10 MG/ML
INJECTION, SOLUTION EPIDURAL; INFILTRATION; INTRACAUDAL; PERINEURAL
Status: DISPENSED
Start: 2019-04-30